# Patient Record
Sex: MALE | Race: WHITE | NOT HISPANIC OR LATINO | Employment: FULL TIME | URBAN - METROPOLITAN AREA
[De-identification: names, ages, dates, MRNs, and addresses within clinical notes are randomized per-mention and may not be internally consistent; named-entity substitution may affect disease eponyms.]

---

## 2020-05-26 ENCOUNTER — HOSPITAL ENCOUNTER (INPATIENT)
Facility: HOSPITAL | Age: 50
LOS: 3 days | Discharge: HOME | DRG: 433 | End: 2020-05-29
Attending: EMERGENCY MEDICINE | Admitting: INTERNAL MEDICINE
Payer: COMMERCIAL

## 2020-05-26 ENCOUNTER — APPOINTMENT (EMERGENCY)
Dept: CT IMAGING | Facility: HOSPITAL | Age: 50
DRG: 433 | End: 2020-05-26
Payer: COMMERCIAL

## 2020-05-26 ENCOUNTER — APPOINTMENT (EMERGENCY)
Dept: RADIOLOGY | Facility: HOSPITAL | Age: 50
DRG: 433 | End: 2020-05-26
Payer: COMMERCIAL

## 2020-05-26 ENCOUNTER — APPOINTMENT (OUTPATIENT)
Dept: INTERVENTIONAL RADIOLOGY/VASCULAR | Facility: HOSPITAL | Age: 50
DRG: 433 | End: 2020-05-26
Payer: COMMERCIAL

## 2020-05-26 DIAGNOSIS — R18.8 CIRRHOSIS OF LIVER WITH ASCITES, UNSPECIFIED HEPATIC CIRRHOSIS TYPE (HCC): ICD-10-CM

## 2020-05-26 DIAGNOSIS — U07.1 COVID-19: ICD-10-CM

## 2020-05-26 DIAGNOSIS — K74.60 CIRRHOSIS (HCC): ICD-10-CM

## 2020-05-26 DIAGNOSIS — R16.1 SPLENOMEGALY: ICD-10-CM

## 2020-05-26 DIAGNOSIS — R18.8 ASCITES: Primary | ICD-10-CM

## 2020-05-26 DIAGNOSIS — K74.60 CIRRHOSIS OF LIVER WITH ASCITES, UNSPECIFIED HEPATIC CIRRHOSIS TYPE (HCC): ICD-10-CM

## 2020-05-26 DIAGNOSIS — R60.0 LOCALIZED EDEMA: ICD-10-CM

## 2020-05-26 DIAGNOSIS — I49.8 BIGEMINY: ICD-10-CM

## 2020-05-26 PROBLEM — F11.20 METHADONE MAINTENANCE THERAPY PATIENT (HCC): Chronic | Status: ACTIVE | Noted: 2020-05-26

## 2020-05-26 PROBLEM — D61.818 PANCYTOPENIA (HCC): Status: ACTIVE | Noted: 2020-05-26

## 2020-05-26 LAB
ALBUMIN FLD-MCNC: <0.6 G/DL
ALBUMIN SERPL BCP-MCNC: 2.6 G/DL (ref 3.5–5)
ALP SERPL-CCNC: 64 U/L (ref 46–116)
ALT SERPL W P-5'-P-CCNC: 30 U/L (ref 12–78)
ANION GAP SERPL CALCULATED.3IONS-SCNC: 4 MMOL/L (ref 4–13)
APPEARANCE FLD: CLEAR
APTT PPP: 38 SECONDS (ref 23–37)
AST SERPL W P-5'-P-CCNC: 46 U/L (ref 5–45)
ATRIAL RATE: 95 BPM
BACTERIA UR QL AUTO: ABNORMAL /HPF
BASOPHILS # BLD AUTO: 0.02 THOUSANDS/ΜL (ref 0–0.1)
BASOPHILS NFR BLD AUTO: 1 % (ref 0–1)
BILIRUB SERPL-MCNC: 2 MG/DL (ref 0.2–1)
BILIRUB UR QL STRIP: NEGATIVE
BUN SERPL-MCNC: 16 MG/DL (ref 5–25)
CALCIUM SERPL-MCNC: 8 MG/DL (ref 8.3–10.1)
CHLORIDE SERPL-SCNC: 103 MMOL/L (ref 100–108)
CLARITY UR: CLEAR
CO2 SERPL-SCNC: 31 MMOL/L (ref 21–32)
COLOR FLD: NORMAL
COLOR UR: ABNORMAL
CREAT SERPL-MCNC: 0.86 MG/DL (ref 0.6–1.3)
EOSINOPHIL # BLD AUTO: 0.12 THOUSAND/ΜL (ref 0–0.61)
EOSINOPHIL NFR BLD AUTO: 3 % (ref 0–6)
ERYTHROCYTE [DISTWIDTH] IN BLOOD BY AUTOMATED COUNT: 16.2 % (ref 11.6–15.1)
GFR SERPL CREATININE-BSD FRML MDRD: 103 ML/MIN/1.73SQ M
GLUCOSE SERPL-MCNC: 141 MG/DL (ref 65–140)
GLUCOSE UR STRIP-MCNC: NEGATIVE MG/DL
HCT VFR BLD AUTO: 36.8 % (ref 36.5–49.3)
HGB BLD-MCNC: 11.8 G/DL (ref 12–17)
HGB UR QL STRIP.AUTO: NEGATIVE
HISTIOCYTES NFR FLD: 12 %
IMM GRANULOCYTES # BLD AUTO: 0.01 THOUSAND/UL (ref 0–0.2)
IMM GRANULOCYTES NFR BLD AUTO: 0 % (ref 0–2)
INR PPP: 1.53 (ref 0.84–1.19)
KETONES UR STRIP-MCNC: NEGATIVE MG/DL
LDH FLD L TO P-CCNC: 52 U/L
LEUKOCYTE ESTERASE UR QL STRIP: NEGATIVE
LIPASE SERPL-CCNC: 188 U/L (ref 73–393)
LYMPHOCYTES # BLD AUTO: 0.87 THOUSANDS/ΜL (ref 0.6–4.47)
LYMPHOCYTES NFR BLD AUTO: 24 % (ref 14–44)
LYMPHOCYTES NFR BLD AUTO: 57 %
MCH RBC QN AUTO: 29.8 PG (ref 26.8–34.3)
MCHC RBC AUTO-ENTMCNC: 32.1 G/DL (ref 31.4–37.4)
MCV RBC AUTO: 93 FL (ref 82–98)
MONO+MESO NFR FLD MANUAL: 3 %
MONOCYTES # BLD AUTO: 0.43 THOUSAND/ΜL (ref 0.17–1.22)
MONOCYTES NFR BLD AUTO: 12 % (ref 4–12)
MONOCYTES NFR BLD AUTO: 22 %
NEUTROPHILS # BLD AUTO: 2.17 THOUSANDS/ΜL (ref 1.85–7.62)
NEUTS SEG NFR BLD AUTO: 6 %
NEUTS SEG NFR BLD AUTO: 60 % (ref 43–75)
NITRITE UR QL STRIP: NEGATIVE
NON-SQ EPI CELLS URNS QL MICRO: ABNORMAL /HPF
NRBC BLD AUTO-RTO: 0 /100 WBCS
PH UR STRIP.AUTO: 7.5 [PH]
PLATELET # BLD AUTO: 56 THOUSANDS/UL (ref 149–390)
PMV BLD AUTO: 10.7 FL (ref 8.9–12.7)
POTASSIUM SERPL-SCNC: 3.6 MMOL/L (ref 3.5–5.3)
PR INTERVAL: 96 MS
PROT FLD-MCNC: <2 G/DL
PROT SERPL-MCNC: 7.1 G/DL (ref 6.4–8.2)
PROT UR STRIP-MCNC: NEGATIVE MG/DL
PROTHROMBIN TIME: 18.1 SECONDS (ref 11.6–14.5)
QRS AXIS: -18 DEGREES
QRSD INTERVAL: 92 MS
QT INTERVAL: 354 MS
QTC INTERVAL: 444 MS
RBC # BLD AUTO: 3.96 MILLION/UL (ref 3.88–5.62)
RBC #/AREA URNS AUTO: ABNORMAL /HPF
SARS-COV-2 RNA RESP QL NAA+PROBE: POSITIVE
SITE: NORMAL
SODIUM SERPL-SCNC: 138 MMOL/L (ref 136–145)
SP GR UR STRIP.AUTO: 1.01 (ref 1–1.03)
T WAVE AXIS: -9 DEGREES
TOTAL CELLS COUNTED SPEC: 100
TROPONIN I SERPL-MCNC: <0.02 NG/ML
TSH SERPL DL<=0.05 MIU/L-ACNC: 4.81 UIU/ML (ref 0.36–3.74)
UROBILINOGEN UR QL STRIP.AUTO: 2 E.U./DL
VENTRICULAR RATE: 95 BPM
WBC # BLD AUTO: 3.62 THOUSAND/UL (ref 4.31–10.16)
WBC # FLD MANUAL: 113 /UL
WBC #/AREA URNS AUTO: ABNORMAL /HPF

## 2020-05-26 PROCEDURE — 80053 COMPREHEN METABOLIC PANEL: CPT | Performed by: PHYSICIAN ASSISTANT

## 2020-05-26 PROCEDURE — 99285 EMERGENCY DEPT VISIT HI MDM: CPT | Performed by: PHYSICIAN ASSISTANT

## 2020-05-26 PROCEDURE — 87070 CULTURE OTHR SPECIMN AEROBIC: CPT | Performed by: EMERGENCY MEDICINE

## 2020-05-26 PROCEDURE — 99223 1ST HOSP IP/OBS HIGH 75: CPT | Performed by: INTERNAL MEDICINE

## 2020-05-26 PROCEDURE — 74177 CT ABD & PELVIS W/CONTRAST: CPT

## 2020-05-26 PROCEDURE — 82042 OTHER SOURCE ALBUMIN QUAN EA: CPT | Performed by: EMERGENCY MEDICINE

## 2020-05-26 PROCEDURE — 83690 ASSAY OF LIPASE: CPT | Performed by: PHYSICIAN ASSISTANT

## 2020-05-26 PROCEDURE — 89051 BODY FLUID CELL COUNT: CPT | Performed by: EMERGENCY MEDICINE

## 2020-05-26 PROCEDURE — 83615 LACTATE (LD) (LDH) ENZYME: CPT | Performed by: EMERGENCY MEDICINE

## 2020-05-26 PROCEDURE — 85730 THROMBOPLASTIN TIME PARTIAL: CPT | Performed by: PHYSICIAN ASSISTANT

## 2020-05-26 PROCEDURE — 84443 ASSAY THYROID STIM HORMONE: CPT | Performed by: PHYSICIAN ASSISTANT

## 2020-05-26 PROCEDURE — 87205 SMEAR GRAM STAIN: CPT | Performed by: EMERGENCY MEDICINE

## 2020-05-26 PROCEDURE — 80074 ACUTE HEPATITIS PANEL: CPT | Performed by: PHYSICIAN ASSISTANT

## 2020-05-26 PROCEDURE — 99285 EMERGENCY DEPT VISIT HI MDM: CPT

## 2020-05-26 PROCEDURE — 93005 ELECTROCARDIOGRAM TRACING: CPT

## 2020-05-26 PROCEDURE — 87635 SARS-COV-2 COVID-19 AMP PRB: CPT | Performed by: PHYSICIAN ASSISTANT

## 2020-05-26 PROCEDURE — 93010 ELECTROCARDIOGRAM REPORT: CPT | Performed by: INTERNAL MEDICINE

## 2020-05-26 PROCEDURE — 71045 X-RAY EXAM CHEST 1 VIEW: CPT

## 2020-05-26 PROCEDURE — 84484 ASSAY OF TROPONIN QUANT: CPT | Performed by: PHYSICIAN ASSISTANT

## 2020-05-26 PROCEDURE — 85025 COMPLETE CBC W/AUTO DIFF WBC: CPT | Performed by: PHYSICIAN ASSISTANT

## 2020-05-26 PROCEDURE — 87040 BLOOD CULTURE FOR BACTERIA: CPT | Performed by: PHYSICIAN ASSISTANT

## 2020-05-26 PROCEDURE — 82570 ASSAY OF URINE CREATININE: CPT | Performed by: INTERNAL MEDICINE

## 2020-05-26 PROCEDURE — 85610 PROTHROMBIN TIME: CPT | Performed by: PHYSICIAN ASSISTANT

## 2020-05-26 PROCEDURE — 36415 COLL VENOUS BLD VENIPUNCTURE: CPT | Performed by: PHYSICIAN ASSISTANT

## 2020-05-26 PROCEDURE — 49083 ABD PARACENTESIS W/IMAGING: CPT

## 2020-05-26 PROCEDURE — 84156 ASSAY OF PROTEIN URINE: CPT | Performed by: INTERNAL MEDICINE

## 2020-05-26 PROCEDURE — 81001 URINALYSIS AUTO W/SCOPE: CPT | Performed by: INTERNAL MEDICINE

## 2020-05-26 PROCEDURE — 84157 ASSAY OF PROTEIN OTHER: CPT | Performed by: EMERGENCY MEDICINE

## 2020-05-26 RX ORDER — FUROSEMIDE 40 MG/1
40 TABLET ORAL DAILY
Status: DISCONTINUED | OUTPATIENT
Start: 2020-05-27 | End: 2020-05-28

## 2020-05-26 RX ORDER — SPIRONOLACTONE 25 MG/1
100 TABLET ORAL DAILY
Status: DISCONTINUED | OUTPATIENT
Start: 2020-05-27 | End: 2020-05-29 | Stop reason: HOSPADM

## 2020-05-26 RX ORDER — METHADONE HYDROCHLORIDE 10 MG/1
100 TABLET ORAL DAILY
Status: DISCONTINUED | OUTPATIENT
Start: 2020-05-27 | End: 2020-05-29 | Stop reason: HOSPADM

## 2020-05-26 RX ORDER — ACETAMINOPHEN 325 MG/1
650 TABLET ORAL EVERY 8 HOURS PRN
Status: DISCONTINUED | OUTPATIENT
Start: 2020-05-26 | End: 2020-05-29 | Stop reason: HOSPADM

## 2020-05-26 RX ADMIN — IOHEXOL 100 ML: 350 INJECTION, SOLUTION INTRAVENOUS at 14:37

## 2020-05-26 RX ADMIN — ACETAMINOPHEN 650 MG: 325 TABLET ORAL at 20:20

## 2020-05-26 NOTE — ED PROVIDER NOTES
History  Chief Complaint   Patient presents with   [place]     pt reports bloating that he noticed about a month ago  states that it is now in his legs and going down into his scrotum  states he "never goes to the doctor"      Patient is a 53 y/o M with h/o IV drug abuse, currently on methadone presents to the ED with abdominal bloating, swelling to groin and legs for 2 months that has been worsening  Patient states he never goes to a doctor  He denies history of hepatitis  He denies abdominal pain  He states his father was diagnosed with covid about 1 month ago and he had similar symptoms so he was told he most likely has it  He has SOB, no cough  No chest pain  History provided by:  Patient  Medical Problem   Location:  Abdominal bloating, scrotal and leg edema  Severity:  Moderate  Onset quality:  Gradual  Duration:  2 months  Timing:  Constant  Progression:  Worsening  Chronicity:  New  Context:  Patient presents to the ED with abdominal bloating and swelling of scrotum and legs for 2 months  PMH unremarkable  Relieved by:  Nothing  Worsened by:  Nothing  Ineffective treatments:  None tried  Associated symptoms: shortness of breath    Associated symptoms: no abdominal pain, no chest pain, no congestion, no cough, no diarrhea, no fatigue, no fever, no headaches, no loss of consciousness, no nausea, no rash, no rhinorrhea and no vomiting        None       History reviewed  No pertinent past medical history  Past Surgical History:   Procedure Laterality Date    IR PARACENTESIS  5/26/2020       History reviewed  No pertinent family history  I have reviewed and agree with the history as documented      E-Cigarette/Vaping     E-Cigarette/Vaping Substances     Social History     Tobacco Use    Smoking status: Never Smoker    Smokeless tobacco: Never Used   Substance Use Topics    Alcohol use: Not Currently    Drug use: Not Currently     Comment: methadone for 20 years        Review of Systems Constitutional: Negative for activity change, appetite change, chills, fatigue and fever  HENT: Negative for congestion and rhinorrhea  Respiratory: Positive for shortness of breath  Negative for cough  Cardiovascular: Positive for leg swelling  Negative for chest pain  Gastrointestinal: Negative for abdominal pain, diarrhea, nausea and vomiting  Musculoskeletal: Negative for back pain  Skin: Negative for color change and rash  Neurological: Negative for dizziness, loss of consciousness, weakness and headaches  Psychiatric/Behavioral: Negative for confusion  All other systems reviewed and are negative  Physical Exam  Physical Exam   Constitutional: He is oriented to person, place, and time  He appears well-developed and well-nourished  He is cooperative  He does not appear ill  No distress  HENT:   Head: Normocephalic and atraumatic  Eyes: Conjunctivae are normal    Neck: Normal range of motion  Cardiovascular: An irregular rhythm present  Bradycardia present  3+ pitting edema b/l LE  Pulmonary/Chest: Effort normal and breath sounds normal    Abdominal: Bowel sounds are normal  He exhibits ascites  There is no tenderness  Obese abdomen   Musculoskeletal: He exhibits edema  Neurological: He is alert and oriented to person, place, and time  He has normal strength  Skin: Skin is warm and dry  No rash noted  He is not diaphoretic  No pallor  Nursing note and vitals reviewed        Vital Signs  ED Triage Vitals   Temperature Pulse Respirations Blood Pressure SpO2   05/26/20 1239 05/26/20 1209 05/26/20 1209 05/26/20 1209 05/26/20 1209   98 8 °F (37 1 °C) (!) 53 18 109/81 99 %      Temp Source Heart Rate Source Patient Position - Orthostatic VS BP Location FiO2 (%)   05/26/20 1239 05/26/20 1209 05/26/20 1209 05/26/20 1209 --   Oral Monitor Lying Right arm       Pain Score       --                  Vitals:    05/26/20 1209 05/26/20 1400 05/26/20 1645 05/26/20 1700   BP: 109/81 137/90 154/70 151/71   Pulse: (!) 53 80 84 99   Patient Position - Orthostatic VS: Lying Lying           Visual Acuity      ED Medications  Medications   iohexol (OMNIPAQUE) 350 MG/ML injection (MULTI-DOSE) 100 mL (100 mL Intravenous Given 5/26/20 1437)       Diagnostic Studies  Results Reviewed     Procedure Component Value Units Date/Time    Albumin, fluid [[id number]] Collected:  05/26/20 1711    Lab Status: In process Specimen: Body Fluid from Paracentesis Updated:  05/26/20 1729    Total Protein, Fluid [[id number]] Collected:  05/26/20 1711    Lab Status: In process Specimen: Body Fluid from Paracentesis Updated:  05/26/20 1729    LD (LDH), Body Fluid [[id number]] Collected:  05/26/20 1711    Lab Status: In process Specimen: Body Fluid from Paracentesis Updated:  05/26/20 1729    Body fluid culture and Gram stain [[id number]] Collected:  05/26/20 1711    Lab Status: In process Specimen: Body Fluid from Paracentesis Updated:  05/26/20 1729    Body fluid white cell count with differential [[id number]] Collected:  05/26/20 1711    Lab Status: In process Specimen: Body Fluid from Paracentesis Updated:  05/26/20 1728    Hepatitis panel, acute [[id number]] Collected:  05/26/20 1539    Lab Status: In process Specimen:  Blood from Arm, Left Updated:  05/26/20 1542    Novel Coronavirus (Covid-19),PCR [facility] [[id number]]  (Abnormal) Collected:  05/26/20 1248    Lab Status:  Final result Specimen:  Nares from Nose Updated:  05/26/20 1350     SARS-CoV-2 Positive    Narrative: The specimen collection materials, transport medium, and/or testing methodology utilized in the production of these test results have been proven to be reliable in a limited validation with an abbreviated program under the Emergency Utilization Authorization provided by the FDA  Testing reported as "Presumptive positive" will be confirmed with secondary testing with a reference laboratory to ensure result accuracy    Clinical caution and judgement should be used with the interpretation of these results with consideration of the clinical impression and other laboratory testing  Testing reported as "Positive" or "Negative" has been proven to be accurate according to standard laboratory validation requirements  All testing is performed with control materials showing appropriate reactivity at standard intervals  Protime-INR [[id number]]  (Abnormal) Collected:  05/26/20 1222    Lab Status:  Final result Specimen:  Blood from Arm, Left Updated:  05/26/20 1309     Protime 18 1 seconds      INR 1 53    APTT [[id number]]  (Abnormal) Collected:  05/26/20 1222    Lab Status:  Final result Specimen:  Blood from Arm, Left Updated:  05/26/20 1309     PTT 38 seconds     CBC and differential [[id number]]  (Abnormal) Collected:  05/26/20 1222    Lab Status:  Final result Specimen:  Blood from Arm, Left Updated:  05/26/20 1305     WBC 3 62 Thousand/uL      RBC 3 96 Million/uL      Hemoglobin 11 8 g/dL      Hematocrit 36 8 %      MCV 93 fL      MCH 29 8 pg      MCHC 32 1 g/dL      RDW 16 2 %      MPV 10 7 fL      Platelets 56 Thousands/uL      nRBC 0 /100 WBCs      Neutrophils Relative 60 %      Immat GRANS % 0 %      Lymphocytes Relative 24 %      Monocytes Relative 12 %      Eosinophils Relative 3 %      Basophils Relative 1 %      Neutrophils Absolute 2 17 Thousands/µL      Immature Grans Absolute 0 01 Thousand/uL      Lymphocytes Absolute 0 87 Thousands/µL      Monocytes Absolute 0 43 Thousand/µL      Eosinophils Absolute 0 12 Thousand/µL      Basophils Absolute 0 02 Thousands/µL     TSH [[id number]]  (Abnormal) Collected:  05/26/20 1222    Lab Status:  Final result Specimen:  Blood from Arm, Left Updated:  05/26/20 1301     TSH 3RD GENERATON 4 812 uIU/mL     Narrative:       Patients undergoing fluorescein dye angiography may retain small amounts of fluorescein in the body for 48-72 hours post procedure   Samples containing fluorescein can produce falsely depressed TSH values  If the patient had this procedure,a specimen should be resubmitted post fluorescein clearance  Comprehensive metabolic panel [[id number]]  (Abnormal) Collected:  05/26/20 1222    Lab Status:  Final result Specimen:  Blood from Arm, Left Updated:  05/26/20 1257     Sodium 138 mmol/L      Potassium 3 6 mmol/L      Chloride 103 mmol/L      CO2 31 mmol/L      ANION GAP 4 mmol/L      BUN 16 mg/dL      Creatinine 0 86 mg/dL      Glucose 141 mg/dL      Calcium 8 0 mg/dL      AST 46 U/L      ALT 30 U/L      Alkaline Phosphatase 64 U/L      Total Protein 7 1 g/dL      Albumin 2 6 g/dL      Total Bilirubin 2 00 mg/dL      eGFR 103 ml/min/1 73sq m     Narrative:       [place] guidelines for Chronic Kidney Disease (CKD):     Stage 1 with normal or high GFR (GFR > 90 mL/min/1 73 square meters)    Stage 2 Mild CKD (GFR = 60-89 mL/min/1 73 square meters)    Stage 3A Moderate CKD (GFR = 45-59 mL/min/1 73 square meters)    Stage 3B Moderate CKD (GFR = 30-44 mL/min/1 73 square meters)    Stage 4 Severe CKD (GFR = 15-29 mL/min/1 73 square meters)    Stage 5 End Stage CKD (GFR <15 mL/min/1 73 square meters)  Note: GFR calculation is accurate only with a steady state creatinine    Lipase [[id number]]  (Normal) Collected:  05/26/20 1222    Lab Status:  Final result Specimen:  Blood from Arm, Left Updated:  05/26/20 1257     Lipase 188 u/L     Troponin I [[id number]]  (Normal) Collected:  05/26/20 1222    Lab Status:  Final result Specimen:  Blood from Arm, Left Updated:  05/26/20 1256     Troponin I <0 02 ng/mL     Blood culture #1 [[id number]] Collected:  05/26/20 1222    Lab Status: In process Specimen:  Blood from Arm, Left Updated:  05/26/20 1235    Blood culture #2 [[id number]] Collected:  05/26/20 1223    Lab Status:   In process Specimen:  Blood from Arm, Right Updated:  05/26/20 1233    POCT urinalysis dipstick [[id number]]     Lab Status:  No result Specimen:  Urine IR paracentesis   Final Result by Cathy Tripathi MD (05/26 4990)   Technically successful image-guided paracentesis as described  Workstation performed: [id number]         CT abdomen pelvis with contrast   Final Result by Franki Swan MD (05/26 6347)      Cirrhosis with evidence of portal hypertension and massive splenomegaly  Large volume of abdominopelvic ascites  Fluid extends into both inguinal canals and into the scrotum  Diffuse body wall anasarca  Trace left pleural effusion  Workstation performed: [id number]         XR chest 1 view portable   Final Result by Arielle Dimas MD (05/26 5623)      No acute cardiopulmonary disease  Workstation performed: [id number]                    Procedures  ECG 12 Lead Documentation Only  Date/Time: 5/26/2020 12:42 PM  Performed by: Maxime Bertrand PA-C  Authorized by: Maxime Bertrand PA-C     Indications / Diagnosis:  SOB  ECG reviewed by me, the ED Provider: yes    Patient location:  ED  Previous ECG:     Previous ECG:  Unavailable  Rate:     ECG rate:  95  Rhythm:     Rhythm: sinus rhythm    Ectopy:     Ectopy: bigeminy    ST segments:     ST segments:  Normal  Comments:      Low voltage  QTc 444             ED Course  ED Course as of May 26 1749   Tue May 26, 2020   1515 SLIM paged for admission to hospital        1529 Dr Lucina Mike would like IR contacted  Paged IR                                            MDM  Number of Diagnoses or Management Options  Ascites: new and requires workup  Bigeminy: new and requires workup  Cirrhosis ([facility] [address] ): new and requires workup  COVID-19: new and requires workup  Splenomegaly: new and requires workup  Diagnosis management comments: Patient with new onset ascites, will order labs, CT scan to r/o cirrhosis, hypothyroid  Patient with large amount of ascites, IR in the ER to perform paracentesis  Patient covid positive, on isolation precautions    WIll admit for observation after paracentesis  Amount and/or Complexity of Data Reviewed  Clinical lab tests: ordered and reviewed  Tests in the radiology section of CPT®: ordered and reviewed  Discuss the patient with other providers: yes    Patient Progress  Patient progress: stable        Disposition  Final diagnoses:   Ascites   COVID-19   Splenomegaly   Bigeminy   Cirrhosis ([facility] [place] [address] )     Time reflects when diagnosis was documented in both MDM as applicable and the Disposition within this note     Time User Action Codes Description Comment    5/26/2020  3:15 PM Tamra Grate [R18 8] Ascites     5/26/2020  3:15 PM Lance Chance Add [U07 1] COVID-19     5/26/2020  3:16 PM Lance Chance Add [R16 1] Splenomegaly     5/26/2020  3:16 PM Lance [place] Add [I49 9] Bigeminy     5/26/2020  3:16 PM Lance Chance Add [K74 60] Cirrhosis [facility])       ED Disposition     ED Disposition Condition Date/Time Comment    Admit Stable Tue May 26, 2020  4:01 PM Case was discussed with Dr Barbara Rosen and the patient's admission status was agreed to be Admission Status: observation status to the service of Dr Barbara Rosen   Follow-up Information    None         There are no discharge medications for this patient  No discharge procedures on file      PDMP Review     None          ED Provider  Electronically Signed by           Kwadwo Carpenter PA-C  05/26/20 6180

## 2020-05-26 NOTE — ASSESSMENT & PLAN NOTE
Patient denies previous history disease reports markedly worsening edema the last 1 to 2 weeks  Patient thinks he had COVID about a month ago but his COVID-19 infection had resolved  Patient denies any active alcohol use, has not had any significant alcohol intake in more than 20 years  Does have history of opiate abuse has been on methadone for many years    Patient has no chronic pain, denies any acute pain  Check hepatitis panel, GI consult requested, hemochromatosis testing ordered  Patient may need liver biopsy, follow-up ascitic fluid testing    Check echocardiogram    Check lower extremity Dopplers, liver ultrasound with Dopplers ordered

## 2020-05-26 NOTE — H&P
H&P- Juanito Banda 1970, 54 y o  male MRN: [id number]    Unit/Bed#: -01 Encounter: [id number]    Primary Care Provider: No primary care provider on file  Date and time admitted to hospital: 5/26/2020 12:08 PM        * Cirrhosis of liver with ascites [facility])  Assessment & Plan  Patient denies previous history disease reports markedly worsening edema the last 1 to 2 weeks  Patient thinks he had COVID about a month ago but his COVID-19 infection had resolved  Patient denies any active alcohol use, has not had any significant alcohol intake in more than 20 years  Start diuretics Lasix and Aldactone, fluid restricted diet and low-sodium diet    Patient has no chronic pain, denies any acute pain  Check hepatitis panel, GI consult requested, hemochromatosis testing ordered  Patient may need liver biopsy, follow-up ascitic fluid testing    Check echocardiogram    Check lower extremity Dopplers, liver ultrasound with Dopplers ordered  COVID-19  Assessment & Plan  I do not think that the patient has an active COVID-19 infection, likely has residual positive testing from recent infection  Maintain isolation, will review with Infectious Disease  Methadone maintenance therapy patient [facility])  Assessment & Plan  Continue methadone        VTE Prophylaxis: Enoxaparin (Lovenox)  / reason for no mechanical VTE prophylaxis Not indicated   Code Status:  Full code  POLST: There is no POLST form on file for this patient (pre-hospital)      Anticipated Length of Stay:  Patient will be admitted on an Inpatient basis with an anticipated length of stay of  greater than 2 midnights  Justification for Hospital Stay:  Patient with new onset of severe anasarca, ascites, suspected cirrhosis  Total Time for Visit, including Counseling / Coordination of Care: 90 minutes  Greater than 50% of this total time spent on direct patient counseling and coordination of care      Chief Complaint:   Abdominal bloating    History of Present Illness:    Elijah Mondragon is a 54 y o  male who presents with increasing abdominal bloating, patient reports increased swelling, of his lower extremities, scrotum and abdomen for about 2 weeks  Patient denies any history of liver disease, he does not drink alcohol, he has multiple tattoos, has a history of IV narcotic use, he is on chronic methadone therapy due to his history of narcotic abuse, he has not used any illegal drugs in many years  Last reported alcohol intake was over 20 years ago  No fever no chills, no acute pain, specifically no abdominal pain, he just had significant bloating to the point where it was making it hard to breathe  Patient had paracentesis performed in the emergency room  Review of Systems:    Review of Systems   All other systems reviewed and are negative  Past Medical and Surgical History:     History reviewed  No pertinent past medical history  Past Surgical History:   Procedure Laterality Date    IR PARACENTESIS  5/26/2020       Meds/Allergies:    Prior to Admission medications    Not on File     I have reviewed home medications with patient personally  Allergies: No Known Allergies    Social History:     Marital Status: Single   Substance Use History:   Social History     Substance and Sexual Activity   Alcohol Use Not Currently     Social History     Tobacco Use   Smoking Status Never Smoker   Smokeless Tobacco Never Used     Social History     Substance and Sexual Activity   Drug Use Not Currently    Comment: methadone for 20 years        Family History:    non-contributory    Physical Exam:     Vitals:   Blood Pressure: 117/72 (05/26/20 1749)  Pulse: 81 (05/26/20 1749)  Temperature: 98 4 °F (36 9 °C) (05/26/20 1749)  Temp Source: Oral (05/26/20 1239)  Respirations: 16 (05/26/20 1749)  SpO2: 99 % (05/26/20 1749)    Physical Exam   Constitutional: He is oriented to person, place, and time     Patient is a cachectic male, he appears chronically ill for stated age of 54years old   HENT:   Head: Normocephalic and atraumatic  Right Ear: External ear normal    Left Ear: External ear normal    Eyes: Right eye exhibits no discharge  Left eye exhibits no discharge  Cardiovascular: Normal rate, regular rhythm, normal heart sounds and intact distal pulses  Pulmonary/Chest: Effort normal    Abdominal: Soft  Bowel sounds are normal  He exhibits no distension  There is no tenderness  Musculoskeletal: He exhibits edema (Patient with severe pitting edema to the hips bilaterally, edema also involves his flanks bilaterally)  Patient with significant muscle wasting diffusely, appears chronically ill   Neurological: He is alert and oriented to person, place, and time  He displays normal reflexes  No cranial nerve deficit or sensory deficit  He exhibits normal muscle tone  Coordination normal    Skin: Skin is warm and dry  No rash noted  No erythema  No pallor  Nursing note and vitals reviewed  Additional Data:     Lab Results: I have personally reviewed pertinent reports  Results from last 7 days   Lab Units 05/26/20  1222   WBC Thousand/uL 3 62*   HEMOGLOBIN g/dL 11 8*   HEMATOCRIT % 36 8   PLATELETS Thousands/uL 56*   NEUTROS PCT % 60   LYMPHS PCT % 24   MONOS PCT % 12   EOS PCT % 3     Results from last 7 days   Lab Units 05/26/20  1222   SODIUM mmol/L 138   POTASSIUM mmol/L 3 6   CHLORIDE mmol/L 103   CO2 mmol/L 31   BUN mg/dL 16   CREATININE mg/dL 0 86   ANION GAP mmol/L 4   CALCIUM mg/dL 8 0*   ALBUMIN g/dL 2 6*   TOTAL BILIRUBIN mg/dL 2 00*   ALK PHOS U/L 64   ALT U/L 30   AST U/L 46*   GLUCOSE RANDOM mg/dL 141*     Results from last 7 days   Lab Units 05/26/20  1222   INR  1 53*                   Imaging: I have personally reviewed pertinent reports  IR paracentesis   Final Result by Wilder Shine MD (05/26 1730)   Technically successful image-guided paracentesis as described              Workstation performed: [id number]         CT abdomen pelvis with contrast   Final Result by Carol Orr MD (05/26 7)      Cirrhosis with evidence of portal hypertension and massive splenomegaly  Large volume of abdominopelvic ascites  Fluid extends into both inguinal canals and into the scrotum  Diffuse body wall anasarca  Trace left pleural effusion  Workstation performed: [id number]         XR chest 1 view portable   Final Result by Carine Andino MD (05/26 5077)      No acute cardiopulmonary disease  Workstation performed: [id number]         VAS lower limb venous duplex study, complete bilateral    (Results Pending)   US right upper quadrant with liver dopplers    (Results Pending)       Allscripts / Epic Records Reviewed: No there are no available records in the system  There are no available records in Care everywhere    ** Please Note: This note has been constructed using a voice recognition system   **

## 2020-05-26 NOTE — ASSESSMENT & PLAN NOTE
I do not think that the patient has an active COVID-19 infection, likely has residual positive testing from recent infection  Maintain isolation, will review with Infectious Disease

## 2020-05-27 ENCOUNTER — APPOINTMENT (INPATIENT)
Dept: NON INVASIVE DIAGNOSTICS | Facility: HOSPITAL | Age: 50
DRG: 433 | End: 2020-05-27
Payer: COMMERCIAL

## 2020-05-27 LAB
AFP-TM SERPL-MCNC: 1.9 NG/ML (ref 0.5–8)
ALBUMIN SERPL BCP-MCNC: 2 G/DL (ref 3.5–5)
ALP SERPL-CCNC: 48 U/L (ref 46–116)
ALT SERPL W P-5'-P-CCNC: 23 U/L (ref 12–78)
ANION GAP SERPL CALCULATED.3IONS-SCNC: 2 MMOL/L (ref 4–13)
AST SERPL W P-5'-P-CCNC: 43 U/L (ref 5–45)
BASOPHILS # BLD AUTO: 0.01 THOUSANDS/ΜL (ref 0–0.1)
BASOPHILS NFR BLD AUTO: 1 % (ref 0–1)
BILIRUB SERPL-MCNC: 1.6 MG/DL (ref 0.2–1)
BUN SERPL-MCNC: 13 MG/DL (ref 5–25)
CALCIUM SERPL-MCNC: 7.4 MG/DL (ref 8.3–10.1)
CHLORIDE SERPL-SCNC: 104 MMOL/L (ref 100–108)
CO2 SERPL-SCNC: 32 MMOL/L (ref 21–32)
CREAT SERPL-MCNC: 0.66 MG/DL (ref 0.6–1.3)
CREAT UR-MCNC: 23.6 MG/DL
EOSINOPHIL # BLD AUTO: 0.06 THOUSAND/ΜL (ref 0–0.61)
EOSINOPHIL NFR BLD AUTO: 4 % (ref 0–6)
ERYTHROCYTE [DISTWIDTH] IN BLOOD BY AUTOMATED COUNT: 16 % (ref 11.6–15.1)
FERRITIN SERPL-MCNC: 161 NG/ML (ref 8–388)
GFR SERPL CREATININE-BSD FRML MDRD: 115 ML/MIN/1.73SQ M
GLUCOSE SERPL-MCNC: 74 MG/DL (ref 65–140)
HAV IGM SER QL: ABNORMAL
HBV CORE IGM SER QL: ABNORMAL
HBV SURFACE AG SER QL: ABNORMAL
HCT VFR BLD AUTO: 34 % (ref 36.5–49.3)
HCV AB SER QL: ABNORMAL
HGB BLD-MCNC: 11.2 G/DL (ref 12–17)
IMM GRANULOCYTES # BLD AUTO: 0 THOUSAND/UL (ref 0–0.2)
IMM GRANULOCYTES NFR BLD AUTO: 0 % (ref 0–2)
INR PPP: 1.66 (ref 0.84–1.19)
IRON SATN MFR SERPL: 26 %
IRON SERPL-MCNC: 48 UG/DL (ref 65–175)
LYMPHOCYTES # BLD AUTO: 0.38 THOUSANDS/ΜL (ref 0.6–4.47)
LYMPHOCYTES NFR BLD AUTO: 24 % (ref 14–44)
MAGNESIUM SERPL-MCNC: 1.8 MG/DL (ref 1.6–2.6)
MCH RBC QN AUTO: 30.2 PG (ref 26.8–34.3)
MCHC RBC AUTO-ENTMCNC: 32.9 G/DL (ref 31.4–37.4)
MCV RBC AUTO: 92 FL (ref 82–98)
MONOCYTES # BLD AUTO: 0.19 THOUSAND/ΜL (ref 0.17–1.22)
MONOCYTES NFR BLD AUTO: 12 % (ref 4–12)
NEUTROPHILS # BLD AUTO: 0.93 THOUSANDS/ΜL (ref 1.85–7.62)
NEUTS SEG NFR BLD AUTO: 59 % (ref 43–75)
NRBC BLD AUTO-RTO: 0 /100 WBCS
NT-PROBNP SERPL-MCNC: 866 PG/ML
PHOSPHATE SERPL-MCNC: 3 MG/DL (ref 2.7–4.5)
PLATELET # BLD AUTO: 30 THOUSANDS/UL (ref 149–390)
PMV BLD AUTO: 10.8 FL (ref 8.9–12.7)
POTASSIUM SERPL-SCNC: 3.9 MMOL/L (ref 3.5–5.3)
PROT SERPL-MCNC: 5.7 G/DL (ref 6.4–8.2)
PROT UR-MCNC: 8 MG/DL
PROT/CREAT UR: 0.34 MG/G{CREAT} (ref 0–0.1)
PROTHROMBIN TIME: 19.3 SECONDS (ref 11.6–14.5)
RBC # BLD AUTO: 3.71 MILLION/UL (ref 3.88–5.62)
SODIUM SERPL-SCNC: 138 MMOL/L (ref 136–145)
TIBC SERPL-MCNC: 187 UG/DL (ref 250–450)
WBC # BLD AUTO: 1.57 THOUSAND/UL (ref 4.31–10.16)

## 2020-05-27 PROCEDURE — 82784 ASSAY IGA/IGD/IGG/IGM EACH: CPT | Performed by: INTERNAL MEDICINE

## 2020-05-27 PROCEDURE — 99233 SBSQ HOSP IP/OBS HIGH 50: CPT | Performed by: INTERNAL MEDICINE

## 2020-05-27 PROCEDURE — 81256 HFE GENE: CPT | Performed by: INTERNAL MEDICINE

## 2020-05-27 PROCEDURE — 83516 IMMUNOASSAY NONANTIBODY: CPT | Performed by: INTERNAL MEDICINE

## 2020-05-27 PROCEDURE — 93970 EXTREMITY STUDY: CPT

## 2020-05-27 PROCEDURE — 84100 ASSAY OF PHOSPHORUS: CPT | Performed by: INTERNAL MEDICINE

## 2020-05-27 PROCEDURE — 99254 IP/OBS CNSLTJ NEW/EST MOD 60: CPT | Performed by: INTERNAL MEDICINE

## 2020-05-27 PROCEDURE — 82728 ASSAY OF FERRITIN: CPT | Performed by: INTERNAL MEDICINE

## 2020-05-27 PROCEDURE — 82390 ASSAY OF CERULOPLASMIN: CPT | Performed by: INTERNAL MEDICINE

## 2020-05-27 PROCEDURE — 82105 ALPHA-FETOPROTEIN SERUM: CPT | Performed by: INTERNAL MEDICINE

## 2020-05-27 PROCEDURE — 83540 ASSAY OF IRON: CPT | Performed by: INTERNAL MEDICINE

## 2020-05-27 PROCEDURE — 85610 PROTHROMBIN TIME: CPT | Performed by: INTERNAL MEDICINE

## 2020-05-27 PROCEDURE — 85025 COMPLETE CBC W/AUTO DIFF WBC: CPT | Performed by: INTERNAL MEDICINE

## 2020-05-27 PROCEDURE — 86255 FLUORESCENT ANTIBODY SCREEN: CPT | Performed by: INTERNAL MEDICINE

## 2020-05-27 PROCEDURE — 83880 ASSAY OF NATRIURETIC PEPTIDE: CPT | Performed by: INTERNAL MEDICINE

## 2020-05-27 PROCEDURE — 93970 EXTREMITY STUDY: CPT | Performed by: SURGERY

## 2020-05-27 PROCEDURE — 83550 IRON BINDING TEST: CPT | Performed by: INTERNAL MEDICINE

## 2020-05-27 PROCEDURE — 83735 ASSAY OF MAGNESIUM: CPT | Performed by: INTERNAL MEDICINE

## 2020-05-27 PROCEDURE — 80053 COMPREHEN METABOLIC PANEL: CPT | Performed by: INTERNAL MEDICINE

## 2020-05-27 RX ADMIN — METHADONE HYDROCHLORIDE 100 MG: 10 TABLET ORAL at 09:04

## 2020-05-27 RX ADMIN — SPIRONOLACTONE 100 MG: 25 TABLET ORAL at 09:02

## 2020-05-27 RX ADMIN — FUROSEMIDE 40 MG: 40 TABLET ORAL at 09:03

## 2020-05-27 NOTE — PROGRESS NOTES
Progress Note - Sana Hairston 1970, 54 y o  male MRN: [id number]    Unit/Bed#: -01 Encounter: [id number]    Primary Care Provider: No primary care provider on file  Date and time admitted to hospital: 5/26/2020 12:08 PM        * Cirrhosis of liver with ascites [facility])  Assessment & Plan  Patient denies previous history of liver disease reports markedly worsening edema the last 1 to 2 weeks  Patient thinks he had COVID about a month ago but his COVID-19 infection had resolved  Patient denies any active alcohol use, has not had any significant alcohol intake in more than 20 years  Does have history of opiate abuse has been on methadone for many years    Patient has no chronic pain, denies any acute pain  Workup for etiology of cirrhosis pending, patient started on Lasix and Aldactone, continue the same, monitor urine output and ascites  Case discussed with Gastroenterology    COVID-19  Assessment & Plan  I do not think that the patient has an active COVID-19 infection, likely has residual positive testing from recent infection  Maintain isolation  Pancytopenia ([facility] [address] )  Assessment & Plan  Pancytopenia, present on admission, likely secondary to underlying cirrhosis  Methadone maintenance therapy patient [facility])  Assessment & Plan  Continue methadone        VTE Pharmacologic Prophylaxis:   Pharmacologic: Pharmacologic VTE Prophylaxis contraindicated due to Thrombocytopenia  Mechanical VTE Prophylaxis in Place: No severe edema, encourage ambulation  Patient Centered Rounds: I have performed bedside rounds with nursing staff today  Discussions with Specialists or Other Care Team Provider:  Case discussed with GI    Education and Discussions with Family / Patient:  Patient updated with plan of care    Time Spent for Care: 45 minutes  More than 50% of total time spent on counseling and coordination of care as described above      Current Length of Stay: 1 day(s)    Current Patient Status: Inpatient   Certification Statement: The patient will continue to require additional inpatient hospital stay due to Will monitor anasarca/edema/ascites with oral diuretics  Discharge Plan / Estimated Discharge Date:  Tentative discharge plan for 2020      Code Status: Level 1 - Full Code      Subjective:   No pain, patient does have some increased abdominal fullness overnight  Objective:     Vitals:   Temp (24hrs), Av 1 °F (36 7 °C), Min:97 9 °F (36 6 °C), Max:98 4 °F (36 9 °C)    Temp:  [97 9 °F (36 6 °C)-98 4 °F (36 9 °C)] 97 9 °F (36 6 °C)  HR:  [59-99] 59  Resp:  [16-20] 18  BP: (111-154)/(68-72) 113/68  SpO2:  [95 %-100 %] 97 %  There is no height or weight on file to calculate BMI  Input and Output Summary (last 24 hours): Intake/Output Summary (Last 24 hours) at 2020 1456  Last data filed at 2020 1713  Gross per 24 hour   Intake    Output 7900 ml   Net -7900 ml       Physical Exam:     Physical Exam   Constitutional: He is oriented to person, place, and time  He appears well-developed and well-nourished  No distress  HENT:   Head: Normocephalic and atraumatic  Right Ear: External ear normal    Left Ear: External ear normal    Pulmonary/Chest: Effort normal and breath sounds normal  No stridor  No respiratory distress  Abdominal: Soft  Bowel sounds are normal  He exhibits distension  He exhibits no mass  There is no tenderness  There is no guarding  Neurological: He is alert and oriented to person, place, and time  No cranial nerve deficit  Coordination normal    Skin: He is not diaphoretic  Nursing note and vitals reviewed        Additional Data:     Labs:    Results from last 7 days   Lab Units 20  0637   WBC Thousand/uL 1 57*   HEMOGLOBIN g/dL 11 2*   HEMATOCRIT % 34 0*   PLATELETS Thousands/uL 30*   NEUTROS PCT % 59   LYMPHS PCT % 24   MONOS PCT % 12   EOS PCT % 4     Results from last 7 days   Lab Units 20  0637   POTASSIUM mmol/L 3 9 CHLORIDE mmol/L 104   CO2 mmol/L 32   BUN mg/dL 13   CREATININE mg/dL 0 66   CALCIUM mg/dL 7 4*   ALK PHOS U/L 48   ALT U/L 23   AST U/L 43     Results from last 7 days   Lab Units 05/27/20  0637   INR  1 66*       * I Have Reviewed All Lab Data Listed Above  * Additional Pertinent Lab Tests Reviewed: [facility]        Recent Cultures (last 7 days):     Results from last 7 days   Lab Units 05/26/20  1711 05/26/20  1223 05/26/20  1222   BLOOD CULTURE   --  Received in Microbiology Lab  Culture in Progress  Received in Microbiology Lab  Culture in Progress     GRAM STAIN RESULT  1+ Polys  No organisms seen  --   --    BODY FLUID CULTURE, STERILE  No growth  --   --        Last 24 Hours Medication List:     Current Facility-Administered Medications:  acetaminophen 650 mg Oral Q8H PRN Mihir Faulkner DO   furosemide 40 mg Oral Daily Mihir Faulkner DO   methadone 100 mg Oral Daily Mihir Faulkner DO   spironolactone 100 mg Oral Daily Mihir Faulkner DO        Today, Patient Was Seen By: Mihir Faulkner DO

## 2020-05-27 NOTE — ASSESSMENT & PLAN NOTE
I do not think that the patient has an active COVID-19 infection, likely has residual positive testing from recent infection  Maintain isolation

## 2020-05-27 NOTE — ASSESSMENT & PLAN NOTE
Patient denies previous history of liver disease reports markedly worsening edema the last 1 to 2 weeks  Patient thinks he had COVID about a month ago but his COVID-19 infection had resolved  Patient denies any active alcohol use, has not had any significant alcohol intake in more than 20 years  Does have history of opiate abuse has been on methadone for many years    Patient has no chronic pain, denies any acute pain  Workup for etiology of cirrhosis pending, patient started on Lasix and Aldactone, continue the same, monitor urine output and ascites      Case discussed with Gastroenterology

## 2020-05-27 NOTE — UTILIZATION REVIEW
Initial Clinical Review    Admission: Date/Time/Statement: Admission Orders (From admission, onward)     Ordered        05/26/20 1819  Inpatient Admission  Once                   Orders Placed This Encounter   Procedures    Inpatient Admission     Standing Status:   Standing     Number of Occurrences:   1     Order Specific Question:   Admitting Physician     Answer:   Asaf Florian     Order Specific Question:   Level of Care     Answer:   Med Surg [16]     Order Specific Question:   Estimated length of stay     Answer:   More than 2 Midnights     Order Specific Question:   Certification     Answer:   I certify that inpatient services are medically necessary for this patient for a duration of greater than two midnights  See H&P and MD Progress Notes for additional information about the patient's course of treatment  ED Arrival Information     Expected Arrival Acuity Means of Arrival Escorted By Service Admission Type    - 5/26/2020 12:01 Urgent Walk-In Self General Medicine Urgent    Arrival Complaint    SOB, cough, abd bloating        Chief Complaint   Patient presents with   Cathern Hearing     pt reports bloating that he noticed about a month ago  states that it is now in his legs and going down into his scrotum  states he "never goes to the doctor"      Assessment/Plan:  54 y o  male to ED from home  no significant pmh , presents with increasing abdominal bloating, patient reports increased swelling, of his lower extremities, scrotum and abdomen for about 2 weeks  Patient denies any history of liver disease, he does not drink alcohol, he has multiple tattoos, has a history of IV narcotic use, he is on chronic methadone therapy due to his history of narcotic abuse, he has not used any illegal drugs in many years  Last reported alcohol intake was over 20 years ago    No fever no chills, no acute pain, specifically no abdominal pain, he just had significant bloating to the point where it was making it hard to breathe  Cardiovascular: An irregular rhythm present  Bradycardia present  3+ pitting edema b/l LE  He exhibits ascites  Obese abdomen edema  Patient had paracentesis performed in the emergency room  7900ml clear yellow fluid removed via paracentesis  Labs Sent  Patient admitted INPATIENT Cirrhosis of liver with ascites  Patient thinks he had COVID 19 a month ago but infection had resolved  Tested positive ED will isolate and d/w ID  Started diuretics lasix aldactone, fluid restriction diet low na   Checks hepatitis panel and consult GI hemochromatosis testing ordered  May need liver bx f/u ascitic fluid testing  Check echo ,lower ext doppler ,liver us with doppler  GI CONSULT  Cirrhosis of liver with ascites ([facility] [address] ) - this is a new diagnosis and new decompensation with ascites  MELD 14  H/o IVDA and ETOH (over 10 yrs ago) so unclear etiology of cirrhosis  Paracentesis 5/26 removed 7 8L and negative for SBP    Agree with diuresis strick I&o,daily weight low na diet , fluid restriction  Awaiting acute viral hep panel, continue remainder of cirrhosis workup including AFP  COVID+ - he states he was sick w [employer] a month ago, was cleared to go back to work 2 weeks ago, but testing at adm still positive and pt in isolation currently  No F/C/CP/SOB  Leukopenia - WBC 1 57 today  May be 2/2 recent COVID infection, will need to be monitored carefully    ED Triage Vitals   Temperature Pulse Respirations Blood Pressure SpO2   05/26/20 1239 05/26/20 1209 05/26/20 1209 05/26/20 1209 05/26/20 1209   98 8 °F (37 1 °C) (!) 53 18 109/81 99 %      Temp Source Heart Rate Source Patient Position - Orthostatic VS BP Location FiO2 (%)   05/26/20 1239 05/26/20 1209 05/26/20 1209 05/26/20 1209 --   Oral Monitor Lying Right arm       Pain Score       05/26/20 2020       4        Wt Readings from Last 1 Encounters:   05/27/20 90 9 kg (200 lb 6 4 oz)     Additional Vital Signs:   05/27/20 08:33:41  97 9 °F (36 6 °C)  59  18  113/68  83 100 %       05/27/20 01:34:53  98 °F (36 7 °C)  61    111/68  82  97 %       05/26/20 17:49:47  98 4 °F (36 9 °C)  81  16  117/72  87  99 %       05/26/20 1700    99  20  151/71  102  98 %  None (Room air)     05/26/20 1645    84  20  154/70  101  95 %  None (Room air)       Pertinent Labs/Diagnostic Test Results:   5/26 CT abdomen pelvis Cirrhosis with evidence of portal hypertension and massive splenomegaly  Large volume of abdominopelvic ascites   Fluid extends into both inguinal canals and into the scrotum  Diffuse body wall anasarca  Trace left pleural effusion  cxr No acute cardiopulmonary disease    Results from last 7 days   Lab Units 05/26/20  1248   SARS-COV-2  Positive*     Results from last 7 days   Lab Units 05/27/20  0637 05/26/20  1222   WBC Thousand/uL 1 57* 3 62*   HEMOGLOBIN g/dL 11 2* 11 8*   HEMATOCRIT % 34 0* 36 8   PLATELETS Thousands/uL 30* 56*   NEUTROS ABS Thousands/µL 0 93* 2 17     Results from last 7 days   Lab Units 05/27/20  0637 05/26/20  1222   SODIUM mmol/L 138 138   POTASSIUM mmol/L 3 9 3 6   CHLORIDE mmol/L 104 103   CO2 mmol/L 32 31   ANION GAP mmol/L 2* 4   BUN mg/dL 13 16   CREATININE mg/dL 0 66 0 86   EGFR ml/min/1 73sq m 115 103   CALCIUM mg/dL 7 4* 8 0*   MAGNESIUM mg/dL 1 8  --    PHOSPHORUS mg/dL 3 0  --      Results from last 7 days   Lab Units 05/27/20  0637 05/26/20  1222   AST U/L 43 46*   ALT U/L 23 30   ALK PHOS U/L 48 64   TOTAL PROTEIN g/dL 5 7* 7 1   ALBUMIN g/dL 2 0* 2 6*   TOTAL BILIRUBIN mg/dL 1 60* 2 00*     Results from last 7 days   Lab Units 05/27/20  0637 05/26/20  1222   GLUCOSE RANDOM mg/dL 74 141*     Results from last 7 days   Lab Units 05/26/20  1222   TROPONIN I ng/mL <0 02     Results from last 7 days   Lab Units 05/27/20  0637 05/26/20  1222   PROTIME seconds 19 3* 18 1*   INR  1 66* 1 53*   PTT seconds  --  38*     Results from last 7 days   Lab Units 05/26/20  1222   TSH 3RD GENERATON uIU/mL 4 812*     Results from last 7 days   Lab Units 05/27/20  0637   NT-PRO BNP pg/mL 866*     Results from last 7 days   Lab Units 05/26/20  1222   LIPASE u/L 188     Results from last 7 days   Lab Units 05/26/20  1841   CLARITY UA  Clear   COLOR UA  Straw   SPEC GRAV UA  1 015   PH UA  7 5   GLUCOSE UA mg/dl Negative   KETONES UA mg/dl Negative   BLOOD UA  Negative   PROTEIN UA mg/dl Negative   NITRITE UA  Negative   BILIRUBIN UA  Negative   UROBILINOGEN UA E U /dl 2 0*   LEUKOCYTES UA  Negative   WBC UA /hpf None Seen   RBC UA /hpf None Seen   BACTERIA UA /hpf Occasional   EPITHELIAL CELLS WET PREP /hpf None Seen   CREATININE UR mg/dL 23 6   PROTEIN UR mg/dL 8   PROT/CREAT RATIO UR  0 34*     Results from last 7 days   Lab Units 05/26/20  1711 05/26/20  1223 05/26/20  1222   BLOOD CULTURE   --  Received in Microbiology Lab  Culture in Progress  Received in Microbiology Lab  Culture in Progress     GRAM STAIN RESULT  1+ Polys  No organisms seen  --   --      Results from last 7 days   Lab Units 05/26/20  1711   TOTAL COUNTED  100   WBC FLUID /ul 113       ED Treatment:   Medication Administration from 05/26/2020 1200 to 05/26/2020 1742       Date/Time Order Dose Route Action Action by Comments     05/26/2020 1437 iohexol (OMNIPAQUE) 350 MG/ML injection (MULTI-DOSE) 100 mL 100 mL Intravenous Given Shivani Guzman         Past Medical History:   Diagnosis Date    Ascites     Cirrhosis ([facility] )      Present on Admission:   Cirrhosis of liver with ascites ([facility] )   COVID-19   Methadone maintenance therapy patient ([facility] )   Pancytopenia ([facility] )   Cirrhosis ([facility] )   Ascites      Admitting Diagnosis: Splenomegaly [R16 1]  Cirrhosis ([facility] ) [K74 60]  Bloating [R14 0]  Bigeminy [I49 9]  Ascites [R18 8]  COVID-19 [U07 1]  Age/Sex: 54 y o  male  Admission Orders:  Contact airborne isolation  Tele mon  Daily weight I/o  Echo  Hemochromatosis mutation  Hepatitis panel acute  afp tumor   Iron panel  Ceruloplasmin  Lucila screen   Anti smooth muscle antibody igg   antimitochondrial antibody    Scheduled Medications:    Medications:  furosemide 40 mg Oral Daily   methadone 100 mg Oral Daily   spironolactone 100 mg Oral Daily     Continuous IV Infusions:     PRN Meds:    acetaminophen 650 mg Oral Q8H PRN       IP CONSULT TO GASTROENTEROLOGY    Network Utilization Review Department  [email or web address]  ATTENTION: Please call with any questions or concerns to [phone] and carefully listen to the prompts so that you are directed to the right person  All voicemails are confidential   Zena Tamayo all requests for admission clinical reviews, approved or denied determinations and any other requests to dedicated fax number below belonging to the campus where the patient is receiving treatment   List of dedicated fax numbers for the Facilities:  [address] DENIALS (Administrative/Medical Necessity) [phone]   [address] (Maternity/NICU/Pediatrics) [phone]   Conda Sas [phone]   Jamas Piggs [phone]   Kristi Dress [phone]   Levell Pleasure [phone]   [address] [phone]   [facility]  [phone]   [address] [facility]  [address] [facility] [address] [facility] [phone]

## 2020-05-27 NOTE — SOCIAL WORK
LOS: 1 day  Pt is not a documented bundle  Pt is not a 30 day readmission  Call placed to Pt in hospital room due to COVID+ precautions  Discussed role of , discharge planning, identifying help at home and discharge preference  Pt reports he lives with his dad and son in [facility], steps to enter  Pt reports he is independent with adls and ambulation  Pt reports he uses [facility] on [address]	[place]  Pt reports he does not have POA or living will  Pt denies hx of VNA  Pt reports he was in treatment years ago for drug and alcohol  Pt reports he is currently on methadone  Pt reports his father or his son will help him at home if needed  Pt reports his preference is to return home and does not anticipate discharge needs  Pt reports his father will transport him home upon discharge  CM to follow

## 2020-05-27 NOTE — PLAN OF CARE
Problem: PAIN - ADULT  Goal: Verbalizes/displays adequate comfort level or baseline comfort level  Description  Interventions:  - Encourage patient to monitor pain and request assistance  - Assess pain using appropriate pain scale  - Administer analgesics based on type and severity of pain and evaluate response  - Implement non-pharmacological measures as appropriate and evaluate response  - Consider cultural and social influences on pain and pain management  - Notify physician/advanced practitioner if interventions unsuccessful or patient reports new pain  Outcome: Progressing     Problem: INFECTION - ADULT  Goal: Absence or prevention of progression during hospitalization  Description  INTERVENTIONS:  - Assess and monitor for signs and symptoms of infection  - Monitor lab/diagnostic results  - Monitor all insertion sites, i e  indwelling lines, tubes, and drains  - Monitor endotracheal if appropriate and nasal secretions for changes in amount and color  - Wellman appropriate cooling/warming therapies per order  - Administer medications as ordered  - Instruct and encourage patient and family to use good hand hygiene technique  - Identify and instruct in appropriate isolation precautions for identified infection/condition  Outcome: Progressing  Goal: Absence of fever/infection during neutropenic period  Description  INTERVENTIONS:  - Monitor WBC    Outcome: Progressing     Problem: SAFETY ADULT  Goal: Patient will remain free of falls  Description  INTERVENTIONS:  - Assess patient frequently for physical needs  -  Identify cognitive and physical deficits and behaviors that affect risk of falls    -  Wellman fall precautions as indicated by assessment   - Educate patient/family on patient safety including physical limitations  - Instruct patient to call for assistance with activity based on assessment  - Modify environment to reduce risk of injury  - Consider OT/PT consult to assist with strengthening/mobility  Outcome: Progressing  Goal: Maintain or return to baseline ADL function  Description  INTERVENTIONS:  -  Assess patient's ability to carry out ADLs; assess patient's baseline for ADL function and identify physical deficits which impact ability to perform ADLs (bathing, care of mouth/teeth, toileting, grooming, dressing, etc )  - Assess/evaluate cause of self-care deficits   - Assess range of motion  - Assess patient's mobility; develop plan if impaired  - Assess patient's need for assistive devices and provide as appropriate  - Encourage maximum independence but intervene and supervise when necessary  - Involve family in performance of ADLs  - Assess for home care needs following discharge   - Consider OT consult to assist with ADL evaluation and planning for discharge  - Provide patient education as appropriate  Outcome: Progressing  Goal: Maintain or return mobility status to optimal level  Description  INTERVENTIONS:  - Assess patient's baseline mobility status (ambulation, transfers, stairs, etc )    - Identify cognitive and physical deficits and behaviors that affect mobility  - Identify mobility aids required to assist with transfers and/or ambulation (gait belt, sit-to-stand, lift, walker, cane, etc )  - Morgan fall precautions as indicated by assessment  - Record patient progress and toleration of activity level on Mobility SBAR; progress patient to next Phase/Stage  - Instruct patient to call for assistance with activity based on assessment  - Consider rehabilitation consult to assist with strengthening/weightbearing, etc   Outcome: Progressing     Problem: DISCHARGE PLANNING  Goal: Discharge to home or other facility with appropriate resources  Description  INTERVENTIONS:  - Identify barriers to discharge w/patient and caregiver  - Arrange for needed discharge resources and transportation as appropriate  - Identify discharge learning needs (meds, wound care, etc )  - Arrange for interpretive services to assist at discharge as needed  - Refer to Case Management Department for coordinating discharge planning if the patient needs post-hospital services based on physician/advanced practitioner order or complex needs related to functional status, cognitive ability, or social support system  Outcome: Progressing     Problem: Knowledge Deficit  Goal: Patient/family/caregiver demonstrates understanding of disease process, treatment plan, medications, and discharge instructions  Description  Complete learning assessment and assess knowledge base    Interventions:  - Provide teaching at level of understanding  - Provide teaching via preferred learning methods  Outcome: Progressing     Problem: RESPIRATORY - ADULT  Goal: Achieves optimal ventilation and oxygenation  Description  INTERVENTIONS:  - Assess for changes in respiratory status  - Assess for changes in mentation and behavior  - Position to facilitate oxygenation and minimize respiratory effort  - Oxygen administered by appropriate delivery if ordered  - Initiate smoking cessation education as indicated  - Encourage broncho-pulmonary hygiene including cough, deep breathe, Incentive Spirometry  - Assess the need for suctioning and aspirate as needed  - Assess and instruct to report SOB or any respiratory difficulty  - Respiratory Therapy support as indicated  Outcome: Progressing     Problem: METABOLIC, FLUID AND ELECTROLYTES - ADULT  Goal: Electrolytes maintained within normal limits  Description  INTERVENTIONS:  - Monitor labs and assess patient for signs and symptoms of electrolyte imbalances  - Administer electrolyte replacement as ordered  - Monitor response to electrolyte replacements, including repeat lab results as appropriate  - Instruct patient on fluid and nutrition as appropriate  Outcome: Progressing  Goal: Fluid balance maintained  Description  INTERVENTIONS:  - Monitor labs   - Monitor I/O and WT  - Instruct patient on fluid and nutrition as appropriate  - Assess for signs & symptoms of volume excess or deficit  Outcome: Progressing

## 2020-05-27 NOTE — CONSULTS
Consultation - PATRICK   Jony Mix 54 y o  male MRN: [id number]  Unit/Bed#: -01 Encounter: [id number]    Consults  ASSESSMENT and PLAN    1  Cirrhosis of liver with ascites ([facility] [address] ) - this is a new diagnosis and new decompensation with ascites  MELD 14  H/o IVDA and ETOH (over 10 yrs ago) so unclear etiology of cirrhosis  Paracentesis 5/26 removed 7 8L and negative for SBP     - Agree with diuresis: Lasix 40mg daily, Aldactone 100mg daily  - Strict I&O's  - Daily weights while inpatient  - Low sodium (less than 2 g daily) diet, rec nutrition consult  - Fluid restriction  - Awaiting acute viral hep panel, but will also send the remainder of cirrhosis work up while inpatient including AFP  - CT negative for [facility] [address]  5/26/2020, no evidence of PVT  - Eventually needs EGD for varices screening  - Emphasized the importance of outpatient follow up for possible liver transplant evaluation  2  H/o IVDA - now on methadone    3  H/o ETOH abuse - no use for over 20 years    4  COVID+ - he states he was sick w [facility] a month ago, was cleared to go back to work 2 weeks ago, but testing at adm still positive and pt in isolation currently  No F/C/CP/SOB  5  Leukopenia - WBC 1 57 today  May be 2/2 recent COVID infection, will need to be monitored carefully  Chief Complaint   Patient presents with   [facility]     pt reports bloating that he noticed about a month ago  states that it is now in his legs and going down into his scrotum  states he "never goes to the doctor"      Physician Requesting Consult: Yeny Goode,     HPI Jony Mix is a 54y o  year old male who presents with new onset abd distension and anasarca  Pt states that he really has not had too much medical follow up until recently when he was diagnosed with COVID a month ago  At that time, he was cleared after being sick for 2 weeks to return to work at [employer]  However, in the past 2 months, he's noticed increasing abd girth   He thought he was just bloated and gassy  However, the distension continued to the point of discomfort  He was experiencing some SOB but then attributed this to the [place]  He has been noticing some weight loss in overall with muscle wasting the past year, but in the past few weeks, he's noticed some weight gain  Week prior, he started to notice sig leg swelling bilaterally and came to the ER  Pt denies any further SOB/ALCALA  Sig improved after the paracentesis yesterday  No abd pain/n/v  No F/C  No GIB  Bowels are normal  Denies being confused  Denies jaundice  Historical Information   Past Medical History:   Diagnosis Date    Ascites     Cirrhosis ([facility] [address] )      Past Surgical History:   Procedure Laterality Date    IR PARACENTESIS  5/26/2020     Social History   Social History     Substance and Sexual Activity   Alcohol Use Not Currently     Social History     Substance and Sexual Activity   Drug Use Not Currently    Comment: methadone for 20 years      Social History     Tobacco Use   Smoking Status Never Smoker   Smokeless Tobacco Never Used     Family History   Family history unknown: Yes       Meds/Allergies   Current Facility-Administered Medications   Medication Dose Route Frequency    acetaminophen (TYLENOL) tablet 650 mg  650 mg Oral Q8H PRN    furosemide (LASIX) tablet 40 mg  40 mg Oral Daily    methadone (DOLOPHINE) tablet 100 mg  100 mg Oral Daily    spironolactone (ALDACTONE) tablet 100 mg  100 mg Oral Daily     No medications prior to admission  No Known Allergies    PHYSICALEXAM  Blood pressure 113/68, pulse 59, temperature 97 9 °F (36 6 °C), resp  rate 18, weight 90 9 kg (200 lb 6 4 oz), SpO2 100 %  There is no height or weight on file to calculate BMI  General Appearance: NAD, cooperative, alert  Eyes: Anicteric, PERRLA, EOMI  Temporal wasting  ENT:  Normocephalic, atraumatic, normal mucosa      Neck:  Supple, symmetrical, trachea midline  Resp:  Clear to auscultation bilaterally; no rales, rhonchi or wheezing; respirations unlabored   CV:  S1 S2, Regular rate and rhythm; no murmur, rub, or gallop  GI:  Soft, non-tender, sig distended; normal bowel sounds; no masses, no organomegaly  + ascites  Rectal: Deferred  Musculoskeletal: No cyanosis, clubbing  B/L LE edema and ANASARCA  Normal ROM  Skin:  No jaundice, rashes, or lesions   Heme/Lymph: No palpable cervical lymphadenopathy  Psych: Normal affect, good eye contact  Neuro: No gross deficits, AAOx3  No asterixis  Lab Results   Component Value Date    CALCIUM 7 4 (L) 05/27/2020    K 3 9 05/27/2020    CO2 32 05/27/2020     05/27/2020    BUN 13 05/27/2020    CREATININE 0 66 05/27/2020     Lab Results   Component Value Date    WBC 1 57 (LL) 05/27/2020    HGB 11 2 (L) 05/27/2020    HCT 34 0 (L) 05/27/2020    MCV 92 05/27/2020    PLT 30 (LL) 05/27/2020     Lab Results   Component Value Date    ALT 23 05/27/2020    AST 43 05/27/2020    ALKPHOS 48 05/27/2020     No results found for: AMYLASE  Lab Results   Component Value Date    LIPASE 188 05/26/2020     No results found for: IRON, TIBC, FERRITIN  Lab Results   Component Value Date    INR 1 66 (H) 05/27/2020       Imaging Studies: I have personally reviewed pertinent films in PACS    EKG, Pathology, and Other Studies: I have personally reviewed pertinent reports  REVIEW OF SYSTEMS:    CONSTITUTIONAL: + fever, chills, rigors, and weight loss  HEENT: No earache or tinnitus  Denies hearing loss or visual disturbances  CARDIOVASCULAR: No chest pain or palpitations  RESPIRATORY: + cough, shortness of breath and dyspnea on exertion prior to admission  GASTROINTESTINAL: As noted in the History of Present Illness  GENITOURINARY: No problems with urination  Denies any hematuria or dysuria  NEUROLOGIC: No dizziness or vertigo, denies headaches  MUSCULOSKELETAL: Denies any muscle or joint pain  + temporal wasting  SKIN: Denies skin rashes or itching     ENDOCRINE: Denies excessive thirst  Denies intolerance to heat or cold  PSYCHOSOCIAL: Denies depression or anxiety  Denies any recent memory loss

## 2020-05-27 NOTE — PLAN OF CARE
Problem: PAIN - ADULT  Goal: Verbalizes/displays adequate comfort level or baseline comfort level  Description  Interventions:  - Encourage patient to monitor pain and request assistance  - Assess pain using appropriate pain scale  - Administer analgesics based on type and severity of pain and evaluate response  - Implement non-pharmacological measures as appropriate and evaluate response  - Consider cultural and social influences on pain and pain management  - Notify physician/advanced practitioner if interventions unsuccessful or patient reports new pain  Outcome: Progressing     Problem: INFECTION - ADULT  Goal: Absence or prevention of progression during hospitalization  Description  INTERVENTIONS:  - Assess and monitor for signs and symptoms of infection  - Monitor lab/diagnostic results  - Monitor all insertion sites, i e  indwelling lines, tubes, and drains  - Monitor endotracheal if appropriate and nasal secretions for changes in amount and color  - [place] appropriate cooling/warming therapies per order  - Administer medications as ordered  - Instruct and encourage patient and family to use good hand hygiene technique  - Identify and instruct in appropriate isolation precautions for identified infection/condition  Outcome: Progressing  Goal: Absence of fever/infection during neutropenic period  Description  INTERVENTIONS:  - Monitor WBC    Outcome: Progressing     Problem: SAFETY ADULT  Goal: Patient will remain free of falls  Description  INTERVENTIONS:  - Assess patient frequently for physical needs  -  Identify cognitive and physical deficits and behaviors that affect risk of falls    -  [place] fall precautions as indicated by assessment   - Educate patient/family on patient safety including physical limitations  - Instruct patient to call for assistance with activity based on assessment  - Modify environment to reduce risk of injury  - Consider OT/PT consult to assist with strengthening/mobility  Outcome: Progressing  Goal: Maintain or return to baseline ADL function  Description  INTERVENTIONS:  -  Assess patient's ability to carry out ADLs; assess patient's baseline for ADL function and identify physical deficits which impact ability to perform ADLs (bathing, care of mouth/teeth, toileting, grooming, dressing, etc )  - Assess/evaluate cause of self-care deficits   - Assess range of motion  - Assess patient's mobility; develop plan if impaired  - Assess patient's need for assistive devices and provide as appropriate  - Encourage maximum independence but intervene and supervise when necessary  - Involve family in performance of ADLs  - Assess for home care needs following discharge   - Consider OT consult to assist with ADL evaluation and planning for discharge  - Provide patient education as appropriate  Outcome: Progressing  Goal: Maintain or return mobility status to optimal level  Description  INTERVENTIONS:  - Assess patient's baseline mobility status (ambulation, transfers, stairs, etc )    - Identify cognitive and physical deficits and behaviors that affect mobility  - Identify mobility aids required to assist with transfers and/or ambulation (gait belt, sit-to-stand, lift, walker, cane, etc )  - [place] fall precautions as indicated by assessment  - Record patient progress and toleration of activity level on Mobility SBAR; progress patient to next Phase/Stage  - Instruct patient to call for assistance with activity based on assessment  - Consider rehabilitation consult to assist with strengthening/weightbearing, etc   Outcome: Progressing     Problem: DISCHARGE PLANNING  Goal: Discharge to home or other facility with appropriate resources  Description  INTERVENTIONS:  - Identify barriers to discharge w/patient and caregiver  - Arrange for needed discharge resources and transportation as appropriate  - Identify discharge learning needs (meds, wound care, etc )  - Arrange for interpretive services to assist at discharge as needed  - Refer to Case Management Department for coordinating discharge planning if the patient needs post-hospital services based on physician/advanced practitioner order or complex needs related to functional status, cognitive ability, or social support system  Outcome: Progressing     Problem: Knowledge Deficit  Goal: Patient/family/caregiver demonstrates understanding of disease process, treatment plan, medications, and discharge instructions  Description  Complete learning assessment and assess knowledge base    Interventions:  - Provide teaching at level of understanding  - Provide teaching via preferred learning methods  Outcome: Progressing     Problem: RESPIRATORY - ADULT  Goal: Achieves optimal ventilation and oxygenation  Description  INTERVENTIONS:  - Assess for changes in respiratory status  - Assess for changes in mentation and behavior  - Position to facilitate oxygenation and minimize respiratory effort  - Oxygen administered by appropriate delivery if ordered  - Initiate smoking cessation education as indicated  - Encourage broncho-pulmonary hygiene including cough, deep breathe, Incentive Spirometry  - Assess the need for suctioning and aspirate as needed  - Assess and instruct to report SOB or any respiratory difficulty  - Respiratory Therapy support as indicated  Outcome: Progressing     Problem: METABOLIC, FLUID AND ELECTROLYTES - ADULT  Goal: Electrolytes maintained within normal limits  Description  INTERVENTIONS:  - Monitor labs and assess patient for signs and symptoms of electrolyte imbalances  - Administer electrolyte replacement as ordered  - Monitor response to electrolyte replacements, including repeat lab results as appropriate  - Instruct patient on fluid and nutrition as appropriate  Outcome: Progressing  Goal: Fluid balance maintained  Description  INTERVENTIONS:  - Monitor labs   - Monitor I/O and WT  - Instruct patient on fluid and nutrition as appropriate  - Assess for signs & symptoms of volume excess or deficit  Outcome: Progressing     Problem: Prexisting or High Potential for Compromised Skin Integrity  Goal: Skin integrity is maintained or improved  Description  INTERVENTIONS:  - Identify patients at risk for skin breakdown  - Assess and monitor skin integrity  - Assess and monitor nutrition and hydration status  - Monitor labs   - Assess for incontinence   - Turn and reposition patient  - Assist with mobility/ambulation  - Relieve pressure over bony prominences  - Avoid friction and shearing  - Provide appropriate hygiene as needed including keeping skin clean and dry  - Evaluate need for skin moisturizer/barrier cream  - Collaborate with interdisciplinary team   - Patient/family teaching  - Consider wound care consult   Outcome: Progressing

## 2020-05-28 LAB
ALBUMIN SERPL BCP-MCNC: 2.1 G/DL (ref 3.5–5)
ALP SERPL-CCNC: 55 U/L (ref 46–116)
ALT SERPL W P-5'-P-CCNC: 25 U/L (ref 12–78)
ANION GAP SERPL CALCULATED.3IONS-SCNC: 1 MMOL/L (ref 4–13)
AST SERPL W P-5'-P-CCNC: 42 U/L (ref 5–45)
BASOPHILS # BLD AUTO: 0.01 THOUSANDS/ΜL (ref 0–0.1)
BASOPHILS NFR BLD AUTO: 1 % (ref 0–1)
BILIRUB SERPL-MCNC: 1.4 MG/DL (ref 0.2–1)
BUN SERPL-MCNC: 14 MG/DL (ref 5–25)
CALCIUM SERPL-MCNC: 7.6 MG/DL (ref 8.3–10.1)
CERULOPLASMIN SERPL-MCNC: 24.1 MG/DL (ref 16–31)
CHLORIDE SERPL-SCNC: 103 MMOL/L (ref 100–108)
CO2 SERPL-SCNC: 32 MMOL/L (ref 21–32)
CREAT SERPL-MCNC: 0.69 MG/DL (ref 0.6–1.3)
ENDOMYSIUM IGA SER QL: NEGATIVE
EOSINOPHIL # BLD AUTO: 0.1 THOUSAND/ΜL (ref 0–0.61)
EOSINOPHIL NFR BLD AUTO: 5 % (ref 0–6)
ERYTHROCYTE [DISTWIDTH] IN BLOOD BY AUTOMATED COUNT: 16.3 % (ref 11.6–15.1)
GFR SERPL CREATININE-BSD FRML MDRD: 113 ML/MIN/1.73SQ M
GLIADIN PEPTIDE IGA SER-ACNC: 15 UNITS (ref 0–19)
GLIADIN PEPTIDE IGG SER-ACNC: 4 UNITS (ref 0–19)
GLUCOSE SERPL-MCNC: 85 MG/DL (ref 65–140)
HBV SURFACE AB SER-ACNC: <3.1 MIU/ML
HCT VFR BLD AUTO: 34.3 % (ref 36.5–49.3)
HGB BLD-MCNC: 11.2 G/DL (ref 12–17)
IGA SERPL-MCNC: 417 MG/DL (ref 90–386)
IMM GRANULOCYTES # BLD AUTO: 0 THOUSAND/UL (ref 0–0.2)
IMM GRANULOCYTES NFR BLD AUTO: 0 % (ref 0–2)
INR PPP: 1.58 (ref 0.84–1.19)
LYMPHOCYTES # BLD AUTO: 0.56 THOUSANDS/ΜL (ref 0.6–4.47)
LYMPHOCYTES NFR BLD AUTO: 26 % (ref 14–44)
MAGNESIUM SERPL-MCNC: 1.9 MG/DL (ref 1.6–2.6)
MCH RBC QN AUTO: 29.9 PG (ref 26.8–34.3)
MCHC RBC AUTO-ENTMCNC: 32.7 G/DL (ref 31.4–37.4)
MCV RBC AUTO: 92 FL (ref 82–98)
MONOCYTES # BLD AUTO: 0.2 THOUSAND/ΜL (ref 0.17–1.22)
MONOCYTES NFR BLD AUTO: 9 % (ref 4–12)
NEUTROPHILS # BLD AUTO: 1.29 THOUSANDS/ΜL (ref 1.85–7.62)
NEUTS SEG NFR BLD AUTO: 59 % (ref 43–75)
NRBC BLD AUTO-RTO: 0 /100 WBCS
PHOSPHATE SERPL-MCNC: 3.3 MG/DL (ref 2.7–4.5)
PLATELET # BLD AUTO: 42 THOUSANDS/UL (ref 149–390)
PMV BLD AUTO: 10.6 FL (ref 8.9–12.7)
POTASSIUM SERPL-SCNC: 4.5 MMOL/L (ref 3.5–5.3)
PROT SERPL-MCNC: 6.1 G/DL (ref 6.4–8.2)
PROTHROMBIN TIME: 18.5 SECONDS (ref 11.6–14.5)
RBC # BLD AUTO: 3.75 MILLION/UL (ref 3.88–5.62)
SODIUM SERPL-SCNC: 136 MMOL/L (ref 136–145)
TTG IGA SER-ACNC: <2 U/ML (ref 0–3)
TTG IGG SER-ACNC: 3 U/ML (ref 0–5)
WBC # BLD AUTO: 2.16 THOUSAND/UL (ref 4.31–10.16)

## 2020-05-28 PROCEDURE — 85610 PROTHROMBIN TIME: CPT | Performed by: INTERNAL MEDICINE

## 2020-05-28 PROCEDURE — 87522 HEPATITIS C REVRS TRNSCRPJ: CPT | Performed by: INTERNAL MEDICINE

## 2020-05-28 PROCEDURE — 85025 COMPLETE CBC W/AUTO DIFF WBC: CPT | Performed by: INTERNAL MEDICINE

## 2020-05-28 PROCEDURE — 82977 ASSAY OF GGT: CPT | Performed by: INTERNAL MEDICINE

## 2020-05-28 PROCEDURE — 99232 SBSQ HOSP IP/OBS MODERATE 35: CPT | Performed by: INTERNAL MEDICINE

## 2020-05-28 PROCEDURE — 80053 COMPREHEN METABOLIC PANEL: CPT | Performed by: INTERNAL MEDICINE

## 2020-05-28 PROCEDURE — 82172 ASSAY OF APOLIPOPROTEIN: CPT | Performed by: INTERNAL MEDICINE

## 2020-05-28 PROCEDURE — 83735 ASSAY OF MAGNESIUM: CPT | Performed by: INTERNAL MEDICINE

## 2020-05-28 PROCEDURE — 87902 NFCT AGT GNTYP ALYS HEP C: CPT | Performed by: INTERNAL MEDICINE

## 2020-05-28 PROCEDURE — 84100 ASSAY OF PHOSPHORUS: CPT | Performed by: INTERNAL MEDICINE

## 2020-05-28 PROCEDURE — 83010 ASSAY OF HAPTOGLOBIN QUANT: CPT | Performed by: INTERNAL MEDICINE

## 2020-05-28 PROCEDURE — 84460 ALANINE AMINO (ALT) (SGPT): CPT | Performed by: INTERNAL MEDICINE

## 2020-05-28 PROCEDURE — 86706 HEP B SURFACE ANTIBODY: CPT | Performed by: INTERNAL MEDICINE

## 2020-05-28 PROCEDURE — 83883 ASSAY NEPHELOMETRY NOT SPEC: CPT | Performed by: INTERNAL MEDICINE

## 2020-05-28 PROCEDURE — 82247 BILIRUBIN TOTAL: CPT | Performed by: INTERNAL MEDICINE

## 2020-05-28 RX ORDER — METHADONE HYDROCHLORIDE 10 MG/1
100 TABLET ORAL DAILY
COMMUNITY
End: 2020-07-27 | Stop reason: DRUGHIGH

## 2020-05-28 RX ORDER — FUROSEMIDE 40 MG/1
40 TABLET ORAL
Status: DISCONTINUED | OUTPATIENT
Start: 2020-05-28 | End: 2020-05-29 | Stop reason: HOSPADM

## 2020-05-28 RX ADMIN — FUROSEMIDE 40 MG: 40 TABLET ORAL at 16:17

## 2020-05-28 RX ADMIN — FUROSEMIDE 40 MG: 40 TABLET ORAL at 08:13

## 2020-05-28 RX ADMIN — METHADONE HYDROCHLORIDE 100 MG: 10 TABLET ORAL at 08:13

## 2020-05-28 RX ADMIN — SPIRONOLACTONE 100 MG: 25 TABLET ORAL at 08:13

## 2020-05-28 NOTE — PROGRESS NOTES
Progress note - Gastroenterology   Ambrose Gabriel 54 y o  male MRN: [id number]  Unit/Bed#: -01 Encounter: [id number]    ASSESSMENT and PLAN    1  Cirrhosis of liver with ascites ([address] ) - this is a new diagnosis and new decompensation with ascites  MELD 14  H/o IVDA and ETOH (over 10 yrs ago)  +HCV ANTIBODY  Paracentesis 5/26 removed 7 8L and negative for SBP     - Weight has not been reliable as some are using bed scale and some using standing scale  RN rechecked standing scale today from admission weight - doesn't look like weight has gone down much   - Repeat paracentesis today - therapeutic, pt feeling uncomfortable from the ascites  - Agree with diuresis: Increase Lasix 40mg BID, continue Aldactone 100mg daily  - Strict I&O's  - Daily weights while inpatient - recommend using one TYPE of scale (ideally standing scale)  - Low sodium (less than 2 g daily) diet, rec nutrition consult  - Fluid restriction  - Positive for Hep C Ab, awaiting GENOTYPE and VIRAL LOAD  Treatment will be outpatient  Also sent the remainder of cirrhosis work up while inpatient including AFP to avoid lab exposure to [place] + patient   - [facility]  5/26/2020, no evidence of PVT  - Eventually needs EGD for varices screening  - Emphasized the importance of outpatient follow up for possible liver transplant evaluation       2  H/o IVDA - now on methadone     3  H/o ETOH abuse - no use for over 20 years     4  COVID+ - he states he was sick w [employer] a month ago, was cleared to go back to work 2 weeks ago, but testing at adm still positive and pt in isolation currently  No F/C/CP/SOB       5  Pancytopenia- WBC slowly improving  May be 2/2 recent COVID infection, will need to be monitored carefully  Also likely due to cirrhosis given the thrombocytopenia        Chief Complaint   Patient presents with   [facility]     pt reports bloating that he noticed about a month ago   states that it is now in his legs and going down into his scrotum  states he "never goes to the doctor"        SUBJECTIVE/HPI   Some SOB due to increasing abdominal girth    /83 (BP Location: Right arm)   Pulse 67   Temp 98 °F (36 7 °C) (Oral)   Resp 16   Ht 5' 4" (1 626 m)   Wt 91 4 kg (201 lb 6 4 oz)   SpO2 97%   BMI 34 57 kg/m²     PHYSICALEXAM  General appearance: alert, appears stated age and cooperative  Eyes: PERLLA, EOMI, no icterus   Head: Normocephalic, without obvious abnormality, atraumatic  Lungs: clear to auscultation bilaterally  Heart: regular rate and rhythm, S1, S2 normal, no murmur, click, rub or gallop  Abdomen: soft,distended, non-tender; bowel sounds normal; no masses,  no organomegaly  + ASCITES  Extremities: extremities normal, atraumatic, no cyanosis or edema  Neurologic: Grossly normal  No asterixis      Lab Results   Component Value Date    CALCIUM 7 6 (L) 05/28/2020    K 4 5 05/28/2020    CO2 32 05/28/2020     05/28/2020    BUN 14 05/28/2020    CREATININE 0 69 05/28/2020     Lab Results   Component Value Date    WBC 2 16 (L) 05/28/2020    HGB 11 2 (L) 05/28/2020    HCT 34 3 (L) 05/28/2020    MCV 92 05/28/2020    PLT 42 (LL) 05/28/2020     Lab Results   Component Value Date    ALT 25 05/28/2020    AST 42 05/28/2020    ALKPHOS 55 05/28/2020     No results found for: AMYLASE  Lab Results   Component Value Date    LIPASE 188 05/26/2020     Lab Results   Component Value Date    IRON 48 (L) 05/27/2020    TIBC 187 (L) 05/27/2020    FERRITIN 161 05/27/2020     Lab Results   Component Value Date    INR 1 58 (H) 05/28/2020

## 2020-05-28 NOTE — PROGRESS NOTES
Introduced myself to pt over the phone as per Covid 19 directive  Pt permitted me to orient him to pastoral care  Reported no needs for support or experience of distress when asked  Said he had outside support but was rather vague        05/28/20 1200   Stress Factors   Patient Stress Factors None identified   Coping Responses   Patient Coping Accepting;Open/discussion   Plan of Care   Assessment Completed by: Phone

## 2020-05-28 NOTE — PROGRESS NOTES
Progress Note - Pramod Howell 1970, 54 y o  male MRN: [id number]    Unit/Bed#: -01 Encounter: [id number]    Primary Care Provider: No primary care provider on file  Date and time admitted to hospital: 5/26/2020 12:08 PM        * Cirrhosis of liver with ascites [facility])  Assessment & Plan  New diagnosis of liver disease reports markedly worsening edema the last 1 to 2 weeks  Patient denies any active alcohol use, has not had any significant alcohol intake in more than 20 years  Suspected hep C cirrhosis GI appreciated some serologies are still pending  Case discussed with Gastroenterology    Increased diuretics and monitor ascites, patient is becoming symptomatic from the ascites and may need paracentesis prior to discharge    COVID-19  Assessment & Plan  I do not think that the patient has an active COVID-19 infection, likely has residual positive testing from recent infection  Maintain isolation  Pancytopenia ([facility] [address] )  Assessment & Plan  Pancytopenia, present on admission, likely secondary to underlying cirrhosis  Methadone maintenance therapy patient [facility])  Assessment & Plan  Continue methadone        VTE Pharmacologic Prophylaxis:   Pharmacologic: Pharmacologic VTE Prophylaxis contraindicated due to Thrombocytopenia  Mechanical VTE Prophylaxis in Place: No    Patient Centered Rounds: I have performed bedside rounds with nursing staff today  Discussions with Specialists or Other Care Team Provider:  GI    Education and Discussions with Family / Patient:  Patient updated with plan of care    Time Spent for Care: 30 minutes  More than 50% of total time spent on counseling and coordination of care as described above      Current Length of Stay: 2 day(s)    Current Patient Status: Inpatient   Certification Statement: The patient will continue to require additional inpatient hospital stay due to Symptomatic ascites requiring increased diuretics    Discharge Plan / Estimated Discharge Date:  2020    Code Status: Level 1 - Full Code      Subjective:   Patient complaining of increased abdominal fullness, patient reports difficulty with urination due to increasing abdominal ascites  Objective:     Vitals:   Temp (24hrs), Av 2 °F (36 8 °C), Min:98 °F (36 7 °C), Max:98 3 °F (36 8 °C)    Temp:  [98 °F (36 7 °C)-98 3 °F (36 8 °C)] 98 °F (36 7 °C)  HR:  [64-67] 64  Resp:  [16] 16  BP: (118-121)/(77-83) 120/83  SpO2:  [96 %-98 %] 97 %  Body mass index is 34 57 kg/m²  Input and Output Summary (last 24 hours): Intake/Output Summary (Last 24 hours) at 2020 1537  Last data filed at 2020 1424  Gross per 24 hour   Intake 880 ml   Output 300 ml   Net 580 ml       Physical Exam:     Physical Exam   Constitutional: He appears well-developed and well-nourished  No distress  Cardiovascular: Normal rate, regular rhythm, normal heart sounds and intact distal pulses  Pulmonary/Chest: Effort normal and breath sounds normal  No stridor  No respiratory distress  Musculoskeletal: Normal range of motion  He exhibits no edema, tenderness or deformity  Skin: He is not diaphoretic  Nursing note and vitals reviewed  Additional Data:     Labs:    Results from last 7 days   Lab Units 20  0613   WBC Thousand/uL 2 16*   HEMOGLOBIN g/dL 11 2*   HEMATOCRIT % 34 3*   PLATELETS Thousands/uL 42*   NEUTROS PCT % 59   LYMPHS PCT % 26   MONOS PCT % 9   EOS PCT % 5     Results from last 7 days   Lab Units 20  0613   POTASSIUM mmol/L 4 5   CHLORIDE mmol/L 103   CO2 mmol/L 32   BUN mg/dL 14   CREATININE mg/dL 0 69   CALCIUM mg/dL 7 6*   ALK PHOS U/L 55   ALT U/L 25   AST U/L 42     Results from last 7 days   Lab Units 20  0613   INR  1 58*       * I Have Reviewed All Lab Data Listed Above  * Additional Pertinent Lab Tests Reviewed:  Violet 66 Admission Reviewed      Recent Cultures (last 7 days):     Results from last 7 days   Lab Units 20  7131 05/26/20  1223 05/26/20  1222   BLOOD CULTURE   --  No Growth at 24 hrs  No Growth at 24 hrs     GRAM STAIN RESULT  1+ Polys  No organisms seen  --   --    BODY FLUID CULTURE, STERILE  No growth  --   --        Last 24 Hours Medication List:     Current Facility-Administered Medications:  acetaminophen 650 mg Oral Q8H PRN Franki Swan DO   furosemide 40 mg Oral BID (diuretic) Sarah Stewart MD   methadone 100 mg Oral Daily Franki Swan DO   spironolactone 100 mg Oral Daily Franki Swan DO        Today, Patient Was Seen By: Franki Swan DO

## 2020-05-28 NOTE — ASSESSMENT & PLAN NOTE
New diagnosis of liver disease reports markedly worsening edema the last 1 to 2 weeks  Patient denies any active alcohol use, has not had any significant alcohol intake in more than 20 years      Suspected hep C cirrhosis GI appreciated some serologies are still pending  Case discussed with Gastroenterology    Increased diuretics and monitor ascites, patient is becoming symptomatic from the ascites and may need paracentesis prior to discharge

## 2020-05-28 NOTE — PLAN OF CARE
Problem: PAIN - ADULT  Goal: Verbalizes/displays adequate comfort level or baseline comfort level  Description  Interventions:  - Encourage patient to monitor pain and request assistance  - Assess pain using appropriate pain scale  - Administer analgesics based on type and severity of pain and evaluate response  - Implement non-pharmacological measures as appropriate and evaluate response  - Consider cultural and social influences on pain and pain management  - Notify physician/advanced practitioner if interventions unsuccessful or patient reports new pain  Outcome: Progressing     Problem: INFECTION - ADULT  Goal: Absence or prevention of progression during hospitalization  Description  INTERVENTIONS:  - Assess and monitor for signs and symptoms of infection  - Monitor lab/diagnostic results  - Monitor all insertion sites, i e  indwelling lines, tubes, and drains  - Monitor endotracheal if appropriate and nasal secretions for changes in amount and color  - [place] appropriate cooling/warming therapies per order  - Administer medications as ordered  - Instruct and encourage patient and family to use good hand hygiene technique  - Identify and instruct in appropriate isolation precautions for identified infection/condition  Outcome: Progressing  Goal: Absence of fever/infection during neutropenic period  Description  INTERVENTIONS:  - Monitor WBC    Outcome: Progressing     Problem: SAFETY ADULT  Goal: Patient will remain free of falls  Description  INTERVENTIONS:  - Assess patient frequently for physical needs  -  Identify cognitive and physical deficits and behaviors that affect risk of falls    -  [place] fall precautions as indicated by assessment   - Educate patient/family on patient safety including physical limitations  - Instruct patient to call for assistance with activity based on assessment  - Modify environment to reduce risk of injury  - Consider OT/PT consult to assist with strengthening/mobility  Outcome: Progressing  Goal: Maintain or return to baseline ADL function  Description  INTERVENTIONS:  -  Assess patient's ability to carry out ADLs; assess patient's baseline for ADL function and identify physical deficits which impact ability to perform ADLs (bathing, care of mouth/teeth, toileting, grooming, dressing, etc )  - Assess/evaluate cause of self-care deficits   - Assess range of motion  - Assess patient's mobility; develop plan if impaired  - Assess patient's need for assistive devices and provide as appropriate  - Encourage maximum independence but intervene and supervise when necessary  - Involve family in performance of ADLs  - Assess for home care needs following discharge   - Consider OT consult to assist with ADL evaluation and planning for discharge  - Provide patient education as appropriate  Outcome: Progressing  Goal: Maintain or return mobility status to optimal level  Description  INTERVENTIONS:  - Assess patient's baseline mobility status (ambulation, transfers, stairs, etc )    - Identify cognitive and physical deficits and behaviors that affect mobility  - Identify mobility aids required to assist with transfers and/or ambulation (gait belt, sit-to-stand, lift, walker, cane, etc )  - [place] fall precautions as indicated by assessment  - Record patient progress and toleration of activity level on Mobility SBAR; progress patient to next Phase/Stage  - Instruct patient to call for assistance with activity based on assessment  - Consider rehabilitation consult to assist with strengthening/weightbearing, etc   Outcome: Progressing     Problem: DISCHARGE PLANNING  Goal: Discharge to home or other facility with appropriate resources  Description  INTERVENTIONS:  - Identify barriers to discharge w/patient and caregiver  - Arrange for needed discharge resources and transportation as appropriate  - Identify discharge learning needs (meds, wound care, etc )  - Arrange for interpretive services to assist at discharge as needed  - Refer to Case Management Department for coordinating discharge planning if the patient needs post-hospital services based on physician/advanced practitioner order or complex needs related to functional status, cognitive ability, or social support system  Outcome: Progressing     Problem: Knowledge Deficit  Goal: Patient/family/caregiver demonstrates understanding of disease process, treatment plan, medications, and discharge instructions  Description  Complete learning assessment and assess knowledge base    Interventions:  - Provide teaching at level of understanding  - Provide teaching via preferred learning methods  Outcome: Progressing     Problem: RESPIRATORY - ADULT  Goal: Achieves optimal ventilation and oxygenation  Description  INTERVENTIONS:  - Assess for changes in respiratory status  - Assess for changes in mentation and behavior  - Position to facilitate oxygenation and minimize respiratory effort  - Oxygen administered by appropriate delivery if ordered  - Initiate smoking cessation education as indicated  - Encourage broncho-pulmonary hygiene including cough, deep breathe, Incentive Spirometry  - Assess the need for suctioning and aspirate as needed  - Assess and instruct to report SOB or any respiratory difficulty  - Respiratory Therapy support as indicated  Outcome: Progressing     Problem: METABOLIC, FLUID AND ELECTROLYTES - ADULT  Goal: Electrolytes maintained within normal limits  Description  INTERVENTIONS:  - Monitor labs and assess patient for signs and symptoms of electrolyte imbalances  - Administer electrolyte replacement as ordered  - Monitor response to electrolyte replacements, including repeat lab results as appropriate  - Instruct patient on fluid and nutrition as appropriate  Outcome: Progressing  Goal: Fluid balance maintained  Description  INTERVENTIONS:  - Monitor labs   - Monitor I/O and WT  - Instruct patient on fluid and nutrition as appropriate  - Assess for signs & symptoms of volume excess or deficit  Outcome: Progressing     Problem: Prexisting or High Potential for Compromised Skin Integrity  Goal: Skin integrity is maintained or improved  Description  INTERVENTIONS:  - Identify patients at risk for skin breakdown  - Assess and monitor skin integrity  - Assess and monitor nutrition and hydration status  - Monitor labs   - Assess for incontinence   - Turn and reposition patient  - Assist with mobility/ambulation  - Relieve pressure over bony prominences  - Avoid friction and shearing  - Provide appropriate hygiene as needed including keeping skin clean and dry  - Evaluate need for skin moisturizer/barrier cream  - Collaborate with interdisciplinary team   - Patient/family teaching  - Consider wound care consult   Outcome: Progressing

## 2020-05-28 NOTE — PROGRESS NOTES
Pastoral Care Progress Note    2020  Patient: Fan Brown : 1970  Admission Date & Time: 2020 1208  MRN: [id number] [facility]: [id number]                     Chaplaincy Interventions Utilized:   Empowerment: Provided chaplaincy education    Exploration: Explored emotional needs & resources, Explored relational needs & resources and Explored spiritual needs & resources        Relationship Building: Cultivated a relationship of care and support    Chaplaincy Outcomes Achieved:  Unknown outcome          Spiritual Coping Strategies Utilized:   No spiritual coping       20 1200   Stress Factors   Patient Stress Factors None identified   Coping Responses   Patient Coping Accepting;Open/discussion   Plan of Care   Assessment Completed by: Phone

## 2020-05-29 ENCOUNTER — TELEPHONE (OUTPATIENT)
Dept: GASTROENTEROLOGY | Facility: CLINIC | Age: 50
End: 2020-05-29

## 2020-05-29 VITALS
RESPIRATION RATE: 19 BRPM | HEIGHT: 64 IN | SYSTOLIC BLOOD PRESSURE: 122 MMHG | WEIGHT: 199.2 LBS | TEMPERATURE: 97.8 F | DIASTOLIC BLOOD PRESSURE: 66 MMHG | HEART RATE: 85 BPM | BODY MASS INDEX: 34.01 KG/M2 | OXYGEN SATURATION: 96 %

## 2020-05-29 DIAGNOSIS — R18.8 CIRRHOSIS OF LIVER WITH ASCITES, UNSPECIFIED HEPATIC CIRRHOSIS TYPE (HCC): Primary | ICD-10-CM

## 2020-05-29 DIAGNOSIS — K74.60 CIRRHOSIS OF LIVER WITH ASCITES, UNSPECIFIED HEPATIC CIRRHOSIS TYPE (HCC): Primary | ICD-10-CM

## 2020-05-29 LAB
BACTERIA SPEC BFLD CULT: NO GROWTH
GRAM STN SPEC: NORMAL
GRAM STN SPEC: NORMAL

## 2020-05-29 PROCEDURE — 99239 HOSP IP/OBS DSCHRG MGMT >30: CPT | Performed by: INTERNAL MEDICINE

## 2020-05-29 PROCEDURE — 99232 SBSQ HOSP IP/OBS MODERATE 35: CPT | Performed by: INTERNAL MEDICINE

## 2020-05-29 PROCEDURE — 0W9G3ZZ DRAINAGE OF PERITONEAL CAVITY, PERCUTANEOUS APPROACH: ICD-10-PCS | Performed by: INTERNAL MEDICINE

## 2020-05-29 RX ORDER — FUROSEMIDE 40 MG/1
40 TABLET ORAL 2 TIMES DAILY
Qty: 60 TABLET | Refills: 0 | Status: SHIPPED | OUTPATIENT
Start: 2020-05-29 | End: 2020-06-29 | Stop reason: SDUPTHER

## 2020-05-29 RX ORDER — ALBUMIN (HUMAN) 12.5 G/50ML
12.5 SOLUTION INTRAVENOUS ONCE
Status: COMPLETED | OUTPATIENT
Start: 2020-05-29 | End: 2020-05-29

## 2020-05-29 RX ORDER — SPIRONOLACTONE 100 MG/1
100 TABLET, FILM COATED ORAL DAILY
Qty: 30 TABLET | Refills: 0 | Status: SHIPPED | OUTPATIENT
Start: 2020-05-30 | End: 2020-06-29 | Stop reason: SDUPTHER

## 2020-05-29 RX ORDER — ACETAMINOPHEN 325 MG/1
650 TABLET ORAL EVERY 8 HOURS PRN
Qty: 30 TABLET | Refills: 0 | Status: SHIPPED | OUTPATIENT
Start: 2020-05-29

## 2020-05-29 RX ADMIN — SPIRONOLACTONE 100 MG: 25 TABLET ORAL at 08:26

## 2020-05-29 RX ADMIN — FUROSEMIDE 40 MG: 40 TABLET ORAL at 08:26

## 2020-05-29 RX ADMIN — FUROSEMIDE 40 MG: 40 TABLET ORAL at 18:18

## 2020-05-29 RX ADMIN — METHADONE HYDROCHLORIDE 100 MG: 10 TABLET ORAL at 08:26

## 2020-05-29 RX ADMIN — ALBUMIN (HUMAN) 12.5 G: 0.25 INJECTION, SOLUTION INTRAVENOUS at 18:18

## 2020-05-29 NOTE — ASSESSMENT & PLAN NOTE
I do not think that the patient has an active COVID-19 infection, likely has residual positive testing from recent infection      Maintain isolation inpatient

## 2020-05-29 NOTE — PLAN OF CARE
Problem: PAIN - ADULT  Goal: Verbalizes/displays adequate comfort level or baseline comfort level  Description  Interventions:  - Encourage patient to monitor pain and request assistance  - Assess pain using appropriate pain scale  - Administer analgesics based on type and severity of pain and evaluate response  - Implement non-pharmacological measures as appropriate and evaluate response  - Consider cultural and social influences on pain and pain management  - Notify physician/advanced practitioner if interventions unsuccessful or patient reports new pain  Outcome: Progressing     Problem: INFECTION - ADULT  Goal: Absence or prevention of progression during hospitalization  Description  INTERVENTIONS:  - Assess and monitor for signs and symptoms of infection  - Monitor lab/diagnostic results  - Monitor all insertion sites, i e  indwelling lines, tubes, and drains  - Monitor endotracheal if appropriate and nasal secretions for changes in amount and color  - [place] appropriate cooling/warming therapies per order  - Administer medications as ordered  - Instruct and encourage patient and family to use good hand hygiene technique  - Identify and instruct in appropriate isolation precautions for identified infection/condition  Outcome: Progressing  Goal: Absence of fever/infection during neutropenic period  Description  INTERVENTIONS:  - Monitor WBC    Outcome: Progressing     Problem: SAFETY ADULT  Goal: Patient will remain free of falls  Description  INTERVENTIONS:  - Assess patient frequently for physical needs  -  Identify cognitive and physical deficits and behaviors that affect risk of falls    -  [place] fall precautions as indicated by assessment   - Educate patient/family on patient safety including physical limitations  - Instruct patient to call for assistance with activity based on assessment  - Modify environment to reduce risk of injury  - Consider OT/PT consult to assist with strengthening/mobility  Outcome: Progressing  Goal: Maintain or return to baseline ADL function  Description  INTERVENTIONS:  -  Assess patient's ability to carry out ADLs; assess patient's baseline for ADL function and identify physical deficits which impact ability to perform ADLs (bathing, care of mouth/teeth, toileting, grooming, dressing, etc )  - Assess/evaluate cause of self-care deficits   - Assess range of motion  - Assess patient's mobility; develop plan if impaired  - Assess patient's need for assistive devices and provide as appropriate  - Encourage maximum independence but intervene and supervise when necessary  - Involve family in performance of ADLs  - Assess for home care needs following discharge   - Consider OT consult to assist with ADL evaluation and planning for discharge  - Provide patient education as appropriate  Outcome: Progressing  Goal: Maintain or return mobility status to optimal level  Description  INTERVENTIONS:  - Assess patient's baseline mobility status (ambulation, transfers, stairs, etc )    - Identify cognitive and physical deficits and behaviors that affect mobility  - Identify mobility aids required to assist with transfers and/or ambulation (gait belt, sit-to-stand, lift, walker, cane, etc )  - [place] fall precautions as indicated by assessment  - Record patient progress and toleration of activity level on Mobility SBAR; progress patient to next Phase/Stage  - Instruct patient to call for assistance with activity based on assessment  - Consider rehabilitation consult to assist with strengthening/weightbearing, etc   Outcome: Progressing     Problem: DISCHARGE PLANNING  Goal: Discharge to home or other facility with appropriate resources  Description  INTERVENTIONS:  - Identify barriers to discharge w/patient and caregiver  - Arrange for needed discharge resources and transportation as appropriate  - Identify discharge learning needs (meds, wound care, etc )  - Arrange for interpretive services to assist at discharge as needed  - Refer to Case Management Department for coordinating discharge planning if the patient needs post-hospital services based on physician/advanced practitioner order or complex needs related to functional status, cognitive ability, or social support system  Outcome: Progressing     Problem: Knowledge Deficit  Goal: Patient/family/caregiver demonstrates understanding of disease process, treatment plan, medications, and discharge instructions  Description  Complete learning assessment and assess knowledge base    Interventions:  - Provide teaching at level of understanding  - Provide teaching via preferred learning methods  Outcome: Progressing     Problem: RESPIRATORY - ADULT  Goal: Achieves optimal ventilation and oxygenation  Description  INTERVENTIONS:  - Assess for changes in respiratory status  - Assess for changes in mentation and behavior  - Position to facilitate oxygenation and minimize respiratory effort  - Oxygen administered by appropriate delivery if ordered  - Initiate smoking cessation education as indicated  - Encourage broncho-pulmonary hygiene including cough, deep breathe, Incentive Spirometry  - Assess the need for suctioning and aspirate as needed  - Assess and instruct to report SOB or any respiratory difficulty  - Respiratory Therapy support as indicated  Outcome: Progressing     Problem: METABOLIC, FLUID AND ELECTROLYTES - ADULT  Goal: Electrolytes maintained within normal limits  Description  INTERVENTIONS:  - Monitor labs and assess patient for signs and symptoms of electrolyte imbalances  - Administer electrolyte replacement as ordered  - Monitor response to electrolyte replacements, including repeat lab results as appropriate  - Instruct patient on fluid and nutrition as appropriate  Outcome: Progressing  Goal: Fluid balance maintained  Description  INTERVENTIONS:  - Monitor labs   - Monitor I/O and WT  - Instruct patient on fluid and nutrition as appropriate  - Assess for signs & symptoms of volume excess or deficit  Outcome: Progressing     Problem: Prexisting or High Potential for Compromised Skin Integrity  Goal: Skin integrity is maintained or improved  Description  INTERVENTIONS:  - Identify patients at risk for skin breakdown  - Assess and monitor skin integrity  - Assess and monitor nutrition and hydration status  - Monitor labs   - Assess for incontinence   - Turn and reposition patient  - Assist with mobility/ambulation  - Relieve pressure over bony prominences  - Avoid friction and shearing  - Provide appropriate hygiene as needed including keeping skin clean and dry  - Evaluate need for skin moisturizer/barrier cream  - Collaborate with interdisciplinary team   - Patient/family teaching  - Consider wound care consult   Outcome: Progressing     Problem: Potential for Falls  Goal: Patient will remain free of falls  Description  INTERVENTIONS:  - Assess patient frequently for physical needs  -  Identify cognitive and physical deficits and behaviors that affect risk of falls    -  [place] fall precautions as indicated by assessment   - Educate patient/family on patient safety including physical limitations  - Instruct patient to call for assistance with activity based on assessment  - Modify environment to reduce risk of injury  - Consider OT/PT consult to assist with strengthening/mobility  Outcome: Progressing

## 2020-05-29 NOTE — DISCHARGE SUMMARY
Discharge- Amanda Dent 1970, 54 y o  male MRN: [id number]    Unit/Bed#: -01 Encounter: [id number]    Primary Care Provider: No primary care provider on file  Date and time admitted to hospital: 5/26/2020 12:08 PM        * Cirrhosis of liver with ascites [facility])  Assessment & Plan  New diagnosis of liver disease  Patient denies any active alcohol use, has not had any significant alcohol intake in more than 20 years  hep C cirrhosis, GI appreciated some serologies are still pending  Case discussed with Gastroenterology    Dc on lasix 40mg BID, aldactone 100mg daily    COVID-19  Assessment & Plan  I do not think that the patient has an active COVID-19 infection, likely has residual positive testing from recent infection  Maintain isolation inpatient      Pancytopenia [facility])  Assessment & Plan  Pancytopenia, present on admission, likely secondary to underlying cirrhosis  Methadone maintenance therapy patient [facility])  Assessment & Plan  Continue methadone          Discharging Physician / Practitioner: Mikal Juarez DO  PCP: No primary care provider on file  Admission Date:   Admission Orders (From admission, onward)     Ordered        05/26/20 1819  Inpatient Admission  Once         05/26/20 1601  Place in Observation (expected length of stay for this patient is less than two midnights)  Once                   Discharge Date: 05/29/20    Resolved Problems  Date Reviewed: 5/29/2020    None          Consultations During Hospital Stay:  · GI    Procedures Performed:   · Paracentesis x2    Significant Findings / Test Results:   · Hep C cirrhosis      Reason for Admission:  Abdominal swelling    Hospital Course:     Amanda Dent is a 54 y o  male patient who originally presented to the hospital on 5/26/2020 due to HPI per admitting history and physical     Please see daily progress notes for detailed plan of care  Per GI  "   ASSESSMENT and PLAN     1   Cirrhosis of liver with ascites [facility]) - this is a new diagnosis and new decompensation with ascites  MELD 14  H/o IVDA and ETOH (over 10 yrs ago)  +HCV ANTIBODY  Paracentesis 5/26 removed 7 8L and negative for SBP  - continue diuresis with Lasix 40mg BID, continue Aldactone 100mg daily  - Strict I&O's  - Daily weights while inpatient - recommend using one TYPE of scale (ideally standing scale)  - Low sodium (less than 2 g daily) diet, rec nutrition consult  - Fluid restriction  - Positive for Hep C Ab, awaiting GENOTYPE and VIRAL LOAD  Treatment will be outpatient  Also sent the remainder of cirrhosis work up while inpatient including AFP to avoid lab exposure to [place] + patient   - CT negative for [address]  5/26/2020, no evidence of PVT  - Eventually needs EGD for varices screening  - Emphasized the importance of outpatient follow up for possible liver transplant evaluation  "      Please see above list of diagnoses and related plan for additional information  Condition at Discharge: stable     Discharge Day Visit / Exam:     Subjective:  No pain  Vitals: Blood Pressure: 122/80 (05/29/20 0824)  Pulse: 80 (05/29/20 1300)  Temperature: 97 8 °F (36 6 °C) (05/29/20 0824)  Temp Source: Oral (05/28/20 0900)  Respirations: 19 (05/29/20 0824)  Height: 5' 4" (162 6 cm) (05/28/20 1000)  Weight - Scale: 90 4 kg (199 lb 3 2 oz) (05/29/20 0543)  SpO2: 96 % (05/29/20 1300)  Exam:   Physical Exam   Constitutional: No distress  Cardiovascular: Normal rate  Pulmonary/Chest: Effort normal    Abdominal: Soft  He exhibits distension  There is no tenderness  There is no guarding  Skin: He is not diaphoretic  Nursing note and vitals reviewed  Discharge instructions/Information to patient and family:   See after visit summary for information provided to patient and family  Provisions for Follow-Up Care:  See after visit summary for information related to follow-up care and any pertinent home health orders        Disposition:     Home    For Discharges to [facility] SNF:   · Not Applicable to this Patient - Not Applicable to this Patient    Planned Readmission: no     Discharge Statement:  I spent 35 minutes discharging the patient  This time was spent on the day of discharge  I had direct contact with the patient on the day of discharge  Greater than 50% of the total time was spent examining patient, answering all patient questions, arranging and discussing plan of care with patient as well as directly providing post-discharge instructions  Additional time then spent on discharge activities  Discharge Medications:  See after visit summary for reconciled discharge medications provided to patient and family        ** Please Note: This note has been constructed using a voice recognition system **

## 2020-05-29 NOTE — NURSING NOTE
Patient's IV was removed  Reviewed discharge instructions with patient  Patient instructed to wear mask out of facility, compliant  Father providing transport home  Left ambulatory, escorted by staff

## 2020-05-29 NOTE — PROGRESS NOTES
[facility]  [id number]    54 y o   male      ASSESSMENT and PLAN    1  Cirrhosis of liver with ascites ([facility] [address] ) - this is a new diagnosis and new decompensation with ascites  MELD 14  H/o IVDA and ETOH (over 10 yrs ago)  +HCV ANTIBODY  Paracentesis 5/26 removed 7 8L and negative for SBP  - continue diuresis with Lasix 40mg BID, continue Aldactone 100mg daily  - Strict I&O's  - Daily weights while inpatient - recommend using one TYPE of scale (ideally standing scale)  - Low sodium (less than 2 g daily) diet, rec nutrition consult  - Fluid restriction  - Positive for Hep C Ab, awaiting GENOTYPE and VIRAL LOAD  Treatment will be outpatient  Also sent the remainder of cirrhosis work up while inpatient including AFP to avoid lab exposure to [place] + patient   - CT negative for [address]  5/26/2020, no evidence of PVT  - Eventually needs EGD for varices screening  - Emphasized the importance of outpatient follow up for possible liver transplant evaluation       2  H/o IVDA - now on methadone     3  H/o ETOH abuse - no use for over 20 years     4  COVID+ - he states he was sick w [place] a month ago, was cleared to go back to work 2 weeks ago, but testing at adm still positive and pt in isolation currently  No F/C/CP/SOB       5  Pancytopenia- WBC slowly improving  May be 2/2 recent COVID infection, will need to be monitored carefully  Also likely due to cirrhosis given the thrombocytopenia  Chief Complaint   Patient presents with   [facility]     pt reports bloating that he noticed about a month ago  states that it is now in his legs and going down into his scrotum  states he "never goes to the doctor"        SUBJECTIVE/HPI   discussed with nurse  Still distended and uncomfortable  Tolerating p o  No dyspnea  Diuresing      /80   Pulse 66   Temp 97 8 °F (36 6 °C)   Resp 19   Ht 5' 4" (1 626 m)   Wt 90 4 kg (199 lb 3 2 oz)   SpO2 98%   BMI 34 19 kg/m²     PHYSICALEXAM  General appearance: alert, appears comfortable  Head: Normocephalic, without obvious abnormality, atraumatic  Abdomen:  Mildly distended ascites  Extremities: extremities normal, atraumatic, no cyanosis or edema  Neurologic: Grossly normal    Lab Results   Component Value Date    CALCIUM 7 6 (L) 05/28/2020    K 4 5 05/28/2020    CO2 32 05/28/2020     05/28/2020    BUN 14 05/28/2020    CREATININE 0 69 05/28/2020     Lab Results   Component Value Date    WBC 2 16 (L) 05/28/2020    HGB 11 2 (L) 05/28/2020    HCT 34 3 (L) 05/28/2020    MCV 92 05/28/2020    PLT 42 (LL) 05/28/2020     Lab Results   Component Value Date    ALT 25 05/28/2020    AST 42 05/28/2020    ALKPHOS 55 05/28/2020     No results found for: AMYLASE  Lab Results   Component Value Date    LIPASE 188 05/26/2020     Lab Results   Component Value Date    IRON 48 (L) 05/27/2020    TIBC 187 (L) 05/27/2020    FERRITIN 161 05/27/2020     Lab Results   Component Value Date    INR 1 58 (H) 05/28/2020       Counseling / Coordination of Care  Total floor / unit time spent today 15 minutes

## 2020-05-29 NOTE — PROCEDURES
Bedside Paracentesis performed using US guidance, about 7 liters Emily fluid removed without complication      Dr Ana Cristina Britton present for procedure

## 2020-05-29 NOTE — PLAN OF CARE
Problem: PAIN - ADULT  Goal: Verbalizes/displays adequate comfort level or baseline comfort level  Description  Interventions:  - Encourage patient to monitor pain and request assistance  - Assess pain using appropriate pain scale  - Administer analgesics based on type and severity of pain and evaluate response  - Implement non-pharmacological measures as appropriate and evaluate response  - Consider cultural and social influences on pain and pain management  - Notify physician/advanced practitioner if interventions unsuccessful or patient reports new pain  Outcome: Progressing     Problem: INFECTION - ADULT  Goal: Absence or prevention of progression during hospitalization  Description  INTERVENTIONS:  - Assess and monitor for signs and symptoms of infection  - Monitor lab/diagnostic results  - Monitor all insertion sites, i e  indwelling lines, tubes, and drains  - Monitor endotracheal if appropriate and nasal secretions for changes in amount and color  - [place] appropriate cooling/warming therapies per order  - Administer medications as ordered  - Instruct and encourage patient and family to use good hand hygiene technique  - Identify and instruct in appropriate isolation precautions for identified infection/condition  Outcome: Progressing     Problem: INFECTION - ADULT  Goal: Absence of fever/infection during neutropenic period  Description  INTERVENTIONS:  - Monitor WBC    Outcome: Progressing     Problem: SAFETY ADULT  Goal: Patient will remain free of falls  Description  INTERVENTIONS:  - Assess patient frequently for physical needs  -  Identify cognitive and physical deficits and behaviors that affect risk of falls    -  [place] fall precautions as indicated by assessment   - Educate patient/family on patient safety including physical limitations  - Instruct patient to call for assistance with activity based on assessment  - Modify environment to reduce risk of injury  - Consider OT/PT consult to assist with strengthening/mobility  Outcome: Progressing     Problem: SAFETY ADULT  Goal: Maintain or return to baseline ADL function  Description  INTERVENTIONS:  -  Assess patient's ability to carry out ADLs; assess patient's baseline for ADL function and identify physical deficits which impact ability to perform ADLs (bathing, care of mouth/teeth, toileting, grooming, dressing, etc )  - Assess/evaluate cause of self-care deficits   - Assess range of motion  - Assess patient's mobility; develop plan if impaired  - Assess patient's need for assistive devices and provide as appropriate  - Encourage maximum independence but intervene and supervise when necessary  - Involve family in performance of ADLs  - Assess for home care needs following discharge   - Consider OT consult to assist with ADL evaluation and planning for discharge  - Provide patient education as appropriate  Outcome: Progressing     Problem: SAFETY ADULT  Goal: Maintain or return mobility status to optimal level  Description  INTERVENTIONS:  - Assess patient's baseline mobility status (ambulation, transfers, stairs, etc )    - Identify cognitive and physical deficits and behaviors that affect mobility  - Identify mobility aids required to assist with transfers and/or ambulation (gait belt, sit-to-stand, lift, walker, cane, etc )  - [place] fall precautions as indicated by assessment  - Record patient progress and toleration of activity level on Mobility SBAR; progress patient to next Phase/Stage  - Instruct patient to call for assistance with activity based on assessment  - Consider rehabilitation consult to assist with strengthening/weightbearing, etc   Outcome: Progressing     Problem: DISCHARGE PLANNING  Goal: Discharge to home or other facility with appropriate resources  Description  INTERVENTIONS:  - Identify barriers to discharge w/patient and caregiver  - Arrange for needed discharge resources and transportation as appropriate  - Identify discharge learning needs (meds, wound care, etc )  - Arrange for interpretive services to assist at discharge as needed  - Refer to Case Management Department for coordinating discharge planning if the patient needs post-hospital services based on physician/advanced practitioner order or complex needs related to functional status, cognitive ability, or social support system  Outcome: Progressing     Problem: Knowledge Deficit  Goal: Patient/family/caregiver demonstrates understanding of disease process, treatment plan, medications, and discharge instructions  Description  Complete learning assessment and assess knowledge base    Interventions:  - Provide teaching at level of understanding  - Provide teaching via preferred learning methods  Outcome: Progressing     Problem: RESPIRATORY - ADULT  Goal: Achieves optimal ventilation and oxygenation  Description  INTERVENTIONS:  - Assess for changes in respiratory status  - Assess for changes in mentation and behavior  - Position to facilitate oxygenation and minimize respiratory effort  - Oxygen administered by appropriate delivery if ordered  - Initiate smoking cessation education as indicated  - Encourage broncho-pulmonary hygiene including cough, deep breathe, Incentive Spirometry  - Assess the need for suctioning and aspirate as needed  - Assess and instruct to report SOB or any respiratory difficulty  - Respiratory Therapy support as indicated  Outcome: Progressing

## 2020-05-29 NOTE — ASSESSMENT & PLAN NOTE
New diagnosis of liver disease  Patient denies any active alcohol use, has not had any significant alcohol intake in more than 20 years       hep C cirrhosis, GI appreciated some serologies are still pending  Case discussed with Gastroenterology    Dc on lasix 40mg BID, aldactone 100mg daily

## 2020-05-31 LAB
BACTERIA BLD CULT: NORMAL
BACTERIA BLD CULT: NORMAL

## 2020-06-02 LAB
A2 MACROGLOB SERPL-MCNC: 194 MG/DL (ref 110–276)
ALT SERPL W P-5'-P-CCNC: 27 IU/L (ref 0–55)
APO A-I SERPL-MCNC: 81 MG/DL (ref 101–178)
BILIRUB SERPL-MCNC: 1.2 MG/DL (ref 0–1.2)
COMMENT: ABNORMAL
FIBROSIS SCORING:: ABNORMAL
FIBROSIS STAGE SERPL QL: ABNORMAL
GGT SERPL-CCNC: 16 IU/L (ref 0–65)
HAPTOGLOB SERPL-MCNC: <10 MG/DL (ref 23–355)
HCV RNA SERPL NAA+PROBE-ACNC: NORMAL IU/ML
HCV RNA SERPL NAA+PROBE-LOG IU: 5.53 LOG10 IU/ML
INTERPRETATIONS: ABNORMAL
LIVER FIBR SCORE SERPL CALC.FIBROSURE: 0.78 (ref 0–0.21)
NECROINFLAMM ACTIVITY SCORING:: ABNORMAL
NECROINFLAMMATORY ACT GRADE SERPL QL: ABNORMAL
NECROINFLAMMATORY ACT SCORE SERPL: 0.23 (ref 0–0.17)
SERVICE CMNT-IMP: ABNORMAL
TEST INFORMATION: NORMAL

## 2020-06-02 NOTE — RESULT ENCOUNTER NOTE
Hey, saw this patient is on [facility]'s outpatient follow up schedule  Known cirrhotic  However, patient was just admitted for [facility]  Although we did think he was recovering from the infection, he should be RETESTED PRIOR TO coming in for an office visit, or we can change the visit to a virtual visit

## 2020-06-03 ENCOUNTER — HOSPITAL ENCOUNTER (OUTPATIENT)
Dept: INTERVENTIONAL RADIOLOGY/VASCULAR | Facility: HOSPITAL | Age: 50
Discharge: HOME | End: 2020-06-03
Attending: INTERNAL MEDICINE | Admitting: RADIOLOGY
Payer: COMMERCIAL

## 2020-06-03 VITALS
RESPIRATION RATE: 18 BRPM | OXYGEN SATURATION: 98 % | HEART RATE: 76 BPM | DIASTOLIC BLOOD PRESSURE: 73 MMHG | SYSTOLIC BLOOD PRESSURE: 120 MMHG

## 2020-06-03 DIAGNOSIS — K74.60 CIRRHOSIS OF LIVER WITH ASCITES, UNSPECIFIED HEPATIC CIRRHOSIS TYPE (HCC): ICD-10-CM

## 2020-06-03 DIAGNOSIS — R18.8 CIRRHOSIS OF LIVER WITH ASCITES, UNSPECIFIED HEPATIC CIRRHOSIS TYPE (HCC): ICD-10-CM

## 2020-06-03 LAB — HFE GENE MUT ANL BLD/T: NORMAL

## 2020-06-03 PROCEDURE — 49083 ABD PARACENTESIS W/IMAGING: CPT | Performed by: RADIOLOGY

## 2020-06-03 PROCEDURE — 49083 ABD PARACENTESIS W/IMAGING: CPT

## 2020-06-03 RX ORDER — ALBUMIN (HUMAN) 12.5 G/50ML
SOLUTION INTRAVENOUS
Status: COMPLETED | OUTPATIENT
Start: 2020-06-03 | End: 2020-06-03

## 2020-06-03 RX ADMIN — ALBUMIN (HUMAN) 25 G: 0.25 INJECTION, SOLUTION INTRAVENOUS at 11:38

## 2020-06-04 ENCOUNTER — TELEMEDICINE (OUTPATIENT)
Dept: GASTROENTEROLOGY | Facility: CLINIC | Age: 50
End: 2020-06-04
Payer: COMMERCIAL

## 2020-06-04 VITALS — HEIGHT: 64 IN | WEIGHT: 190 LBS | BODY MASS INDEX: 32.44 KG/M2

## 2020-06-04 DIAGNOSIS — K59.03 DRUG-INDUCED CONSTIPATION: ICD-10-CM

## 2020-06-04 DIAGNOSIS — F11.20 METHADONE MAINTENANCE THERAPY PATIENT (HCC): Chronic | ICD-10-CM

## 2020-06-04 DIAGNOSIS — R18.8 CIRRHOSIS OF LIVER WITH ASCITES, UNSPECIFIED HEPATIC CIRRHOSIS TYPE (HCC): Primary | ICD-10-CM

## 2020-06-04 DIAGNOSIS — B18.2 CHRONIC HEPATITIS C WITHOUT HEPATIC COMA (HCC): ICD-10-CM

## 2020-06-04 DIAGNOSIS — U07.1 COVID-19: ICD-10-CM

## 2020-06-04 DIAGNOSIS — K74.60 CIRRHOSIS OF LIVER WITH ASCITES, UNSPECIFIED HEPATIC CIRRHOSIS TYPE (HCC): Primary | ICD-10-CM

## 2020-06-04 DIAGNOSIS — R89.4 ABNORMAL CELIAC ANTIBODY PANEL: ICD-10-CM

## 2020-06-04 LAB
HCV GENTYP SERPL NAA+PROBE: NORMAL
HCV PLEASE NOTE: NORMAL

## 2020-06-04 PROCEDURE — 99214 OFFICE O/P EST MOD 30 MIN: CPT | Performed by: NURSE PRACTITIONER

## 2020-06-04 RX ORDER — DOCUSATE SODIUM 100 MG/1
100 CAPSULE, LIQUID FILLED ORAL DAILY
COMMUNITY
End: 2020-07-16 | Stop reason: ALTCHOICE

## 2020-06-09 ENCOUNTER — HOSPITAL ENCOUNTER (OUTPATIENT)
Dept: INTERVENTIONAL RADIOLOGY/VASCULAR | Facility: HOSPITAL | Age: 50
Discharge: HOME | End: 2020-06-09
Attending: INTERNAL MEDICINE | Admitting: RADIOLOGY
Payer: COMMERCIAL

## 2020-06-09 VITALS — SYSTOLIC BLOOD PRESSURE: 115 MMHG | OXYGEN SATURATION: 98 % | DIASTOLIC BLOOD PRESSURE: 63 MMHG | HEART RATE: 69 BPM

## 2020-06-09 DIAGNOSIS — R18.8 ASCITES: ICD-10-CM

## 2020-06-09 PROCEDURE — 49083 ABD PARACENTESIS W/IMAGING: CPT

## 2020-06-09 PROCEDURE — 49083 ABD PARACENTESIS W/IMAGING: CPT | Performed by: RADIOLOGY

## 2020-06-10 ENCOUNTER — OFFICE VISIT (OUTPATIENT)
Dept: URGENT CARE | Facility: CLINIC | Age: 50
End: 2020-06-10
Payer: COMMERCIAL

## 2020-06-10 VITALS
DIASTOLIC BLOOD PRESSURE: 64 MMHG | HEIGHT: 64 IN | TEMPERATURE: 98.6 F | SYSTOLIC BLOOD PRESSURE: 120 MMHG | BODY MASS INDEX: 25.44 KG/M2 | OXYGEN SATURATION: 97 % | HEART RATE: 78 BPM | RESPIRATION RATE: 18 BRPM | WEIGHT: 149 LBS

## 2020-06-10 DIAGNOSIS — Z20.822 COVID-19 RULED OUT: Primary | ICD-10-CM

## 2020-06-10 PROCEDURE — 99213 OFFICE O/P EST LOW 20 MIN: CPT | Performed by: PHYSICIAN ASSISTANT

## 2020-06-10 PROCEDURE — U0003 INFECTIOUS AGENT DETECTION BY NUCLEIC ACID (DNA OR RNA); SEVERE ACUTE RESPIRATORY SYNDROME CORONAVIRUS 2 (SARS-COV-2) (CORONAVIRUS DISEASE [COVID-19]), AMPLIFIED PROBE TECHNIQUE, MAKING USE OF HIGH THROUGHPUT TECHNOLOGIES AS DESCRIBED BY CMS-2020-01-R: HCPCS | Performed by: PHYSICIAN ASSISTANT

## 2020-06-13 ENCOUNTER — TELEPHONE (OUTPATIENT)
Dept: URGENT CARE | Facility: CLINIC | Age: 50
End: 2020-06-13

## 2020-06-13 LAB — SARS-COV-2 RNA SPEC QL NAA+PROBE: NOT DETECTED

## 2020-06-29 DIAGNOSIS — K74.60 CIRRHOSIS OF LIVER WITH ASCITES, UNSPECIFIED HEPATIC CIRRHOSIS TYPE (HCC): ICD-10-CM

## 2020-06-29 DIAGNOSIS — R18.8 CIRRHOSIS OF LIVER WITH ASCITES, UNSPECIFIED HEPATIC CIRRHOSIS TYPE (HCC): ICD-10-CM

## 2020-06-29 RX ORDER — SPIRONOLACTONE 100 MG/1
100 TABLET, FILM COATED ORAL DAILY
Qty: 30 TABLET | Refills: 1 | Status: SHIPPED | OUTPATIENT
Start: 2020-06-29 | End: 2020-08-17 | Stop reason: SDUPTHER

## 2020-06-29 RX ORDER — FUROSEMIDE 40 MG/1
40 TABLET ORAL 2 TIMES DAILY
Qty: 60 TABLET | Refills: 1 | Status: SHIPPED | OUTPATIENT
Start: 2020-06-29 | End: 2020-08-17 | Stop reason: SDUPTHER

## 2020-06-30 ENCOUNTER — TRANSCRIBE ORDERS (OUTPATIENT)
Dept: ADMINISTRATIVE | Facility: HOSPITAL | Age: 50
End: 2020-06-30

## 2020-06-30 ENCOUNTER — HOSPITAL ENCOUNTER (OUTPATIENT)
Dept: ULTRASOUND IMAGING | Facility: HOSPITAL | Age: 50
Discharge: HOME | End: 2020-06-30
Payer: COMMERCIAL

## 2020-06-30 ENCOUNTER — TELEPHONE (OUTPATIENT)
Dept: GASTROENTEROLOGY | Facility: CLINIC | Age: 50
End: 2020-06-30

## 2020-06-30 ENCOUNTER — APPOINTMENT (OUTPATIENT)
Dept: LAB | Facility: HOSPITAL | Age: 50
End: 2020-06-30
Payer: COMMERCIAL

## 2020-06-30 DIAGNOSIS — R18.8 CIRRHOSIS OF LIVER WITH ASCITES, UNSPECIFIED HEPATIC CIRRHOSIS TYPE (HCC): ICD-10-CM

## 2020-06-30 DIAGNOSIS — B18.2 CHRONIC HEPATITIS C WITHOUT HEPATIC COMA (HCC): ICD-10-CM

## 2020-06-30 DIAGNOSIS — K74.60 CIRRHOSIS OF LIVER WITH ASCITES, UNSPECIFIED HEPATIC CIRRHOSIS TYPE (HCC): ICD-10-CM

## 2020-06-30 LAB
ALBUMIN SERPL BCP-MCNC: 2.5 G/DL (ref 3.5–5)
ALP SERPL-CCNC: 65 U/L (ref 46–116)
ALT SERPL W P-5'-P-CCNC: 45 U/L (ref 12–78)
ANION GAP SERPL CALCULATED.3IONS-SCNC: 2 MMOL/L (ref 4–13)
AST SERPL W P-5'-P-CCNC: 59 U/L (ref 5–45)
BILIRUB SERPL-MCNC: 1.4 MG/DL (ref 0.2–1)
BUN SERPL-MCNC: 12 MG/DL (ref 5–25)
CALCIUM SERPL-MCNC: 8.1 MG/DL (ref 8.3–10.1)
CHLORIDE SERPL-SCNC: 104 MMOL/L (ref 100–108)
CO2 SERPL-SCNC: 33 MMOL/L (ref 21–32)
CREAT SERPL-MCNC: 0.76 MG/DL (ref 0.6–1.3)
ERYTHROCYTE [DISTWIDTH] IN BLOOD BY AUTOMATED COUNT: 15.9 % (ref 11.6–15.1)
GFR SERPL CREATININE-BSD FRML MDRD: 109 ML/MIN/1.73SQ M
GLUCOSE P FAST SERPL-MCNC: 87 MG/DL (ref 65–99)
HCT VFR BLD AUTO: 34.2 % (ref 36.5–49.3)
HGB BLD-MCNC: 11.4 G/DL (ref 12–17)
MCH RBC QN AUTO: 30.9 PG (ref 26.8–34.3)
MCHC RBC AUTO-ENTMCNC: 33.3 G/DL (ref 31.4–37.4)
MCV RBC AUTO: 93 FL (ref 82–98)
PLATELET # BLD AUTO: 44 THOUSANDS/UL (ref 149–390)
PMV BLD AUTO: 10.3 FL (ref 8.9–12.7)
POTASSIUM SERPL-SCNC: 4.4 MMOL/L (ref 3.5–5.3)
PROT SERPL-MCNC: 6.9 G/DL (ref 6.4–8.2)
RBC # BLD AUTO: 3.69 MILLION/UL (ref 3.88–5.62)
SODIUM SERPL-SCNC: 139 MMOL/L (ref 136–145)
WBC # BLD AUTO: 1.67 THOUSAND/UL (ref 4.31–10.16)

## 2020-06-30 PROCEDURE — 80053 COMPREHEN METABOLIC PANEL: CPT

## 2020-06-30 PROCEDURE — 36415 COLL VENOUS BLD VENIPUNCTURE: CPT

## 2020-06-30 PROCEDURE — 76705 ECHO EXAM OF ABDOMEN: CPT

## 2020-06-30 PROCEDURE — 85027 COMPLETE CBC AUTOMATED: CPT

## 2020-07-16 ENCOUNTER — TELEPHONE (OUTPATIENT)
Dept: GASTROENTEROLOGY | Facility: CLINIC | Age: 50
End: 2020-07-16

## 2020-07-16 ENCOUNTER — TELEMEDICINE (OUTPATIENT)
Dept: GASTROENTEROLOGY | Facility: CLINIC | Age: 50
End: 2020-07-16
Payer: COMMERCIAL

## 2020-07-16 VITALS — HEIGHT: 65 IN | WEIGHT: 151 LBS | BODY MASS INDEX: 25.16 KG/M2

## 2020-07-16 DIAGNOSIS — K74.60 CIRRHOSIS OF LIVER WITH ASCITES, UNSPECIFIED HEPATIC CIRRHOSIS TYPE (HCC): Primary | ICD-10-CM

## 2020-07-16 DIAGNOSIS — U07.1 COVID-19: ICD-10-CM

## 2020-07-16 DIAGNOSIS — R18.8 CIRRHOSIS OF LIVER WITH ASCITES, UNSPECIFIED HEPATIC CIRRHOSIS TYPE (HCC): Primary | ICD-10-CM

## 2020-07-16 DIAGNOSIS — R89.4 ABNORMAL CELIAC ANTIBODY PANEL: ICD-10-CM

## 2020-07-16 DIAGNOSIS — K59.03 DRUG-INDUCED CONSTIPATION: ICD-10-CM

## 2020-07-16 DIAGNOSIS — B18.2 CHRONIC HEPATITIS C WITHOUT HEPATIC COMA (HCC): ICD-10-CM

## 2020-07-16 DIAGNOSIS — F11.20 METHADONE MAINTENANCE THERAPY PATIENT (HCC): Chronic | ICD-10-CM

## 2020-07-16 PROCEDURE — 99214 OFFICE O/P EST MOD 30 MIN: CPT | Performed by: NURSE PRACTITIONER

## 2020-07-16 NOTE — H&P (VIEW-ONLY)
Virtual Regular Visit      Assessment/Plan:    1  Cirrhosis of liver with ascites, unspecified hepatic cirrhosis type ([facility] [address] )  Newly diagnosed in May, decompensated with ascites, thrombocytopenia and mild coagulopathy  Has some lower extremity edema which is improved with diuretics  History of IVDA and EtOH  Clean and sober since 2010  +HCV antibody  Was getting weekly paracentesis for large volume  Most recent 6/9 x 3 6 L  Ultrasound 6/30 showed splenomegaly, cirrhosis and portal hypertension with prominent caliber portal vein, small volume RUQ ascites  Most recent LFTs essentially normal, slight AST elevation at 59  T bili remains 1 4  Renal function remains normal   INR 1 58  Platelet count 44  AFP was normal at 1 9 in May  Following low-sodium diet and fluid restriction  Taking furosemide and spironolactone without missing doses  MELD=13      -advised to continue low-sodium diet (2 g)  -advised to continue 2 L daily fluid restriction  -continue furosemide 40 mg b i d   -continue spironolactone 100 mg daily with dinner  -will schedule EGD-[facility] to evaluate for varices  -continue abstinence from all alcohol and drugs  -will refer to hepatology, transplant program for evaluation    2  Chronic hepatitis C without hepatic coma (HCC)  Likely acquired through previous IVDA, possibly from tattoos  Genotype 1A  Viral load 337,370  Fibrosis score F4/A0 C/W cirrhosis  Treatment naive      -will start treatment for hepatitis C (likely [address])  -will consult with hepatology regarding appropriateness of treatment verses transplant  -follow-up per hepatitis C protocol  -discussed with Sylwia Shore, RN    3  Methadone maintenance therapy patient [facility])  Maintains sobriety on methadone maintenance program  4   Abnormal celiac antibody panel  Elevated IgA on celiac panel in May    Will need confirmatory biopsy     -schedule for EGD-[facility] with duodenal biopsy  -if biopsy +, will need to follow a gluten free diet    5  COVID-19   Had active COVID-19 infection in June  Most recent test 6/11 was negative  6  Drug-induced constipation  Resolved  Having 1-2 soft formed stools daily without stool softener which he discontinued recently  Problem List Items Addressed This Visit        Digestive    Cirrhosis of liver with ascites ([facility] [address] ) - Primary    Chronic hepatitis C without hepatic coma ([facility] [address] )    Drug-induced constipation       Other    [facility] (Chronic)           Reason for visit is to follow up his cirrhosis, hepatitis-C, constipation, abnormal celiac panel and COVID-19  Chief Complaint   Patient presents with    Follow-up     Cirrhosis    Virtual Regular Visit        Encounter provider MARIN Hernandez    Provider located at [address] [id number] [phone]      Recent Visits  No visits were found meeting these conditions  Showing recent visits within past 7 days and meeting all other requirements     Today's Visits  Date Type Provider Dept   07/16/20 Telemedicine MARIN Escobar [facility]   Showing today's visits and meeting all other requirements     Future Appointments  No visits were found meeting these conditions  Showing future appointments within next 150 days and meeting all other requirements        The patient was identified by name and date of birth  Debra Orona was informed that this is a telemedicine visit and that the visit is being conducted through [employer] and patient was informed that this is not a secure, HIPAA-complaint platform  He agrees to proceed     My office door was closed  No one else was in the room  He acknowledged consent and understanding of privacy and security of the video platform   The patient has agreed to participate and understands they can discontinue the visit at any time     Patient is aware this is a billable service  Subjective  Li Hays is a 54 y o  male who was evaluated today in follow-up for his cirrhosis, hepatitis-C, abnormal celiac panel, constipation and recent COVID-19 infection  Recently tested negative for COVID-19 and is feeling better  Denies increased fatigue, fevers or chills, nausea or vomiting, jaundice, pruritus, confusion  No longer constipated  He is having 1-2 soft formed stools daily without straining  He discontinued his daily stool softener couple weeks ago  Denies melena, hematochezia  He further denies any UGI or alarm symptoms  He does note some lower extremity swelling but self reports marked improvement with diuretics  His abdomen feels soft and denies fullness, bloating  Appetite is normal   Weight is stable  He is following sodium and fluid restrictions,  taking furosemide and spironolactone daily without missing doses      HPI     Past Medical History:   Diagnosis Date    Ascites     Cirrhosis [facility])        Past Surgical History:   Procedure Laterality Date    IR PARACENTESIS  5/26/2020    IR PARACENTESIS  6/3/2020    IR PARACENTESIS  6/9/2020    PARACENTESIS  5/29/2020          Social History     Socioeconomic History    Marital status: Single     Spouse name: Not on file    Number of children: Not on file    Years of education: Not on file    Highest education level: Not on file   Occupational History    Not on file   Social Needs    Financial resource strain: Not on file    Food insecurity:     Worry: Not on file     Inability: Not on file    Transportation needs:     Medical: Not on file     Non-medical: Not on file   Tobacco Use    Smoking status: Never Smoker    Smokeless tobacco: Never Used   Substance and Sexual Activity    Alcohol use: Not Currently     Frequency: Never     Binge frequency: Never    Drug use: Not Currently     Comment: methadone for 20 years     Sexual activity: Not on file Lifestyle    Physical activity:     Days per week: Not on file     Minutes per session: Not on file    Stress: Not on file   Relationships    Social connections:     Talks on phone: Not on file     Gets together: Not on file     Attends Rastafari service: Not on file     Active member of club or organization: Not on file     Attends meetings of clubs or organizations: Not on file     Relationship status: Not on file    Intimate partner violence:     Fear of current or ex partner: Not on file     Emotionally abused: Not on file     Physically abused: Not on file     Forced sexual activity: Not on file   Other Topics Concern    Not on file   Social History Narrative    Not on file     Family History   Problem Relation Age of Onset    Cirrhosis Maternal Grandmother         alcoholic cirrhosis    Colon polyps Neg Hx     Colon cancer Neg Hx        Current Outpatient Medications   Medication Sig Dispense Refill    acetaminophen (TYLENOL) 325 mg tablet Take 2 tablets (650 mg total) by mouth every 8 (eight) hours as needed for mild pain 30 tablet 0    furosemide (LASIX) 40 mg tablet Take 1 tablet (40 mg total) by mouth 2 (two) times a day 60 tablet 1    methadone (DOLOPHINE) 10 mg tablet Take 100 mg by mouth daily Indications: Opioid Dependence,      spironolactone (ALDACTONE) 100 mg tablet Take 1 tablet (100 mg total) by mouth daily 30 tablet 1     No current facility-administered medications for this visit  No Known Allergies    Review of Systems     ROS:  All systems were reviewed and positives noted as per HPI  All other systems were reported as negative  Video Exam    Vitals:    07/16/20 1321   Weight: 68 5 kg (151 lb)   Height: 5' 4 5" (1 638 m)       Physical Exam     General Appearance: NAD, cooperative, alert  Head:  Normocephalic, atraumatic  Eyes: Anicteric, PERRLA, EOMI  ENT:  Normal external appearance, normal mucosa      Neck:  symmetrical, trachea midline  Resp:  respirations unlabored   GI: the patient appreciated no abdominal tenderness with self palpation within my view and the abdomen appeared nondistended,, soft  Rectal: Deferred  Extremities:  Trace-1+ bilateral pretibial edema seen from ankles to knees, no other edema  Musculoskeletal:  Normal ROM  Skin:     No jaundice, rashes, or lesions noted to visible areas, multiple tattoos    Psych: Normal affect, good eye contact  Neuro: No gross deficits, AAOx3, no asterixis    I spent 15 minutes directly with the patient during this visit      Candi Granger acknowledges that he has consented to an online visit or consultation  He understands that the online visit is based solely on information provided by him, and that, in the absence of a face-to-face physical evaluation by the physician, the diagnosis he receives is both limited and provisional in terms of accuracy and completeness  This is not intended to replace a full medical face-to-face evaluation by the physician  Ambrose Gabriel understands and accepts these terms

## 2020-07-16 NOTE — PROGRESS NOTES
Virtual Regular Visit      Assessment/Plan:    1  Cirrhosis of liver with ascites, unspecified hepatic cirrhosis type ([facility] [address] )  Newly diagnosed in May, decompensated with ascites, thrombocytopenia and mild coagulopathy  Has some lower extremity edema which is improved with diuretics  History of IVDA and EtOH  Clean and sober since 2010  +HCV antibody  Was getting weekly paracentesis for large volume  Most recent 6/9 x 3 6 L  Ultrasound 6/30 showed splenomegaly, cirrhosis and portal hypertension with prominent caliber portal vein, small volume RUQ ascites  Most recent LFTs essentially normal, slight AST elevation at 59  T bili remains 1 4  Renal function remains normal   INR 1 58  Platelet count 44  AFP was normal at 1 9 in May  Following low-sodium diet and fluid restriction  Taking furosemide and spironolactone without missing doses  MELD=13      -advised to continue low-sodium diet (2 g)  -advised to continue 2 L daily fluid restriction  -continue furosemide 40 mg b i d   -continue spironolactone 100 mg daily with dinner  -will schedule EGD-[facility] to evaluate for varices  -continue abstinence from all alcohol and drugs  -will refer to hepatology, transplant program for evaluation    2  Chronic hepatitis C without hepatic coma (HCC)  Likely acquired through previous IVDA, possibly from tattoos  Genotype 1A  Viral load 337,370  Fibrosis score F4/A0 C/W cirrhosis  Treatment naive      -will start treatment for hepatitis C (likely [address])  -will consult with hepatology regarding appropriateness of treatment verses transplant  -follow-up per hepatitis C protocol  -discussed with Lona Pena, RN    3  Methadone maintenance therapy patient [facility])  Maintains sobriety on methadone maintenance program  4   Abnormal celiac antibody panel  Elevated IgA on celiac panel in May    Will need confirmatory biopsy     -schedule for EGD-[facility] with duodenal biopsy  -if biopsy +, will need to follow a gluten free diet    5  COVID-19   Had active COVID-19 infection in June  Most recent test 6/11 was negative  6  Drug-induced constipation  Resolved  Having 1-2 soft formed stools daily without stool softener which he discontinued recently  Problem List Items Addressed This Visit        Digestive    Cirrhosis of liver with ascites ([facility] [address] ) - Primary    Chronic hepatitis C without hepatic coma ([facility] [address] )    Drug-induced constipation       Other    M[facility]ad[facility] (Chronic)           Reason for visit is to follow up his cirrhosis, hepatitis-C, constipation, abnormal celiac panel and COVID-19  Chief Complaint   Patient presents with    Follow-up     Cirrhosis    Virtual Regular Visit        Encounter provider MARIN Manley    Provider located at [address] [address] [phone]      Recent Visits  No visits were found meeting these conditions  Showing recent visits within past 7 days and meeting all other requirements     Today's Visits  Date Type Provider Dept   07/16/20 Telemedicine MARIN Escobar [facility]   Showing today's visits and meeting all other requirements     Future Appointments  No visits were found meeting these conditions  Showing future appointments within next 150 days and meeting all other requirements        The patient was identified by name and date of birth  Dustin Tohrpe was informed that this is a telemedicine visit and that the visit is being conducted through [employer] and patient was informed that this is not a secure, HIPAA-complaint platform  He agrees to proceed     My office door was closed  No one else was in the room  He acknowledged consent and understanding of privacy and security of the video platform   The patient has agreed to participate and understands they can discontinue the visit at any time     Patient is aware this is a billable service  Subjective  Fan Brown is a 54 y o  male who was evaluated today in follow-up for his cirrhosis, hepatitis-C, abnormal celiac panel, constipation and recent COVID-19 infection  Recently tested negative for COVID-19 and is feeling better  Denies increased fatigue, fevers or chills, nausea or vomiting, jaundice, pruritus, confusion  No longer constipated  He is having 1-2 soft formed stools daily without straining  He discontinued his daily stool softener couple weeks ago  Denies melena, hematochezia  He further denies any UGI or alarm symptoms  He does note some lower extremity swelling but self reports marked improvement with diuretics  His abdomen feels soft and denies fullness, bloating  Appetite is normal   Weight is stable  He is following sodium and fluid restrictions,  taking furosemide and spironolactone daily without missing doses      HPI     Past Medical History:   Diagnosis Date    Ascites     Cirrhosis [facility])        Past Surgical History:   Procedure Laterality Date    IR PARACENTESIS  5/26/2020    IR PARACENTESIS  6/3/2020    IR PARACENTESIS  6/9/2020    PARACENTESIS  5/29/2020          Social History     Socioeconomic History    Marital status: Single     Spouse name: Not on file    Number of children: Not on file    Years of education: Not on file    Highest education level: Not on file   Occupational History    Not on file   Social Needs    Financial resource strain: Not on file    Food insecurity:     Worry: Not on file     Inability: Not on file    Transportation needs:     Medical: Not on file     Non-medical: Not on file   Tobacco Use    Smoking status: Never Smoker    Smokeless tobacco: Never Used   Substance and Sexual Activity    Alcohol use: Not Currently     Frequency: Never     Binge frequency: Never    Drug use: Not Currently     Comment: methadone for 20 years     Sexual activity: Not on file Lifestyle    Physical activity:     Days per week: Not on file     Minutes per session: Not on file    Stress: Not on file   Relationships    Social connections:     Talks on phone: Not on file     Gets together: Not on file     Attends Orthodoxy service: Not on file     Active member of club or organization: Not on file     Attends meetings of clubs or organizations: Not on file     Relationship status: Not on file    Intimate partner violence:     Fear of current or ex partner: Not on file     Emotionally abused: Not on file     Physically abused: Not on file     Forced sexual activity: Not on file   Other Topics Concern    Not on file   Social History Narrative    Not on file     Family History   Problem Relation Age of Onset    Cirrhosis Maternal Grandmother         alcoholic cirrhosis    Colon polyps Neg Hx     Colon cancer Neg Hx        Current Outpatient Medications   Medication Sig Dispense Refill    acetaminophen (TYLENOL) 325 mg tablet Take 2 tablets (650 mg total) by mouth every 8 (eight) hours as needed for mild pain 30 tablet 0    furosemide (LASIX) 40 mg tablet Take 1 tablet (40 mg total) by mouth 2 (two) times a day 60 tablet 1    methadone (DOLOPHINE) 10 mg tablet Take 100 mg by mouth daily Indications: Opioid Dependence,      spironolactone (ALDACTONE) 100 mg tablet Take 1 tablet (100 mg total) by mouth daily 30 tablet 1     No current facility-administered medications for this visit  No Known Allergies    Review of Systems     ROS:  All systems were reviewed and positives noted as per HPI  All other systems were reported as negative  Video Exam    Vitals:    07/16/20 1321   Weight: 68 5 kg (151 lb)   Height: 5' 4 5" (1 638 m)       Physical Exam     General Appearance: NAD, cooperative, alert  Head:  Normocephalic, atraumatic  Eyes: Anicteric, PERRLA, EOMI  ENT:  Normal external appearance, normal mucosa      Neck:  symmetrical, trachea midline  Resp:  respirations unlabored   GI: the patient appreciated no abdominal tenderness with self palpation within my view and the abdomen appeared nondistended,, soft  Rectal: Deferred  Extremities:  Trace-1+ bilateral pretibial edema seen from ankles to knees, no other edema  Musculoskeletal:  Normal ROM  Skin:     No jaundice, rashes, or lesions noted to visible areas, multiple tattoos    Psych: Normal affect, good eye contact  Neuro: No gross deficits, AAOx3, no asterixis    I spent 15 minutes directly with the patient during this visit      Candi Elkin acknowledges that he has consented to an online visit or consultation  He understands that the online visit is based solely on information provided by him, and that, in the absence of a face-to-face physical evaluation by the physician, the diagnosis he receives is both limited and provisional in terms of accuracy and completeness  This is not intended to replace a full medical face-to-face evaluation by the physician  Sharon Rose understands and accepts these terms

## 2020-07-16 NOTE — PATIENT INSTRUCTIONS
Continue the low-salt diet and try to stick to 2 L or less fluid per day  Continue to take your furosemide in the morning and spironolactone with dinner  Our nurse Jay Pate will get back to you regarding the hepatitis C treatment  Continue with your methadone program and no alcohol

## 2020-07-17 ENCOUNTER — TELEPHONE (OUTPATIENT)
Dept: GASTROENTEROLOGY | Facility: CLINIC | Age: 50
End: 2020-07-17

## 2020-07-17 NOTE — TELEPHONE ENCOUNTER
Referral for [facility] Liver Evaluation placed,demographics insuranceProgress Notes, Labs/Imaging to [facility] fax #[phone]

## 2020-07-17 NOTE — TELEPHONE ENCOUNTER
Can you please place a referral to [facility] hepatology, Dr Orin Goncalves and colleagues for evaluation? Has hepatitis C and cirrhosis from alcohol, clean and sober since 2010  Okay to treat hepatitis C? Set up for transplant instead? Can be please evaluate and make recommendations prior to hep C treatment      Thanks HPR

## 2020-07-20 DIAGNOSIS — Z11.59 SCREENING FOR VIRAL DISEASE: ICD-10-CM

## 2020-07-20 PROCEDURE — U0003 INFECTIOUS AGENT DETECTION BY NUCLEIC ACID (DNA OR RNA); SEVERE ACUTE RESPIRATORY SYNDROME CORONAVIRUS 2 (SARS-COV-2) (CORONAVIRUS DISEASE [COVID-19]), AMPLIFIED PROBE TECHNIQUE, MAKING USE OF HIGH THROUGHPUT TECHNOLOGIES AS DESCRIBED BY CMS-2020-01-R: HCPCS

## 2020-07-21 LAB
INPATIENT: NORMAL
SARS-COV-2 RNA SPEC QL NAA+PROBE: NOT DETECTED

## 2020-07-23 ENCOUNTER — TELEPHONE (OUTPATIENT)
Dept: GASTROENTEROLOGY | Facility: CLINIC | Age: 50
End: 2020-07-23

## 2020-07-23 ENCOUNTER — CLINICAL SUPPORT (OUTPATIENT)
Dept: GASTROENTEROLOGY | Facility: CLINIC | Age: 50
End: 2020-07-23

## 2020-07-23 VITALS — HEIGHT: 64 IN | BODY MASS INDEX: 25.78 KG/M2 | WEIGHT: 151 LBS

## 2020-07-23 DIAGNOSIS — R18.8 CIRRHOSIS OF LIVER WITH ASCITES, UNSPECIFIED HEPATIC CIRRHOSIS TYPE (HCC): Primary | ICD-10-CM

## 2020-07-23 DIAGNOSIS — K74.60 CIRRHOSIS OF LIVER WITH ASCITES, UNSPECIFIED HEPATIC CIRRHOSIS TYPE (HCC): Primary | ICD-10-CM

## 2020-07-23 NOTE — TELEPHONE ENCOUNTER
Patient requested to speak with me regarding Hepatitis C Treatment  I advised that [facility] team will be reaching out to him to schedule Liver Evaluation  We are holding off on Hep C treatment until after his [facility] evaluation pending on the recommendations  I reached out to [facility] to follow up re: scheduling

## 2020-07-24 NOTE — TELEPHONE ENCOUNTER
Update from [place]; they have financial clearance to proceed with testing for Mr Grajeda Net  They will start working on his case

## 2020-07-24 NOTE — TELEPHONE ENCOUNTER
I called and clarified everything with him  We did discuss the [facility] transplant program but after Dr Matthew Bird my note, related that this patient is a perfect candidate for transplant with his 10 year sobriety and compliance however; sometimes the transplant programs do not want to treat the hepatitis C prior to transplant  He advised that we refer to the program and let them make that determination prior to initiating his hep C treatment  The patient understands all this and appreciated the updated information  They have already been in touch with him in our ranging a virtual visit in the very near future      Thanks HPR

## 2020-07-27 NOTE — PRE-PROCEDURE INSTRUCTIONS
Pre-Surgery Instructions:   Medication Instructions    acetaminophen (TYLENOL) 325 mg tablet Patient was instructed by Physician and understands   furosemide (LASIX) 40 mg tablet Patient was instructed by Physician and understands   METHADONE HCL PO Patient was instructed by Physician and understands   spironolactone (ALDACTONE) 100 mg tablet Patient was instructed by Physician and understands  Follow prep as per physician  You will receive a call with your time to arrive at the hospital   Secure transportation, you will be having anesthesia

## 2020-07-28 ENCOUNTER — ANESTHESIA EVENT (OUTPATIENT)
Dept: GASTROENTEROLOGY | Facility: HOSPITAL | Age: 50
End: 2020-07-28

## 2020-07-29 ENCOUNTER — ANESTHESIA (OUTPATIENT)
Dept: GASTROENTEROLOGY | Facility: HOSPITAL | Age: 50
End: 2020-07-29

## 2020-07-29 ENCOUNTER — HOSPITAL ENCOUNTER (OUTPATIENT)
Dept: GASTROENTEROLOGY | Facility: HOSPITAL | Age: 50
Setting detail: OUTPATIENT SURGERY
Discharge: HOME | End: 2020-07-29
Attending: INTERNAL MEDICINE | Admitting: INTERNAL MEDICINE
Payer: COMMERCIAL

## 2020-07-29 VITALS
SYSTOLIC BLOOD PRESSURE: 114 MMHG | RESPIRATION RATE: 20 BRPM | BODY MASS INDEX: 25.61 KG/M2 | WEIGHT: 150 LBS | OXYGEN SATURATION: 100 % | TEMPERATURE: 98 F | DIASTOLIC BLOOD PRESSURE: 65 MMHG | HEART RATE: 59 BPM | HEIGHT: 64 IN

## 2020-07-29 DIAGNOSIS — K74.60 CIRRHOSIS OF LIVER WITH ASCITES, UNSPECIFIED HEPATIC CIRRHOSIS TYPE (HCC): ICD-10-CM

## 2020-07-29 DIAGNOSIS — R89.4 ABNORMAL CELIAC ANTIBODY PANEL: ICD-10-CM

## 2020-07-29 DIAGNOSIS — R18.8 CIRRHOSIS OF LIVER WITH ASCITES, UNSPECIFIED HEPATIC CIRRHOSIS TYPE (HCC): ICD-10-CM

## 2020-07-29 RX ORDER — PROPOFOL 10 MG/ML
INJECTION, EMULSION INTRAVENOUS AS NEEDED
Status: DISCONTINUED | OUTPATIENT
Start: 2020-07-29 | End: 2020-07-29 | Stop reason: SURG

## 2020-07-29 RX ORDER — SODIUM CHLORIDE 9 MG/ML
75 INJECTION, SOLUTION INTRAVENOUS CONTINUOUS
Status: DISCONTINUED | OUTPATIENT
Start: 2020-07-29 | End: 2020-08-02 | Stop reason: HOSPADM

## 2020-07-29 RX ADMIN — PROPOFOL 120 MG: 10 INJECTION, EMULSION INTRAVENOUS at 10:24

## 2020-07-29 RX ADMIN — SODIUM CHLORIDE 75 ML/HR: 0.9 INJECTION, SOLUTION INTRAVENOUS at 09:49

## 2020-07-29 NOTE — ANESTHESIA POSTPROCEDURE EVALUATION
Post-Op Assessment Note    CV Status:  Stable  Pain Score: 0    Pain management: adequate     Mental Status:  Sleepy   Hydration Status:  Stable   PONV Controlled:  None   Post Op Vitals Reviewed: Yes      Staff: Anesthesiologist           BP      Temp      Pulse     Resp      SpO2

## 2020-07-29 NOTE — ANESTHESIA PREPROCEDURE EVALUATION
Review of Systems/Medical History  Patient summary reviewed  Chart reviewed      Cardiovascular  Negative cardio ROS    Pulmonary  Negative pulmonary ROS        GI/Hepatic    Liver disease , coagulopathy from hepatic dysfunction and cirrhosis, Chronic liver disease, Hepatitis C,   Comment: + Ascites     Negative  ROS        Endo/Other  Negative endo/other ROS      GYN       Hematology  Negative hematology ROS      Musculoskeletal  Negative musculoskeletal ROS        Neurology  Negative neurology ROS      Psychology     Chronic opioid dependence Chronic pain,            Physical Exam    Airway    Mallampati score: I  TM Distance: >3 FB  Neck ROM: full     Dental   upper dentures,     Cardiovascular  Comment: Negative ROS, Cardiovascular exam normal    Pulmonary  Pulmonary exam normal     Other Findings        Anesthesia Plan  ASA Score- 3     Anesthesia Type- IV sedation with anesthesia with ASA Monitors  Additional Monitors:   Airway Plan:         Plan Factors-    Induction- intravenous  Postoperative Plan-     Informed Consent- Anesthetic plan and risks discussed with patient

## 2020-07-29 NOTE — INTERVAL H&P NOTE
H&P reviewed  After examining the patient I find no changes in the patients condition since the H&P had been written      Vitals:    07/29/20 0940   BP: 125/58   Pulse: 65   Resp: 16   Temp: 98 1 °F (36 7 °C)   SpO2: 100%

## 2020-08-17 ENCOUNTER — OFFICE VISIT (OUTPATIENT)
Dept: GASTROENTEROLOGY | Facility: CLINIC | Age: 50
End: 2020-08-17
Payer: COMMERCIAL

## 2020-08-17 VITALS
HEART RATE: 71 BPM | HEIGHT: 64 IN | WEIGHT: 147 LBS | DIASTOLIC BLOOD PRESSURE: 78 MMHG | BODY MASS INDEX: 25.1 KG/M2 | SYSTOLIC BLOOD PRESSURE: 128 MMHG | TEMPERATURE: 97.7 F

## 2020-08-17 DIAGNOSIS — R18.8 CIRRHOSIS OF LIVER WITH ASCITES, UNSPECIFIED HEPATIC CIRRHOSIS TYPE (HCC): Primary | ICD-10-CM

## 2020-08-17 DIAGNOSIS — K74.60 CIRRHOSIS OF LIVER WITH ASCITES, UNSPECIFIED HEPATIC CIRRHOSIS TYPE (HCC): Primary | ICD-10-CM

## 2020-08-17 DIAGNOSIS — B18.2 CHRONIC HEPATITIS C WITHOUT HEPATIC COMA (HCC): ICD-10-CM

## 2020-08-17 DIAGNOSIS — K59.03 DRUG-INDUCED CONSTIPATION: ICD-10-CM

## 2020-08-17 PROBLEM — R89.4 ABNORMAL CELIAC ANTIBODY PANEL: Status: RESOLVED | Noted: 2020-06-04 | Resolved: 2020-08-17

## 2020-08-17 PROCEDURE — 99214 OFFICE O/P EST MOD 30 MIN: CPT | Performed by: NURSE PRACTITIONER

## 2020-08-17 RX ORDER — FUROSEMIDE 40 MG/1
40 TABLET ORAL 2 TIMES DAILY
Qty: 60 TABLET | Refills: 6 | Status: SHIPPED | OUTPATIENT
Start: 2020-08-17 | End: 2021-03-23 | Stop reason: SDUPTHER

## 2020-08-17 RX ORDER — SPIRONOLACTONE 100 MG/1
100 TABLET, FILM COATED ORAL DAILY
Qty: 30 TABLET | Refills: 6 | Status: SHIPPED | OUTPATIENT
Start: 2020-08-17 | End: 2021-03-23 | Stop reason: SDUPTHER

## 2020-08-17 NOTE — PATIENT INSTRUCTIONS
Continue restricting your sodium to 2000 mg ( 2 g ) and fluids to 2000 mL ( 2 L) daily   continue to take your furosemide and spironolactone as ordered   someone from the [employer] transplant program will contact you for evaluation prior to treating your hepatitis C

## 2020-08-17 NOTE — PROGRESS NOTES
[address] [facility] - Outpatient Follow-up Note  Lm Durán 54 y o  male MRN: [id number]  Encounter: [id number]    ASSESSMENT AND PLAN:      1  Cirrhosis of liver with ascites, unspecified hepatic cirrhosis type [facility])  New diagnosis in May, 2020 and decompensated with ascites, thrombocytopenia, mild coagulopathy  Likely on the basis of previous IVDA and EtOH and + HCV  Clean and sober since 2010  Last large volume paracentesis in June x3 0 6 L  Ultrasound June, 2020 showed splenomegaly, cirrhosis and portal hypertension, prominent caliber portal vein, small volume RUQ ascites  Abdominal ascites and lower extremity edema significantly improved with diuretics  EGD 07/29/2020 did show erythema in the stomach C/W mild portal hypertensive gastropathy but no varices  Renal function remains normal with furosemide and spironolactone  - repeat routine labs in 3 months (October)  -advised to continue 2 g sodium and 2 L fluid restriction daily  -continue spironolactone (ALDACTONE) 100 mg tablet; Take 1 tablet (100 mg total) by mouth daily  Dispense: 30 tablet; Refill: 6  -continue furosemide (LASIX) 40 mg tablet; Take 1 tablet (40 mg total) by mouth 2 (two) times a day  Dispense: 60 tablet; Refill: 6  -advised to continue abstinence from all EtOH and drugs  -awaiting formal evaluation from [facility] hepatology program    2  Chronic hepatitis C without hepatic coma (HCC)  Likely acquired through previous IVDA, possibly from tattoos  Treatment naive  Genotype 1A  Viral load 330,000, 370  Fibrosis score F4/A0 C/W cirrhosis  -approved for [address] and will initiate if recommended by [facility] transplant program (previously discussed with Dr Sebastien Carrillo)    3  Drug-induced constipation  Remains on methadone maintenance program   Constipation resolved, having formed stools daily without straining        Followup Appointment:  3 months  ______________________________________________________________________    Chief Complaint   Patient presents with    Follow-up     post EGD     HPI:  79-year-old male patient returns to the office to follow up his cirrhosis, hepatitis C as well as review results of recent EGD  Occasional mild fatigue but otherwise feeling back to baseline  Denies fevers or chills, nausea or vomiting, jaundice, pruritus, confusion  Having daily formed stools without straining  Denies melena, hematochezia  Not having any abdominal pain  Still has some soft fullness in his abdomen but denies feeling full or bloated  No lower extremity edema  Following sodium and fluid restrictions, taking furosemide and spironolactone without missing doses  Appetite is normal   Weight is stable      Historical Information   Past Medical History:   Diagnosis Date    Ascites     Cirrhosis ([address] )     [place] teeth extracted      Past Surgical History:   Procedure Laterality Date    IR PARACENTESIS  5/26/2020    IR PARACENTESIS  6/3/2020    IR PARACENTESIS  6/9/2020    PARACENTESIS  5/29/2020          Social History     Substance and Sexual Activity   Alcohol Use Not Currently    Frequency: Never    Binge frequency: Never     Social History     Substance and Sexual Activity   Drug Use Not Currently    Comment: methadone for 20 years      Social History     Tobacco Use   Smoking Status Never Smoker   Smokeless Tobacco Never Used     Family History   Problem Relation Age of Onset    Cirrhosis Maternal Grandmother         alcoholic cirrhosis    Colon polyps Neg Hx     Colon cancer Neg Hx          Current Outpatient Medications:     acetaminophen (TYLENOL) 325 mg tablet    furosemide (LASIX) 40 mg tablet    Glecaprevir-Pibrentasvir 100-40 MG TABS    METHADONE HCL PO    spironolactone (ALDACTONE) 100 mg tablet  No Known Allergies  Reviewed medications and allergies and updated as indicated    ROS:  All systems were reviewed and positives noted as per HPI  All other systems were reported as negative  PHYSICAL EXAM:    Blood pressure 128/78, pulse 71, temperature 97 7 °F (36 5 °C), height 5' 4" (1 626 m), weight 66 7 kg (147 lb)  Body mass index is 25 23 kg/m²  General Appearance: NAD, cooperative, alert  Head:  Normocephalic, atraumatic  Eyes: Anicteric, PERRLA, EOMI  ENT:  Normal external appearance, normal mucosa  Neck:  Supple, symmetrical, trachea midline  Resp:  Clear to auscultation bilaterally; no rales, rhonchi or wheezing; respirations unlabored   CV:  S1 S2, Regular rate and rhythm; no murmur, rub, or gallop  GI:   very soft, round, non-tender, softly distended; normal bowel sounds; no masses, no organomegaly  Rectal: Deferred  Musculoskeletal: No cyanosis, clubbing or edema  Normal ROM  Skin:  No jaundice, rashes, or lesions   Heme/Lymph: No palpable cervical lymphadenopathy  Psych: Normal affect, good eye contact  Neuro: No gross deficits, AAOx3, no asterixis    Lab Results:   Lab Results   Component Value Date    WBC 1 67 (LL) 06/30/2020    HGB 11 4 (L) 06/30/2020    HCT 34 2 (L) 06/30/2020    MCV 93 06/30/2020    PLT 44 (LL) 06/30/2020     Lab Results   Component Value Date    K 4 4 06/30/2020     06/30/2020    CO2 33 (H) 06/30/2020    BUN 12 06/30/2020    CREATININE 0 76 06/30/2020    GLUF 87 06/30/2020    CALCIUM 8 1 (L) 06/30/2020    AST 59 (H) 06/30/2020    ALT 45 06/30/2020    ALKPHOS 65 06/30/2020    EGFR 109 06/30/2020     Lab Results   Component Value Date    IRON 48 (L) 05/27/2020    TIBC 187 (L) 05/27/2020    FERRITIN 161 05/27/2020     Lab Results   Component Value Date    LIPASE 188 05/26/2020       Radiology Results:   No results found

## 2020-08-17 NOTE — TELEPHONE ENCOUNTER
Pending [facility] Transplant evaluation, may or may not need treatment for Hepatitis C  He has a follow up with [facility] today

## 2020-08-18 ENCOUNTER — TRANSCRIBE ORDERS (OUTPATIENT)
Dept: ADMINISTRATIVE | Facility: HOSPITAL | Age: 50
End: 2020-08-18

## 2020-08-18 ENCOUNTER — TELEPHONE (OUTPATIENT)
Dept: GASTROENTEROLOGY | Facility: CLINIC | Age: 50
End: 2020-08-18

## 2020-08-18 DIAGNOSIS — Z94.9 ORGAN TRANSPLANT: Primary | ICD-10-CM

## 2020-08-18 NOTE — TELEPHONE ENCOUNTER
Pt states he had an appt yesterday w/ Liset Alejandra and is to have blood work before next appt  He rec'd a call from [address] questioning when orders are to be done for US and nuclear CT? Pt wasn't sure about these orders  # [phone]

## 2020-08-18 NOTE — TELEPHONE ENCOUNTER
Spoke with patient  Other testing was ordered by [facility]  Advised to proceed with testing

## 2020-08-25 ENCOUNTER — HOSPITAL ENCOUNTER (OUTPATIENT)
Dept: ULTRASOUND IMAGING | Facility: HOSPITAL | Age: 50
Discharge: HOME | End: 2020-08-25
Payer: COMMERCIAL

## 2020-08-25 ENCOUNTER — TRANSCRIBE ORDERS (OUTPATIENT)
Dept: ADMINISTRATIVE | Facility: HOSPITAL | Age: 50
End: 2020-08-25

## 2020-08-25 ENCOUNTER — HOSPITAL ENCOUNTER (OUTPATIENT)
Dept: RADIOLOGY | Facility: HOSPITAL | Age: 50
Discharge: HOME | End: 2020-08-25
Payer: COMMERCIAL

## 2020-08-25 DIAGNOSIS — Z94.9 ORGAN TRANSPLANT: Primary | ICD-10-CM

## 2020-08-25 DIAGNOSIS — Z94.9 ORGAN TRANSPLANT: ICD-10-CM

## 2020-08-25 PROCEDURE — 76700 US EXAM ABDOM COMPLETE: CPT

## 2020-08-25 PROCEDURE — 71046 X-RAY EXAM CHEST 2 VIEWS: CPT

## 2020-08-31 ENCOUNTER — APPOINTMENT (OUTPATIENT)
Dept: LAB | Facility: HOSPITAL | Age: 50
End: 2020-08-31
Payer: COMMERCIAL

## 2020-08-31 ENCOUNTER — HOSPITAL ENCOUNTER (OUTPATIENT)
Dept: CT IMAGING | Facility: HOSPITAL | Age: 50
Discharge: HOME | End: 2020-08-31
Payer: COMMERCIAL

## 2020-08-31 DIAGNOSIS — Z94.9 ORGAN TRANSPLANT: ICD-10-CM

## 2020-08-31 LAB
ABO GROUP BLD: NORMAL
AFP-TM SERPL-MCNC: 3.8 NG/ML (ref 0.5–8)
ALBUMIN SERPL BCP-MCNC: 2.4 G/DL (ref 3.5–5)
ALP SERPL-CCNC: 67 U/L (ref 46–116)
ALT SERPL W P-5'-P-CCNC: 267 U/L (ref 12–78)
AMPHETAMINES SERPL QL SCN: NEGATIVE
ANION GAP SERPL CALCULATED.3IONS-SCNC: 2 MMOL/L (ref 4–13)
APTT PPP: 38 SECONDS (ref 23–37)
AST SERPL W P-5'-P-CCNC: 268 U/L (ref 5–45)
BARBITURATES UR QL: NEGATIVE
BASOPHILS # BLD AUTO: 0.01 THOUSANDS/ΜL (ref 0–0.1)
BASOPHILS NFR BLD AUTO: 1 % (ref 0–1)
BENZODIAZ UR QL: NEGATIVE
BILIRUB SERPL-MCNC: 1.3 MG/DL (ref 0.2–1)
BILIRUB UR QL STRIP: NEGATIVE
BILIRUB UR QL STRIP: NEGATIVE
BUN SERPL-MCNC: 11 MG/DL (ref 5–25)
CALCIUM SERPL-MCNC: 7.6 MG/DL (ref 8.3–10.1)
CEA SERPL-MCNC: 1.1 NG/ML (ref 0–3)
CEA SERPL-MCNC: 1.1 NG/ML (ref 0–3)
CHLORIDE SERPL-SCNC: 101 MMOL/L (ref 100–108)
CHOLEST SERPL-MCNC: 116 MG/DL (ref 50–200)
CLARITY UR: CLEAR
CLARITY UR: CLEAR
CO2 SERPL-SCNC: 32 MMOL/L (ref 21–32)
COCAINE UR QL: NEGATIVE
COLOR UR: YELLOW
COLOR UR: YELLOW
CREAT SERPL-MCNC: 0.85 MG/DL (ref 0.6–1.3)
EOSINOPHIL # BLD AUTO: 0.03 THOUSAND/ΜL (ref 0–0.61)
EOSINOPHIL NFR BLD AUTO: 2 % (ref 0–6)
ERYTHROCYTE [DISTWIDTH] IN BLOOD BY AUTOMATED COUNT: 14.2 % (ref 11.6–15.1)
EST. AVERAGE GLUCOSE BLD GHB EST-MCNC: 82 MG/DL
ETHANOL SERPL-MCNC: <3 MG/DL (ref 0–3)
GFR SERPL CREATININE-BSD FRML MDRD: 104 ML/MIN/1.73SQ M
GLUCOSE SERPL-MCNC: 94 MG/DL (ref 65–140)
GLUCOSE UR STRIP-MCNC: NEGATIVE MG/DL
GLUCOSE UR STRIP-MCNC: NEGATIVE MG/DL
HAV AB SER QL IA: REACTIVE
HBA1C MFR BLD: 4.5 %
HBV CORE AB SER QL: NORMAL
HBV SURFACE AB SER-ACNC: <3.1 MIU/ML
HCT VFR BLD AUTO: 30.4 % (ref 36.5–49.3)
HDLC SERPL-MCNC: 72 MG/DL
HGB BLD-MCNC: 10.6 G/DL (ref 12–17)
HGB UR QL STRIP.AUTO: NEGATIVE
HGB UR QL STRIP.AUTO: NEGATIVE
IMM GRANULOCYTES # BLD AUTO: 0.01 THOUSAND/UL (ref 0–0.2)
IMM GRANULOCYTES NFR BLD AUTO: 1 % (ref 0–2)
INR PPP: 1.62 (ref 0.84–1.19)
KETONES UR STRIP-MCNC: NEGATIVE MG/DL
KETONES UR STRIP-MCNC: NEGATIVE MG/DL
LDLC SERPL CALC-MCNC: 34 MG/DL (ref 0–100)
LEUKOCYTE ESTERASE UR QL STRIP: NEGATIVE
LEUKOCYTE ESTERASE UR QL STRIP: NEGATIVE
LYMPHOCYTES # BLD AUTO: 0.31 THOUSANDS/ΜL (ref 0.6–4.47)
LYMPHOCYTES NFR BLD AUTO: 23 % (ref 14–44)
MAGNESIUM SERPL-MCNC: 1.7 MG/DL (ref 1.6–2.6)
MCH RBC QN AUTO: 31.6 PG (ref 26.8–34.3)
MCHC RBC AUTO-ENTMCNC: 34.9 G/DL (ref 31.4–37.4)
MCV RBC AUTO: 91 FL (ref 82–98)
METHADONE UR QL: POSITIVE
MONOCYTES # BLD AUTO: 0.16 THOUSAND/ΜL (ref 0.17–1.22)
MONOCYTES NFR BLD AUTO: 12 % (ref 4–12)
NEUTROPHILS # BLD AUTO: 0.86 THOUSANDS/ΜL (ref 1.85–7.62)
NEUTS SEG NFR BLD AUTO: 61 % (ref 43–75)
NITRITE UR QL STRIP: NEGATIVE
NITRITE UR QL STRIP: NEGATIVE
NONHDLC SERPL-MCNC: 44 MG/DL
NRBC BLD AUTO-RTO: 0 /100 WBCS
OPIATES UR QL SCN: NEGATIVE
OXYCODONE+OXYMORPHONE UR QL SCN: NEGATIVE
PCP UR QL: NEGATIVE
PH UR STRIP.AUTO: 8 [PH]
PH UR STRIP.AUTO: 8 [PH]
PHOSPHATE SERPL-MCNC: 4 MG/DL (ref 2.7–4.5)
PLATELET # BLD AUTO: 33 THOUSANDS/UL (ref 149–390)
PMV BLD AUTO: 10.7 FL (ref 8.9–12.7)
POTASSIUM SERPL-SCNC: 3.6 MMOL/L (ref 3.5–5.3)
PROT SERPL-MCNC: 6.9 G/DL (ref 6.4–8.2)
PROT UR STRIP-MCNC: NEGATIVE MG/DL
PROT UR STRIP-MCNC: NEGATIVE MG/DL
PROTHROMBIN TIME: 19.2 SECONDS (ref 11.6–14.5)
PSA SERPL-MCNC: <0.1 NG/ML (ref 0–4)
RBC # BLD AUTO: 3.35 MILLION/UL (ref 3.88–5.62)
RH BLD: NEGATIVE
RPR SER QL: NORMAL
RUBV IGG SERPL IA-ACNC: >175 IU/ML
SODIUM SERPL-SCNC: 135 MMOL/L (ref 136–145)
SP GR UR STRIP.AUTO: 1.01 (ref 1–1.03)
SP GR UR STRIP.AUTO: 1.01 (ref 1–1.03)
THC UR QL: NEGATIVE
TRIGL SERPL-MCNC: 52 MG/DL
UROBILINOGEN UR QL STRIP.AUTO: 1 E.U./DL
UROBILINOGEN UR QL STRIP.AUTO: 1 E.U./DL
WBC # BLD AUTO: 1.38 THOUSAND/UL (ref 4.31–10.16)

## 2020-08-31 PROCEDURE — 86765 RUBEOLA ANTIBODY: CPT

## 2020-08-31 PROCEDURE — 86708 HEPATITIS A ANTIBODY: CPT

## 2020-08-31 PROCEDURE — 36415 COLL VENOUS BLD VENIPUNCTURE: CPT

## 2020-08-31 PROCEDURE — 83735 ASSAY OF MAGNESIUM: CPT

## 2020-08-31 PROCEDURE — 86900 BLOOD TYPING SEROLOGIC ABO: CPT

## 2020-08-31 PROCEDURE — 87522 HEPATITIS C REVRS TRNSCRPJ: CPT

## 2020-08-31 PROCEDURE — 86706 HEP B SURFACE ANTIBODY: CPT

## 2020-08-31 PROCEDURE — 84153 ASSAY OF PSA TOTAL: CPT

## 2020-08-31 PROCEDURE — 86901 BLOOD TYPING SEROLOGIC RH(D): CPT

## 2020-08-31 PROCEDURE — G1004 CDSM NDSC: HCPCS

## 2020-08-31 PROCEDURE — 82784 ASSAY IGA/IGD/IGG/IGM EACH: CPT

## 2020-08-31 PROCEDURE — 86778 TOXOPLASMA ANTIBODY IGM: CPT

## 2020-08-31 PROCEDURE — 80320 DRUG SCREEN QUANTALCOHOLS: CPT

## 2020-08-31 PROCEDURE — 84100 ASSAY OF PHOSPHORUS: CPT

## 2020-08-31 PROCEDURE — 82378 CARCINOEMBRYONIC ANTIGEN: CPT

## 2020-08-31 PROCEDURE — 86480 TB TEST CELL IMMUN MEASURE: CPT

## 2020-08-31 PROCEDURE — 86777 TOXOPLASMA ANTIBODY: CPT

## 2020-08-31 PROCEDURE — 82105 ALPHA-FETOPROTEIN SERUM: CPT

## 2020-08-31 PROCEDURE — 86664 EPSTEIN-BARR NUCLEAR ANTIGEN: CPT

## 2020-08-31 PROCEDURE — 86762 RUBELLA ANTIBODY: CPT

## 2020-08-31 PROCEDURE — 86592 SYPHILIS TEST NON-TREP QUAL: CPT

## 2020-08-31 PROCEDURE — 81003 URINALYSIS AUTO W/O SCOPE: CPT | Performed by: PHYSICIAN ASSISTANT

## 2020-08-31 PROCEDURE — 80061 LIPID PANEL: CPT

## 2020-08-31 PROCEDURE — 87389 HIV-1 AG W/HIV-1&-2 AB AG IA: CPT

## 2020-08-31 PROCEDURE — 80053 COMPREHEN METABOLIC PANEL: CPT

## 2020-08-31 PROCEDURE — 86665 EPSTEIN-BARR CAPSID VCA: CPT

## 2020-08-31 PROCEDURE — 86301 IMMUNOASSAY TUMOR CA 19-9: CPT

## 2020-08-31 PROCEDURE — 80307 DRUG TEST PRSMV CHEM ANLYZR: CPT | Performed by: PHYSICIAN ASSISTANT

## 2020-08-31 PROCEDURE — 74177 CT ABD & PELVIS W/CONTRAST: CPT

## 2020-08-31 PROCEDURE — 86704 HEP B CORE ANTIBODY TOTAL: CPT

## 2020-08-31 PROCEDURE — 85730 THROMBOPLASTIN TIME PARTIAL: CPT

## 2020-08-31 PROCEDURE — 86787 VARICELLA-ZOSTER ANTIBODY: CPT

## 2020-08-31 PROCEDURE — 86735 MUMPS ANTIBODY: CPT

## 2020-08-31 PROCEDURE — 83036 HEMOGLOBIN GLYCOSYLATED A1C: CPT

## 2020-08-31 PROCEDURE — 85610 PROTHROMBIN TIME: CPT

## 2020-08-31 PROCEDURE — 83516 IMMUNOASSAY NONANTIBODY: CPT

## 2020-08-31 PROCEDURE — 86663 EPSTEIN-BARR ANTIBODY: CPT

## 2020-08-31 PROCEDURE — 85025 COMPLETE CBC W/AUTO DIFF WBC: CPT

## 2020-08-31 RX ADMIN — IOHEXOL 100 ML: 350 INJECTION, SOLUTION INTRAVENOUS at 10:37

## 2020-09-01 LAB
CANCER AG19-9 SERPL-ACNC: 29 U/ML (ref 0–35)
CLINICAL COMMENT: NORMAL
CLINICAL COMMENT: NORMAL
EBV NA IGG SER IA-ACNC: 271 U/ML (ref 0–17.9)
EBV VCA IGG SER IA-ACNC: 517 U/ML (ref 0–17.9)
EBV VCA IGM SER IA-ACNC: <36 U/ML (ref 0–35.9)
HIV 1+2 AB+HIV1 P24 AG SERPL QL IA: NORMAL
INTERPRETATION: ABNORMAL
MEV IGG SER QL: NORMAL
MUV IGG SER QL: NORMAL
T GONDII IGG SERPL IA-ACNC: <3 IU/ML (ref 0–7.1)
T GONDII IGG SERPL IA-ACNC: <3 IU/ML (ref 0–7.1)
T GONDII IGM SER IA-ACNC: <3 AU/ML (ref 0–7.9)
T GONDII IGM SER IA-ACNC: <3 AU/ML (ref 0–7.9)
VZV IGG SER IA-ACNC: NORMAL

## 2020-09-02 LAB
GAMMA INTERFERON BACKGROUND BLD IA-ACNC: 0.02 IU/ML
HCV RNA SERPL NAA+PROBE-ACNC: NORMAL IU/ML
HCV RNA SERPL NAA+PROBE-LOG IU: 5.94 LOG10 IU/ML
M TB IFN-G BLD-IMP: ABNORMAL
M TB IFN-G CD4+ BCKGRND COR BLD-ACNC: -0.01 IU/ML
M TB IFN-G CD4+ BCKGRND COR BLD-ACNC: 0 IU/ML
MEV IGM SER-ACNC: <0.91 ISR (ref 0–0.9)
MITOGEN IGNF BCKGRD COR BLD-ACNC: <0.1 IU/ML
TEST INFORMATION: NORMAL

## 2020-09-03 LAB
CMV DNA SERPL NAA+PROBE-ACNC: NEGATIVE IU/ML
CMV DNA SERPL NAA+PROBE-LOG IU: NORMAL LOG10 IU/ML

## 2020-09-08 ENCOUNTER — HOSPITAL ENCOUNTER (OUTPATIENT)
Dept: NON INVASIVE DIAGNOSTICS | Age: 50
Discharge: HOME | End: 2020-09-08
Payer: COMMERCIAL

## 2020-09-08 ENCOUNTER — TRANSCRIBE ORDERS (OUTPATIENT)
Dept: ADMINISTRATIVE | Facility: HOSPITAL | Age: 50
End: 2020-09-08

## 2020-09-08 DIAGNOSIS — Z94.9 ORGAN TRANSPLANT: ICD-10-CM

## 2020-09-08 DIAGNOSIS — Z94.9 UNSPECIFIED ORGAN OR TISSUE REPLACED BY TRANSPLANT: Primary | ICD-10-CM

## 2020-09-08 DIAGNOSIS — Z94.9 UNSPECIFIED ORGAN OR TISSUE REPLACED BY TRANSPLANT: ICD-10-CM

## 2020-09-08 LAB
ATRIAL RATE: 61 BPM
P AXIS: -16 DEGREES
PR INTERVAL: 124 MS
QRS AXIS: -21 DEGREES
QRSD INTERVAL: 98 MS
QT INTERVAL: 502 MS
QTC INTERVAL: 505 MS
T WAVE AXIS: 4 DEGREES
VENTRICULAR RATE: 61 BPM

## 2020-09-08 PROCEDURE — 93010 ELECTROCARDIOGRAM REPORT: CPT | Performed by: INTERNAL MEDICINE

## 2020-09-08 PROCEDURE — 78452 HT MUSCLE IMAGE SPECT MULT: CPT

## 2020-09-08 PROCEDURE — G1004 CDSM NDSC: HCPCS

## 2020-09-08 PROCEDURE — 93017 CV STRESS TEST TRACING ONLY: CPT

## 2020-09-08 PROCEDURE — 93306 TTE W/DOPPLER COMPLETE: CPT

## 2020-09-08 PROCEDURE — 93018 CV STRESS TEST I&R ONLY: CPT | Performed by: INTERNAL MEDICINE

## 2020-09-08 PROCEDURE — 93005 ELECTROCARDIOGRAM TRACING: CPT

## 2020-09-08 PROCEDURE — 93306 TTE W/DOPPLER COMPLETE: CPT | Performed by: INTERNAL MEDICINE

## 2020-09-08 PROCEDURE — A9502 TC99M TETROFOSMIN: HCPCS

## 2020-09-08 PROCEDURE — 78452 HT MUSCLE IMAGE SPECT MULT: CPT | Performed by: INTERNAL MEDICINE

## 2020-09-08 PROCEDURE — 93016 CV STRESS TEST SUPVJ ONLY: CPT | Performed by: INTERNAL MEDICINE

## 2020-09-08 RX ADMIN — REGADENOSON 0.4 MG: 0.08 INJECTION, SOLUTION INTRAVENOUS at 10:26

## 2020-09-09 LAB
CHEST PAIN STATEMENT: NORMAL
MAX DIASTOLIC BP: 64 MMHG
MAX HEART RATE: 90 BPM
MAX PREDICTED HEART RATE: 173 BPM
MAX. SYSTOLIC BP: 118 MMHG
PROTOCOL NAME: NORMAL
REASON FOR TERMINATION: NORMAL
TARGET HR FORMULA: NORMAL
TEST INDICATION: NORMAL
TIME IN EXERCISE PHASE: NORMAL

## 2020-10-20 ENCOUNTER — TELEPHONE (OUTPATIENT)
Dept: SURGICAL ONCOLOGY | Facility: CLINIC | Age: 50
End: 2020-10-20

## 2020-10-20 ENCOUNTER — TELEPHONE (OUTPATIENT)
Dept: GASTROENTEROLOGY | Facility: CLINIC | Age: 50
End: 2020-10-20

## 2020-10-30 ENCOUNTER — PREP FOR PROCEDURE (OUTPATIENT)
Dept: INTERVENTIONAL RADIOLOGY/VASCULAR | Facility: CLINIC | Age: 50
End: 2020-10-30

## 2020-10-30 ENCOUNTER — CONSULT (OUTPATIENT)
Dept: HEMATOLOGY ONCOLOGY | Facility: HOSPITAL | Age: 50
End: 2020-10-30
Payer: COMMERCIAL

## 2020-10-30 VITALS
OXYGEN SATURATION: 99 % | WEIGHT: 142.5 LBS | SYSTOLIC BLOOD PRESSURE: 102 MMHG | TEMPERATURE: 97.9 F | RESPIRATION RATE: 18 BRPM | HEART RATE: 73 BPM | HEIGHT: 64 IN | DIASTOLIC BLOOD PRESSURE: 72 MMHG | BODY MASS INDEX: 24.33 KG/M2

## 2020-10-30 DIAGNOSIS — B18.2 CHRONIC HEPATITIS C WITHOUT HEPATIC COMA (HCC): Primary | ICD-10-CM

## 2020-10-30 DIAGNOSIS — D61.818 PANCYTOPENIA (HCC): Primary | ICD-10-CM

## 2020-10-30 PROCEDURE — 99245 OFF/OP CONSLTJ NEW/EST HI 55: CPT | Performed by: INTERNAL MEDICINE

## 2020-10-30 RX ORDER — SODIUM CHLORIDE 9 MG/ML
75 INJECTION, SOLUTION INTRAVENOUS CONTINUOUS
Status: CANCELLED | OUTPATIENT
Start: 2020-10-30

## 2020-11-12 ENCOUNTER — TRANSCRIBE ORDERS (OUTPATIENT)
Dept: LAB | Facility: HOSPITAL | Age: 50
End: 2020-11-12

## 2020-11-12 ENCOUNTER — HOSPITAL ENCOUNTER (OUTPATIENT)
Dept: CT IMAGING | Facility: HOSPITAL | Age: 50
Discharge: HOME | End: 2020-11-12
Attending: STUDENT IN AN ORGANIZED HEALTH CARE EDUCATION/TRAINING PROGRAM
Payer: COMMERCIAL

## 2020-11-12 VITALS
RESPIRATION RATE: 16 BRPM | OXYGEN SATURATION: 95 % | TEMPERATURE: 98.2 F | DIASTOLIC BLOOD PRESSURE: 65 MMHG | SYSTOLIC BLOOD PRESSURE: 113 MMHG | HEART RATE: 71 BPM

## 2020-11-12 DIAGNOSIS — D61.818 PANCYTOPENIA (HCC): ICD-10-CM

## 2020-11-12 DIAGNOSIS — B18.2 CHRONIC HEPATITIS C WITHOUT HEPATIC COMA (HCC): ICD-10-CM

## 2020-11-12 DIAGNOSIS — D61.818 PANCYTOPENIA (HCC): Primary | ICD-10-CM

## 2020-11-12 PROCEDURE — 88313 SPECIAL STAINS GROUP 2: CPT | Performed by: PATHOLOGY

## 2020-11-12 PROCEDURE — 81450 HL NEO GSAP 5-50DNA/DNA&RNA: CPT

## 2020-11-12 PROCEDURE — 88342 IMHCHEM/IMCYTCHM 1ST ANTB: CPT | Performed by: PATHOLOGY

## 2020-11-12 PROCEDURE — 88311 DECALCIFY TISSUE: CPT | Performed by: PATHOLOGY

## 2020-11-12 PROCEDURE — 77012 CT SCAN FOR NEEDLE BIOPSY: CPT | Performed by: RADIOLOGY

## 2020-11-12 PROCEDURE — 85097 BONE MARROW INTERPRETATION: CPT | Performed by: PATHOLOGY

## 2020-11-12 PROCEDURE — 88305 TISSUE EXAM BY PATHOLOGIST: CPT | Performed by: PATHOLOGY

## 2020-11-12 PROCEDURE — 88185 FLOWCYTOMETRY/TC ADD-ON: CPT

## 2020-11-12 PROCEDURE — 88237 TISSUE CULTURE BONE MARROW: CPT

## 2020-11-12 PROCEDURE — 99152 MOD SED SAME PHYS/QHP 5/>YRS: CPT

## 2020-11-12 PROCEDURE — 88341 IMHCHEM/IMCYTCHM EA ADD ANTB: CPT | Performed by: PATHOLOGY

## 2020-11-12 PROCEDURE — 38221 DX BONE MARROW BIOPSIES: CPT

## 2020-11-12 PROCEDURE — 99153 MOD SED SAME PHYS/QHP EA: CPT

## 2020-11-12 PROCEDURE — 99152 MOD SED SAME PHYS/QHP 5/>YRS: CPT | Performed by: RADIOLOGY

## 2020-11-12 PROCEDURE — 88184 FLOWCYTOMETRY/ TC 1 MARKER: CPT

## 2020-11-12 PROCEDURE — 38222 DX BONE MARROW BX & ASPIR: CPT | Performed by: RADIOLOGY

## 2020-11-12 PROCEDURE — 88262 CHROMOSOME ANALYSIS 15-20: CPT

## 2020-11-12 PROCEDURE — 77012 CT SCAN FOR NEEDLE BIOPSY: CPT

## 2020-11-12 RX ORDER — MIDAZOLAM HYDROCHLORIDE 2 MG/2ML
INJECTION, SOLUTION INTRAMUSCULAR; INTRAVENOUS CODE/TRAUMA/SEDATION MEDICATION
Status: COMPLETED | OUTPATIENT
Start: 2020-11-12 | End: 2020-11-12

## 2020-11-12 RX ADMIN — MIDAZOLAM 0.5 MG: 1 INJECTION INTRAMUSCULAR; INTRAVENOUS at 09:04

## 2020-11-12 RX ADMIN — MIDAZOLAM 0.5 MG: 1 INJECTION INTRAMUSCULAR; INTRAVENOUS at 08:57

## 2020-11-12 RX ADMIN — MIDAZOLAM 1 MG: 1 INJECTION INTRAMUSCULAR; INTRAVENOUS at 08:55

## 2020-11-12 RX ADMIN — MIDAZOLAM 0.5 MG: 1 INJECTION INTRAMUSCULAR; INTRAVENOUS at 08:59

## 2020-11-12 RX ADMIN — MIDAZOLAM 0.5 MG: 1 INJECTION INTRAMUSCULAR; INTRAVENOUS at 09:08

## 2020-11-18 ENCOUNTER — OFFICE VISIT (OUTPATIENT)
Dept: GASTROENTEROLOGY | Facility: CLINIC | Age: 50
End: 2020-11-18
Payer: COMMERCIAL

## 2020-11-18 VITALS
DIASTOLIC BLOOD PRESSURE: 70 MMHG | HEART RATE: 61 BPM | TEMPERATURE: 97.3 F | HEIGHT: 64 IN | WEIGHT: 148 LBS | BODY MASS INDEX: 25.27 KG/M2 | SYSTOLIC BLOOD PRESSURE: 120 MMHG

## 2020-11-18 DIAGNOSIS — K59.03 DRUG-INDUCED CONSTIPATION: ICD-10-CM

## 2020-11-18 DIAGNOSIS — R16.1 SPLENOMEGALY: ICD-10-CM

## 2020-11-18 DIAGNOSIS — R18.8 CIRRHOSIS OF LIVER WITH ASCITES, UNSPECIFIED HEPATIC CIRRHOSIS TYPE (HCC): Primary | ICD-10-CM

## 2020-11-18 DIAGNOSIS — B18.2 CHRONIC HEPATITIS C WITHOUT HEPATIC COMA (HCC): ICD-10-CM

## 2020-11-18 DIAGNOSIS — K74.60 CIRRHOSIS OF LIVER WITH ASCITES, UNSPECIFIED HEPATIC CIRRHOSIS TYPE (HCC): Primary | ICD-10-CM

## 2020-11-18 DIAGNOSIS — D61.818 PANCYTOPENIA (HCC): ICD-10-CM

## 2020-11-18 PROBLEM — U07.1 COVID-19: Status: RESOLVED | Noted: 2020-05-26 | Resolved: 2020-11-18

## 2020-11-18 PROCEDURE — 99214 OFFICE O/P EST MOD 30 MIN: CPT | Performed by: NURSE PRACTITIONER

## 2020-11-25 ENCOUNTER — TELEPHONE (OUTPATIENT)
Dept: HEMATOLOGY ONCOLOGY | Facility: CLINIC | Age: 50
End: 2020-11-25

## 2020-11-25 ENCOUNTER — LAB (OUTPATIENT)
Dept: LAB | Facility: HOSPITAL | Age: 50
End: 2020-11-25
Payer: COMMERCIAL

## 2020-11-25 DIAGNOSIS — R18.8 CIRRHOSIS OF LIVER WITH ASCITES, UNSPECIFIED HEPATIC CIRRHOSIS TYPE (HCC): ICD-10-CM

## 2020-11-25 DIAGNOSIS — D61.818 PANCYTOPENIA (HCC): ICD-10-CM

## 2020-11-25 DIAGNOSIS — K74.60 CIRRHOSIS OF LIVER WITH ASCITES, UNSPECIFIED HEPATIC CIRRHOSIS TYPE (HCC): ICD-10-CM

## 2020-11-25 LAB
ALBUMIN SERPL BCP-MCNC: 2.7 G/DL (ref 3.5–5)
ALP SERPL-CCNC: 85 U/L (ref 46–116)
ALT SERPL W P-5'-P-CCNC: 256 U/L (ref 12–78)
ANION GAP SERPL CALCULATED.3IONS-SCNC: 3 MMOL/L (ref 4–13)
AST SERPL W P-5'-P-CCNC: 284 U/L (ref 5–45)
BASOPHILS # BLD AUTO: 0.01 THOUSANDS/ΜL (ref 0–0.1)
BASOPHILS NFR BLD AUTO: 1 % (ref 0–1)
BILIRUB SERPL-MCNC: 1.05 MG/DL (ref 0.2–1)
BUN SERPL-MCNC: 12 MG/DL (ref 5–25)
CALCIUM ALBUM COR SERPL-MCNC: 9.7 MG/DL (ref 8.3–10.1)
CALCIUM SERPL-MCNC: 8.7 MG/DL (ref 8.3–10.1)
CHLORIDE SERPL-SCNC: 102 MMOL/L (ref 100–108)
CO2 SERPL-SCNC: 31 MMOL/L (ref 21–32)
CREAT SERPL-MCNC: 0.69 MG/DL (ref 0.6–1.3)
EOSINOPHIL # BLD AUTO: 0.05 THOUSAND/ΜL (ref 0–0.61)
EOSINOPHIL NFR BLD AUTO: 3 % (ref 0–6)
ERYTHROCYTE [DISTWIDTH] IN BLOOD BY AUTOMATED COUNT: 14.5 % (ref 11.6–15.1)
GFR SERPL CREATININE-BSD FRML MDRD: 112 ML/MIN/1.73SQ M
GLUCOSE P FAST SERPL-MCNC: 114 MG/DL (ref 65–99)
HCT VFR BLD AUTO: 34.7 % (ref 36.5–49.3)
HGB BLD-MCNC: 11.4 G/DL (ref 12–17)
IMM GRANULOCYTES # BLD AUTO: 0.01 THOUSAND/UL (ref 0–0.2)
IMM GRANULOCYTES NFR BLD AUTO: 1 % (ref 0–2)
INR PPP: 1.42 (ref 0.84–1.19)
LDH SERPL-CCNC: 230 U/L (ref 81–234)
LYMPHOCYTES # BLD AUTO: 0.48 THOUSANDS/ΜL (ref 0.6–4.47)
LYMPHOCYTES NFR BLD AUTO: 27 % (ref 14–44)
MCH RBC QN AUTO: 28.9 PG (ref 26.8–34.3)
MCHC RBC AUTO-ENTMCNC: 32.9 G/DL (ref 31.4–37.4)
MCV RBC AUTO: 88 FL (ref 82–98)
MONOCYTES # BLD AUTO: 0.24 THOUSAND/ΜL (ref 0.17–1.22)
MONOCYTES NFR BLD AUTO: 13 % (ref 4–12)
NEUTROPHILS # BLD AUTO: 1 THOUSANDS/ΜL (ref 1.85–7.62)
NEUTS SEG NFR BLD AUTO: 55 % (ref 43–75)
NRBC BLD AUTO-RTO: 0 /100 WBCS
PLATELET # BLD AUTO: 41 THOUSANDS/UL (ref 149–390)
PMV BLD AUTO: 11.1 FL (ref 8.9–12.7)
POTASSIUM SERPL-SCNC: 3.9 MMOL/L (ref 3.5–5.3)
PROT SERPL-MCNC: 7.5 G/DL (ref 6.4–8.2)
PROTHROMBIN TIME: 17.3 SECONDS (ref 11.6–14.5)
RBC # BLD AUTO: 3.94 MILLION/UL (ref 3.88–5.62)
SODIUM SERPL-SCNC: 136 MMOL/L (ref 136–145)
WBC # BLD AUTO: 1.79 THOUSAND/UL (ref 4.31–10.16)

## 2020-11-25 PROCEDURE — 88185 FLOWCYTOMETRY/TC ADD-ON: CPT

## 2020-11-25 PROCEDURE — 85610 PROTHROMBIN TIME: CPT

## 2020-11-25 PROCEDURE — 80053 COMPREHEN METABOLIC PANEL: CPT

## 2020-11-25 PROCEDURE — 36415 COLL VENOUS BLD VENIPUNCTURE: CPT

## 2020-11-25 PROCEDURE — 85025 COMPLETE CBC W/AUTO DIFF WBC: CPT

## 2020-11-25 PROCEDURE — 83615 LACTATE (LD) (LDH) ENZYME: CPT

## 2020-11-25 PROCEDURE — 88184 FLOWCYTOMETRY/ TC 1 MARKER: CPT

## 2020-11-27 LAB — SCAN RESULT: NORMAL

## 2020-12-11 ENCOUNTER — OFFICE VISIT (OUTPATIENT)
Dept: HEMATOLOGY ONCOLOGY | Facility: HOSPITAL | Age: 50
End: 2020-12-11
Payer: COMMERCIAL

## 2020-12-11 VITALS
DIASTOLIC BLOOD PRESSURE: 76 MMHG | HEART RATE: 73 BPM | RESPIRATION RATE: 16 BRPM | TEMPERATURE: 98.4 F | SYSTOLIC BLOOD PRESSURE: 112 MMHG | HEIGHT: 64 IN | WEIGHT: 150 LBS | BODY MASS INDEX: 25.61 KG/M2 | OXYGEN SATURATION: 97 %

## 2020-12-11 DIAGNOSIS — D61.818 PANCYTOPENIA (HCC): Primary | ICD-10-CM

## 2020-12-11 LAB — SCAN RESULT: NORMAL

## 2020-12-11 PROCEDURE — 99214 OFFICE O/P EST MOD 30 MIN: CPT | Performed by: INTERNAL MEDICINE

## 2020-12-18 LAB
Lab: NORMAL
Lab: NORMAL
MISCELLANEOUS LAB TEST RESULT: NORMAL
MISCELLANEOUS LAB TEST RESULT: NORMAL
SCAN RESULT: NORMAL

## 2021-03-23 DIAGNOSIS — R18.8 CIRRHOSIS OF LIVER WITH ASCITES, UNSPECIFIED HEPATIC CIRRHOSIS TYPE (HCC): ICD-10-CM

## 2021-03-23 DIAGNOSIS — K74.60 CIRRHOSIS OF LIVER WITH ASCITES, UNSPECIFIED HEPATIC CIRRHOSIS TYPE (HCC): ICD-10-CM

## 2021-03-24 DIAGNOSIS — K74.60 CIRRHOSIS OF LIVER WITH ASCITES, UNSPECIFIED HEPATIC CIRRHOSIS TYPE (HCC): ICD-10-CM

## 2021-03-24 DIAGNOSIS — R18.8 CIRRHOSIS OF LIVER WITH ASCITES, UNSPECIFIED HEPATIC CIRRHOSIS TYPE (HCC): ICD-10-CM

## 2021-03-24 RX ORDER — FUROSEMIDE 40 MG/1
40 TABLET ORAL 2 TIMES DAILY
Qty: 60 TABLET | Refills: 2 | Status: SHIPPED | OUTPATIENT
Start: 2021-03-24 | End: 2021-06-18

## 2021-03-24 RX ORDER — SPIRONOLACTONE 100 MG/1
100 TABLET, FILM COATED ORAL DAILY
Qty: 30 TABLET | Refills: 2 | Status: SHIPPED | OUTPATIENT
Start: 2021-03-24 | End: 2021-03-25

## 2021-03-25 RX ORDER — SPIRONOLACTONE 100 MG/1
TABLET, FILM COATED ORAL
Qty: 90 TABLET | Refills: 0 | Status: SHIPPED | OUTPATIENT
Start: 2021-03-25 | End: 2021-09-14 | Stop reason: SDUPTHER

## 2021-04-21 ENCOUNTER — TRANSCRIBE ORDERS (OUTPATIENT)
Dept: ADMINISTRATIVE | Facility: HOSPITAL | Age: 51
End: 2021-04-21

## 2021-04-21 ENCOUNTER — APPOINTMENT (OUTPATIENT)
Dept: LAB | Facility: HOSPITAL | Age: 51
End: 2021-04-21
Payer: COMMERCIAL

## 2021-04-21 DIAGNOSIS — B18.2 CHRONIC HEPATITIS C WITH HEPATIC COMA (HCC): Primary | ICD-10-CM

## 2021-04-21 DIAGNOSIS — B18.2 CHRONIC HEPATITIS C WITH HEPATIC COMA (HCC): ICD-10-CM

## 2021-04-21 DIAGNOSIS — K74.60 CIRRHOSIS OF LIVER WITHOUT ASCITES, UNSPECIFIED HEPATIC CIRRHOSIS TYPE (HCC): ICD-10-CM

## 2021-04-21 LAB
HBV CORE AB SER QL: NORMAL
HBV SURFACE AB SER-ACNC: <3.1 MIU/ML
HBV SURFACE AG SER QL: NORMAL

## 2021-04-21 PROCEDURE — 36415 COLL VENOUS BLD VENIPUNCTURE: CPT

## 2021-04-21 PROCEDURE — 86704 HEP B CORE ANTIBODY TOTAL: CPT

## 2021-04-21 PROCEDURE — 82977 ASSAY OF GGT: CPT

## 2021-04-21 PROCEDURE — 86706 HEP B SURFACE ANTIBODY: CPT

## 2021-04-21 PROCEDURE — 84460 ALANINE AMINO (ALT) (SGPT): CPT

## 2021-04-21 PROCEDURE — 87902 NFCT AGT GNTYP ALYS HEP C: CPT

## 2021-04-21 PROCEDURE — 87340 HEPATITIS B SURFACE AG IA: CPT

## 2021-04-21 PROCEDURE — 82247 BILIRUBIN TOTAL: CPT

## 2021-04-21 PROCEDURE — 82172 ASSAY OF APOLIPOPROTEIN: CPT

## 2021-04-21 PROCEDURE — 83010 ASSAY OF HAPTOGLOBIN QUANT: CPT

## 2021-04-21 PROCEDURE — 83883 ASSAY NEPHELOMETRY NOT SPEC: CPT

## 2021-04-21 PROCEDURE — 87522 HEPATITIS C REVRS TRNSCRPJ: CPT

## 2021-04-22 LAB
HCV RNA SERPL NAA+PROBE-ACNC: NORMAL IU/ML
HCV RNA SERPL NAA+PROBE-LOG IU: 6.5 LOG10 IU/ML
TEST INFORMATION: NORMAL

## 2021-04-23 LAB
A2 MACROGLOB SERPL-MCNC: 264 MG/DL (ref 110–276)
ALT SERPL W P-5'-P-CCNC: 65 IU/L (ref 0–55)
APO A-I SERPL-MCNC: 168 MG/DL (ref 101–178)
BILIRUB SERPL-MCNC: 1.3 MG/DL (ref 0–1.2)
COMMENT: ABNORMAL
FIBROSIS SCORING:: ABNORMAL
FIBROSIS STAGE SERPL QL: ABNORMAL
GGT SERPL-CCNC: 30 IU/L (ref 0–65)
HAPTOGLOB SERPL-MCNC: <10 MG/DL (ref 23–355)
INTERPRETATIONS: ABNORMAL
LIVER FIBR SCORE SERPL CALC.FIBROSURE: 0.77 (ref 0–0.21)
NECROINFLAMM ACTIVITY SCORING:: ABNORMAL
NECROINFLAMMATORY ACT GRADE SERPL QL: ABNORMAL
NECROINFLAMMATORY ACT SCORE SERPL: 0.57 (ref 0–0.17)
SERVICE CMNT-IMP: ABNORMAL

## 2021-04-26 LAB
HCV GENTYP SERPL NAA+PROBE: NORMAL
HCV PLEASE NOTE: NORMAL

## 2021-05-18 ENCOUNTER — OFFICE VISIT (OUTPATIENT)
Dept: GASTROENTEROLOGY | Facility: CLINIC | Age: 51
End: 2021-05-18
Payer: COMMERCIAL

## 2021-05-18 VITALS
DIASTOLIC BLOOD PRESSURE: 72 MMHG | WEIGHT: 156 LBS | HEIGHT: 64 IN | BODY MASS INDEX: 26.63 KG/M2 | SYSTOLIC BLOOD PRESSURE: 120 MMHG

## 2021-05-18 DIAGNOSIS — K74.60 CIRRHOSIS OF LIVER WITH ASCITES, UNSPECIFIED HEPATIC CIRRHOSIS TYPE (HCC): ICD-10-CM

## 2021-05-18 DIAGNOSIS — B18.2 CHRONIC HEPATITIS C WITHOUT HEPATIC COMA (HCC): Primary | ICD-10-CM

## 2021-05-18 DIAGNOSIS — R18.8 CIRRHOSIS OF LIVER WITH ASCITES, UNSPECIFIED HEPATIC CIRRHOSIS TYPE (HCC): ICD-10-CM

## 2021-05-18 PROCEDURE — 99214 OFFICE O/P EST MOD 30 MIN: CPT | Performed by: INTERNAL MEDICINE

## 2021-05-18 NOTE — PROGRESS NOTES
[facility] - Outpatient Follow-up Note  Karie Ambrose 52 y o  male MRN: [id number]  Encounter: [id number]    ASSESSMENT AND PLAN:      1  Cirrhosis of liver with ascites, unspecified hepatic cirrhosis type ([facility] [address] )  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  [facility]ed at [address] for liver transplant evaluation  Doing well on Lasix/Aldactone  EGD July 2 1020- for varices  Last hepatoma screening August 2020  Has immunity against hepatitis a but does not have immunity against hepatitis-B  - US abdomen limited; Future  - continue Lasix and Aldactone  - eventually should have hepatitis-B vaccination    2  Chronic hepatitis C without hepatic coma (HCC)  Genotype 1A  Initially not treated since was going to be on the liver transplant list   Now that meld score has improved [facility] would like him treated  Patient once a done in [facility] since his easier for him  - will discuss with [facility] to find out the recommendations for treatment of this hepatitis C genotype 1A with decompensated cirrhosis      Followup Appointment: 4 months  ______________________________________________________________________    Chief Complaint   Patient presents with    Follow-up    Hepatitis C    Cirrhosis     HPI:  The patient is seen back in the office today  He has a history of decompensated cirrhosis with ascites secondary to alcohol and hepatitis C  He has been doing well with Lasix and Aldactone  He has been followed by the [address] transplant service  He is no longer drinking alcohol  When we last saw him there was discussions about treating his hepatitis C but the decision was made not to just in case he was going on the transplant list to broaden his potential donors  At this point time he is stabilized and his meld score has improved  [facility] is interested in getting him treated either in [place] or with us in [facility]    From a liver standpoint he is doing well  He denies any ascites or lower extremity edema  Denies any GI bleeding  Denies any encephalopathy although he reports that he had COVID and for several weeks following that had a little bit of the COVID fog  Historical Information   Past Medical History:   Diagnosis Date    Ascites     Cirrhosis ([address] )     [place] teeth extracted      Past Surgical History:   Procedure Laterality Date    IR BIOPSY BONE MARROW  11/12/2020    IR PARACENTESIS  5/26/2020    IR PARACENTESIS  6/3/2020    IR PARACENTESIS  6/9/2020    PARACENTESIS  5/29/2020          Social History     Substance and Sexual Activity   Alcohol Use Not Currently    Frequency: Never    Binge frequency: Never     Social History     Substance and Sexual Activity   Drug Use Not Currently    Comment: methadone for 20 years      Social History     Tobacco Use   Smoking Status Never Smoker   Smokeless Tobacco Never Used     Family History   Problem Relation Age of Onset    Cirrhosis Maternal Grandmother         alcoholic cirrhosis    Colon polyps Neg Hx     Colon cancer Neg Hx          Current Outpatient Medications:     acetaminophen (TYLENOL) 325 mg tablet    furosemide (LASIX) 40 mg tablet    METHADONE HCL PO    spironolactone (ALDACTONE) 100 mg tablet  No Known Allergies  Reviewed medications and allergies and updated as indicated    PHYSICAL EXAM:    Blood pressure 120/72, height 5' 4" (1 626 m), weight 70 8 kg (156 lb)  Body mass index is 26 78 kg/m²  General Appearance: NAD, cooperative, alert  Eyes: Anicteric, PERRLA, EOMI  ENT:  Normocephalic, atraumatic, normal mucosa  Neck:  Supple, symmetrical, trachea midline  Resp:  Clear to auscultation bilaterally; no rales, rhonchi or wheezing; respirations unlabored   CV:  S1 S2, Regular rate and rhythm; no murmur, rub, or gallop    GI:  Soft, non-tender, non-distended; normal bowel sounds; no masses, no organomegaly   Rectal: Deferred  Musculoskeletal: No cyanosis, clubbing or edema  Normal ROM  Skin:  No jaundice, rashes, or lesions   Heme/Lymph: No palpable cervical lymphadenopathy  Psych: Normal affect, good eye contact  Neuro: No gross deficits, AAOx3 no asterixis    Lab Results:   Lab Results   Component Value Date    WBC 1 79 (LL) 11/25/2020    HGB 11 4 (L) 11/25/2020    HCT 34 7 (L) 11/25/2020    MCV 88 11/25/2020    PLT 41 (LL) 11/25/2020     Lab Results   Component Value Date    K 3 9 11/25/2020     11/25/2020    CO2 31 11/25/2020    BUN 12 11/25/2020    CREATININE 0 69 11/25/2020    GLUF 114 (H) 11/25/2020    CALCIUM 8 7 11/25/2020    CORRECTEDCA 9 7 11/25/2020     (H) 11/25/2020    ALT 65 (H) 04/21/2021    ALKPHOS 85 11/25/2020    EGFR 112 11/25/2020     Lab Results   Component Value Date    IRON 48 (L) 05/27/2020    TIBC 187 (L) 05/27/2020    FERRITIN 161 05/27/2020     Lab Results   Component Value Date    LIPASE 188 05/26/2020       Radiology Results:   No results found

## 2021-05-18 NOTE — LETTER
May 18, 2021     Ana Maria Yusuf, [address] [address]    Patient: Urbano Montgomery   YOB: 1970   Date of Visit: 5/18/2021       Dear Dr Aurora Albrechtf:    Thank you for referring Melinda Clifford to me for evaluation  Below are my notes for this consultation  If you have questions, please do not hesitate to call me  I look forward to following your patient along with you  Sincerely,        Shruthi De Luna MD        CC: No Recipients  Shruthi De Luna MD  5/18/2021  5:44 PM  Sign when Signing Visit  [address] [facility] - Outpatient Follow-up Note  Urbano Montgomery 52 y o  male MRN: [id number]  Encounter: [id number]    ASSESSMENT AND PLAN:      1  Cirrhosis of liver with ascites, unspecified hepatic cirrhosis type ([facility] [address] )  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  Followed at [facility] for liver transplant evaluation  Doing well on Lasix/Aldactone  EGD July 2 1020- for varices  Last hepatoma screening August 2020  Has immunity against hepatitis a but does not have immunity against hepatitis-B  - US abdomen limited; Future  - continue Lasix and Aldactone  - eventually should have hepatitis-B vaccination    2  Chronic hepatitis C without hepatic coma (HCC)  Genotype 1A  Initially not treated since was going to be on the liver transplant list   Now that meld score has improved [facility] would like him treated  Patient once a done in [facility] since his easier for him  - will discuss with [facility] to find out the recommendations for treatment of this hepatitis C genotype 1A with decompensated cirrhosis      Followup Appointment: 4 months  ______________________________________________________________________    Chief Complaint   Patient presents with    Follow-up    Hepatitis C    Cirrhosis     HPI:  The patient is seen back in the office today    He has a history of decompensated cirrhosis with ascites secondary to alcohol and hepatitis C  He has been doing well with Lasix and Aldactone  He has been followed by the [facility] transplant service  He is no longer drinking alcohol  When we last saw him there was discussions about treating his hepatitis C but the decision was made not to just in case he was going on the transplant list to broaden his potential donors  At this point time he is stabilized and his meld score has improved  [facility] is interested in getting him treated either in [place] or with us in [facility]  From a liver standpoint he is doing well  He denies any ascites or lower extremity edema  Denies any GI bleeding  Denies any encephalopathy although he reports that he had COVID and for several weeks following that had a little bit of the COVID fog      Historical Information   Past Medical History:   Diagnosis Date    Ascites     Cirrhosis ([address] )     [place] teeth extracted      Past Surgical History:   Procedure Laterality Date    IR BIOPSY BONE MARROW  11/12/2020    IR PARACENTESIS  5/26/2020    IR PARACENTESIS  6/3/2020    IR PARACENTESIS  6/9/2020    PARACENTESIS  5/29/2020          Social History     Substance and Sexual Activity   Alcohol Use Not Currently    Frequency: Never    Binge frequency: Never     Social History     Substance and Sexual Activity   Drug Use Not Currently    Comment: methadone for 20 years      Social History     Tobacco Use   Smoking Status Never Smoker   Smokeless Tobacco Never Used     Family History   Problem Relation Age of Onset    Cirrhosis Maternal Grandmother         alcoholic cirrhosis    Colon polyps Neg Hx     Colon cancer Neg Hx          Current Outpatient Medications:     acetaminophen (TYLENOL) 325 mg tablet    furosemide (LASIX) 40 mg tablet    METHADONE HCL PO    spironolactone (ALDACTONE) 100 mg tablet  No Known Allergies  Reviewed medications and allergies and updated as indicated    PHYSICAL EXAM:    Blood pressure 120/72, height 5' 4" (1 626 m), weight 70 8 kg (156 lb)  Body mass index is 26 78 kg/m²  General Appearance: NAD, cooperative, alert  Eyes: Anicteric, PERRLA, EOMI  ENT:  Normocephalic, atraumatic, normal mucosa  Neck:  Supple, symmetrical, trachea midline  Resp:  Clear to auscultation bilaterally; no rales, rhonchi or wheezing; respirations unlabored   CV:  S1 S2, Regular rate and rhythm; no murmur, rub, or gallop  GI:  Soft, non-tender, non-distended; normal bowel sounds; no masses, no organomegaly   Rectal: Deferred  Musculoskeletal: No cyanosis, clubbing or edema  Normal ROM  Skin:  No jaundice, rashes, or lesions   Heme/Lymph: No palpable cervical lymphadenopathy  Psych: Normal affect, good eye contact  Neuro: No gross deficits, AAOx3 no asterixis    Lab Results:   Lab Results   Component Value Date    WBC 1 79 (LL) 11/25/2020    HGB 11 4 (L) 11/25/2020    HCT 34 7 (L) 11/25/2020    MCV 88 11/25/2020    PLT 41 (LL) 11/25/2020     Lab Results   Component Value Date    K 3 9 11/25/2020     11/25/2020    CO2 31 11/25/2020    BUN 12 11/25/2020    CREATININE 0 69 11/25/2020    GLUF 114 (H) 11/25/2020    CALCIUM 8 7 11/25/2020    CORRECTEDCA 9 7 11/25/2020     (H) 11/25/2020    ALT 65 (H) 04/21/2021    ALKPHOS 85 11/25/2020    EGFR 112 11/25/2020     Lab Results   Component Value Date    IRON 48 (L) 05/27/2020    TIBC 187 (L) 05/27/2020    FERRITIN 161 05/27/2020     Lab Results   Component Value Date    LIPASE 188 05/26/2020       Radiology Results:   No results found

## 2021-05-18 NOTE — PATIENT INSTRUCTIONS
New current medications  Abdominal ultrasound    Our office will reach out to you to start hepatitis C treatment

## 2021-05-19 ENCOUNTER — TELEPHONE (OUTPATIENT)
Dept: GASTROENTEROLOGY | Facility: CLINIC | Age: 51
End: 2021-05-19

## 2021-05-19 NOTE — TELEPHONE ENCOUNTER
Discussed with transplant coordinator at [facility]    She will talk to Dr Haylee Romo who is the patient's attending hepatologist there to make a recommendation on which hepatitis C antiviral regimen we should use

## 2021-05-21 ENCOUNTER — TELEPHONE (OUTPATIENT)
Dept: GASTROENTEROLOGY | Facility: CLINIC | Age: 51
End: 2021-05-21

## 2021-06-02 NOTE — TELEPHONE ENCOUNTER
DENIAL received for EPCLUSA  from [employer]  Patient ID:[id number]  If treating physician would like to discuss coverage determination please call Pharmacy Services at [phone],  Calling as Physician, NPI#, Calling about Prior Authorizations  [id number]  PLAN PREFERS BRAND NAME, will resubmit through Cover [employer]

## 2021-06-03 NOTE — TELEPHONE ENCOUNTER
I spoke with Juan Manuel Gambino  Advised for him not to panic if he receive the denial letter for the Sofos/velpa Tab [phone]  His insurance prefers [address]  We resubmitted PA for [facility], awaiting determination

## 2021-06-04 ENCOUNTER — HOSPITAL ENCOUNTER (OUTPATIENT)
Dept: ULTRASOUND IMAGING | Facility: HOSPITAL | Age: 51
Discharge: HOME | End: 2021-06-04
Attending: INTERNAL MEDICINE
Payer: COMMERCIAL

## 2021-06-04 DIAGNOSIS — R18.8 CIRRHOSIS OF LIVER WITH ASCITES, UNSPECIFIED HEPATIC CIRRHOSIS TYPE (HCC): ICD-10-CM

## 2021-06-04 DIAGNOSIS — K74.60 CIRRHOSIS OF LIVER WITH ASCITES, UNSPECIFIED HEPATIC CIRRHOSIS TYPE (HCC): ICD-10-CM

## 2021-06-04 PROCEDURE — 76705 ECHO EXAM OF ABDOMEN: CPT

## 2021-06-14 ENCOUNTER — TELEPHONE (OUTPATIENT)
Dept: GASTROENTEROLOGY | Facility: CLINIC | Age: 51
End: 2021-06-14

## 2021-06-14 DIAGNOSIS — Z01.812 BLOOD TESTS PRIOR TO TREATMENT OR PROCEDURE: Primary | ICD-10-CM

## 2021-06-14 DIAGNOSIS — K74.60 CIRRHOSIS OF LIVER WITH ASCITES, UNSPECIFIED HEPATIC CIRRHOSIS TYPE (HCC): ICD-10-CM

## 2021-06-14 DIAGNOSIS — R18.8 CIRRHOSIS OF LIVER WITH ASCITES, UNSPECIFIED HEPATIC CIRRHOSIS TYPE (HCC): ICD-10-CM

## 2021-06-14 DIAGNOSIS — K76.89 LIVER CYST: ICD-10-CM

## 2021-06-14 DIAGNOSIS — R93.2 ABNORMAL ULTRASOUND OF LIVER: ICD-10-CM

## 2021-06-14 NOTE — TELEPHONE ENCOUNTER
----- Message from Rober Gtz MD sent at 6/11/2021  5:06 PM EDT -----  Discussed with patient  Five mm echogenic nodule in the left lobe of the liver  Cannot exclude hepatoma    Please arrange for patient to have MRI of the liver with gadolinium

## 2021-06-17 DIAGNOSIS — K74.60 CIRRHOSIS OF LIVER WITH ASCITES, UNSPECIFIED HEPATIC CIRRHOSIS TYPE (HCC): ICD-10-CM

## 2021-06-17 DIAGNOSIS — R18.8 CIRRHOSIS OF LIVER WITH ASCITES, UNSPECIFIED HEPATIC CIRRHOSIS TYPE (HCC): ICD-10-CM

## 2021-06-18 NOTE — TELEPHONE ENCOUNTER
Last OV 5/18/2021   Last Procedure N/A  Last Refill 3/24/2021 with 2 refills  Diagnosis Cirrhosis of liver  Recommended F/U    4 months                                      Continue Lasix per OV  Recall created/Next OV    Recall entered  Other      Pt to get MRI

## 2021-06-18 NOTE — TELEPHONE ENCOUNTER
Approval received for Epclusa 24 weeks, but his co-pay is over $5,800  [employer] required another benefits investigation to cover high co-pay  Ed Cava helping to process, they will be filling medication

## 2021-06-21 ENCOUNTER — TELEPHONE (OUTPATIENT)
Dept: GASTROENTEROLOGY | Facility: CLINIC | Age: 51
End: 2021-06-21

## 2021-06-21 RX ORDER — FUROSEMIDE 40 MG/1
TABLET ORAL
Qty: 180 TABLET | Refills: 4 | Status: SHIPPED | OUTPATIENT
Start: 2021-06-21

## 2021-06-21 NOTE — TELEPHONE ENCOUNTER
I returned call, patient states he received his Omie Karmen and was unsure if he was to start  I advised he should start today

## 2021-06-21 NOTE — TELEPHONE ENCOUNTER
Pt left [facility] asking for  [phone]; got med in the mail for Hep C/has ques about starting

## 2021-06-26 ENCOUNTER — APPOINTMENT (OUTPATIENT)
Dept: LAB | Facility: HOSPITAL | Age: 51
End: 2021-06-26
Attending: INTERNAL MEDICINE
Payer: COMMERCIAL

## 2021-06-26 DIAGNOSIS — R93.2 ABNORMAL ULTRASOUND OF LIVER: ICD-10-CM

## 2021-06-26 DIAGNOSIS — R18.8 CIRRHOSIS OF LIVER WITH ASCITES, UNSPECIFIED HEPATIC CIRRHOSIS TYPE (HCC): ICD-10-CM

## 2021-06-26 DIAGNOSIS — K76.89 LIVER CYST: ICD-10-CM

## 2021-06-26 DIAGNOSIS — Z01.812 BLOOD TESTS PRIOR TO TREATMENT OR PROCEDURE: ICD-10-CM

## 2021-06-26 DIAGNOSIS — K74.60 CIRRHOSIS OF LIVER WITH ASCITES, UNSPECIFIED HEPATIC CIRRHOSIS TYPE (HCC): ICD-10-CM

## 2021-06-26 LAB
BUN SERPL-MCNC: 15 MG/DL (ref 5–25)
CREAT SERPL-MCNC: 0.66 MG/DL (ref 0.6–1.3)
GFR SERPL CREATININE-BSD FRML MDRD: 114 ML/MIN/1.73SQ M

## 2021-06-26 PROCEDURE — 82565 ASSAY OF CREATININE: CPT

## 2021-06-26 PROCEDURE — 36415 COLL VENOUS BLD VENIPUNCTURE: CPT

## 2021-06-26 PROCEDURE — 84520 ASSAY OF UREA NITROGEN: CPT

## 2021-07-06 ENCOUNTER — HOSPITAL ENCOUNTER (OUTPATIENT)
Dept: MRI IMAGING | Facility: HOSPITAL | Age: 51
Discharge: HOME | End: 2021-07-06
Attending: INTERNAL MEDICINE
Payer: COMMERCIAL

## 2021-07-06 DIAGNOSIS — R93.2 ABNORMAL ULTRASOUND OF LIVER: ICD-10-CM

## 2021-07-06 DIAGNOSIS — K76.89 LIVER CYST: ICD-10-CM

## 2021-07-06 DIAGNOSIS — R18.8 CIRRHOSIS OF LIVER WITH ASCITES, UNSPECIFIED HEPATIC CIRRHOSIS TYPE (HCC): ICD-10-CM

## 2021-07-06 DIAGNOSIS — K74.60 CIRRHOSIS OF LIVER WITH ASCITES, UNSPECIFIED HEPATIC CIRRHOSIS TYPE (HCC): ICD-10-CM

## 2021-07-06 PROCEDURE — 74183 MRI ABD W/O CNTR FLWD CNTR: CPT

## 2021-07-06 PROCEDURE — G1004 CDSM NDSC: HCPCS

## 2021-07-06 PROCEDURE — A9585 GADOBUTROL INJECTION: HCPCS | Performed by: INTERNAL MEDICINE

## 2021-07-06 RX ADMIN — GADOBUTROL 7 ML: 604.72 INJECTION INTRAVENOUS at 16:30

## 2021-07-06 NOTE — LETTER
[address] [place] [address]  [address] [facility] [address]      July 19, 2021    MRN: [id number]     Phone: [phone]     Dear Mr Neto Bales recently had a(n) MRI performed on 7/6/2021 at  [address] [place] [address] that was requested by Dolores Grace MD  The study was reviewed by a radiologist, which is a physician who specializes in medical imaging  The radiologist issued a report describing his or her findings  In that report there was a finding that the radiologist felt warranted further discussion with your health care provider and that discussion would be beneficial to you  The results were sent to Dolores Grace MD on 07/12/2021  3:11 PM  We recommend that you contact Dolores Grace MD at [phone] or set up an appointment to discuss the results of the imaging test  If you have already heard from Dolores Grace MD regarding the results of your study, you can disregard this letter  This letter is not meant to alarm you, but intended to encourage you to follow-up on your results with the provider that sent you for the imaging study  In addition, we have enclosed answers to frequently asked questions by other patients who have also received a letter to review results with their health care provider (see page two)  Thank you for choosing [address] [place] [address] for your medical imaging needs  FREQUENTLY ASKED QUESTIONS    1  Why am I receiving this letter? [facility] requires us to notify patients who have findings on imaging exams that may require more testing or follow-up with a health professional within the next 3 months          2  How serious is the finding on the imaging test?  This letter is sent to all patients who may need follow-up or more testing within the next 3 months  Receiving this letter does not necessarily mean you have a life-threatening imaging finding or disease  Recommendations in the radiologists imaging report are general in nature and it is up to your healthcare provider to say whether those recommendations make sense for your situation  You are strongly encouraged to talk to your health care provider about the results and ask whether additional steps need to be taken  3  Where can I get a copy of the final report for my recent radiology exam?  To get a full copy of the report you can access your records online at [email or web address] or please contact [address] [place] [address] Records Department at [phone] Monday through Friday between 8 am and 6 pm          4  What do I need to do now? Please contact your health care provider who requested the imaging study to discuss what further actions (if any) are needed  You may have already heard from (your ordering provider) in regard to this test in which case you can disregard this letter  NOTICE IN ACCORDANCE WITH THE [facility] PATIENT TEST RESULT INFORMATION ACT OF 2018    You are receiving this notice as a result of a determination by your diagnostic imaging service that further discussions of your test results are warranted and would be beneficial to you  The complete results of your test or tests have been or will be sent to the health care practitioner that ordered the test or tests  It is recommended that you contact your health care practitioner to discuss your results as soon as possible

## 2021-07-12 ENCOUNTER — TELEPHONE (OUTPATIENT)
Dept: GASTROENTEROLOGY | Facility: AMBULARY SURGERY CENTER | Age: 51
End: 2021-07-12

## 2021-07-12 NOTE — TELEPHONE ENCOUNTER
Received e mail from [facility] Liver transplant team requesting 7/6/21 MRI report  Called [place] [id number] and spoke to Yarely  He will contact reading room to expedite

## 2021-07-13 ENCOUNTER — TELEPHONE (OUTPATIENT)
Dept: GASTROENTEROLOGY | Facility: CLINIC | Age: 51
End: 2021-07-13

## 2021-07-13 DIAGNOSIS — B18.2 CHRONIC HEPATITIS C WITHOUT HEPATIC COMA (HCC): Primary | ICD-10-CM

## 2021-07-13 NOTE — TELEPHONE ENCOUNTER
----- Message from Abebe Duenas MD sent at 7/12/2021  5:48 PM EDT -----  Left message on patient's answering machine  MRI without evidence of hepatoma or abnormality seen on ultrasound  Repeat abdominal ultrasound 6 months  Make sure the [facility] liver transplant center gets copy of MRI    Thanks

## 2021-07-13 NOTE — TELEPHONE ENCOUNTER
RN from other [employer] emailed MRI report to [facility] Liver transplant team 7/12/21    I reached out to patient via portal message  He started Epclusa 24 weeks (Start 6/21/21- 12/06/21)  Results Date: 2/28/22    Future labs ordered for next week 7/19/21  CBC, CMP & HCV     Awaiting his response to see how he is feeling

## 2021-09-14 DIAGNOSIS — K74.60 CIRRHOSIS OF LIVER WITH ASCITES, UNSPECIFIED HEPATIC CIRRHOSIS TYPE (HCC): ICD-10-CM

## 2021-09-14 DIAGNOSIS — R18.8 CIRRHOSIS OF LIVER WITH ASCITES, UNSPECIFIED HEPATIC CIRRHOSIS TYPE (HCC): ICD-10-CM

## 2021-09-14 RX ORDER — SPIRONOLACTONE 100 MG/1
100 TABLET, FILM COATED ORAL DAILY
Qty: 90 TABLET | Refills: 1 | Status: SHIPPED | OUTPATIENT
Start: 2021-09-14 | End: 2022-03-16

## 2021-09-27 ENCOUNTER — OFFICE VISIT (OUTPATIENT)
Dept: GASTROENTEROLOGY | Facility: CLINIC | Age: 51
End: 2021-09-27
Payer: COMMERCIAL

## 2021-09-27 VITALS
DIASTOLIC BLOOD PRESSURE: 74 MMHG | HEIGHT: 64 IN | BODY MASS INDEX: 27.49 KG/M2 | SYSTOLIC BLOOD PRESSURE: 118 MMHG | WEIGHT: 161 LBS | HEART RATE: 72 BPM

## 2021-09-27 DIAGNOSIS — B18.2 CHRONIC HEPATITIS C WITHOUT HEPATIC COMA (HCC): ICD-10-CM

## 2021-09-27 DIAGNOSIS — K74.60 CIRRHOSIS OF LIVER WITH ASCITES, UNSPECIFIED HEPATIC CIRRHOSIS TYPE (HCC): Primary | ICD-10-CM

## 2021-09-27 DIAGNOSIS — R18.8 CIRRHOSIS OF LIVER WITH ASCITES, UNSPECIFIED HEPATIC CIRRHOSIS TYPE (HCC): Primary | ICD-10-CM

## 2021-09-27 PROCEDURE — 99214 OFFICE O/P EST MOD 30 MIN: CPT | Performed by: INTERNAL MEDICINE

## 2021-09-27 NOTE — LETTER
September 27, 2021     Manny Mitchell, [address] [place] [address]    Patient: Coby Lefort   YOB: 1970   Date of Visit: 9/27/2021       Dear Dr Aquino :    Thank you for referring Adilene Gonzalez to me for evaluation  Below are my notes for this consultation  If you have questions, please do not hesitate to call me  I look forward to following your patient along with you  Sincerely,        Javed Carrero MD        CC: No Recipients  Javed Carrero MD  9/27/2021  1:24 PM  Sign when Signing Visit  [facility] - Outpatient Follow-up Note  Coby Lefort 52 y o  male MRN: [id number]  Encounter: [id number]    ASSESSMENT AND PLAN:      1  Cirrhosis of liver with ascites, unspecified hepatic cirrhosis type ([facility] [address] )  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  had liver transplant evaluation at [facility] but did not qualify since his meld score was not high enough     Doing well on Lasix/Aldactone  EGD July 2020 negative for varices  MRI July 2021 negative for hepatoma  Has immunity against hepatitis A but does not have immunity against hepatitis-B  - EGD at [facility] for esophageal varices screening   - will need repeat MRI in January  - eventually should have hepatitis-B vaccination  - CBC; Future  - Comprehensive metabolic panel; Future  - Protime-INR; Future    2  Chronic hepatitis C without hepatic coma (HCC)  Scheduled to complete 6 months of Epclusa  Tolerating so far  Last dose December 2021  - complete course of Epclusa  - Hepatitis C RNA, quantitative, PCR; Future      Followup Appointment:  6 month  ______________________________________________________________________    Chief Complaint   Patient presents with    Follow up-cirrhosis, hep c     HPI:  The patient is seen back in the office today    He has a history of decompensated cirrhosis secondary to genotype 1 hepatitis C  He had a liver transplant evaluation at [facility] but his meld score was too low to be listed  He continues take Lasix and Aldactone for ascites  He denies any lower extremity edema or ascites  He denies any significant encephalopathy  He denies any easy bleeding/bruising  His weight is stable  Overall he is feeling well  Historical Information   Past Medical History:   Diagnosis Date    Ascites     Cirrhosis ([address] )     [place] teeth extracted      Past Surgical History:   Procedure Laterality Date    IR BIOPSY BONE MARROW  11/12/2020    IR PARACENTESIS  5/26/2020    IR PARACENTESIS  6/3/2020    IR PARACENTESIS  6/9/2020    PARACENTESIS  5/29/2020          Social History     Substance and Sexual Activity   Alcohol Use Not Currently     Social History     Substance and Sexual Activity   Drug Use Not Currently    Comment: methadone for 20 years      Social History     Tobacco Use   Smoking Status Never Smoker   Smokeless Tobacco Never Used     Family History   Problem Relation Age of Onset    Cirrhosis Maternal Grandmother         alcoholic cirrhosis    Colon polyps Neg Hx     Colon cancer Neg Hx          Current Outpatient Medications:     acetaminophen (TYLENOL) 325 mg tablet    furosemide (LASIX) 40 mg tablet    METHADONE HCL PO    spironolactone (ALDACTONE) 100 mg tablet  No Known Allergies  Reviewed medications and allergies and updated as indicated    PHYSICAL EXAM:    Blood pressure 118/74, pulse 72, height 5' 4" (1 626 m), weight 73 kg (161 lb)  Body mass index is 27 64 kg/m²  General Appearance: NAD, cooperative, alert  Eyes: Anicteric, PERRLA, EOMI  ENT:  Normocephalic, atraumatic, normal mucosa  Neck:  Supple, symmetrical, trachea midline  Resp:  Clear to auscultation bilaterally; no rales, rhonchi or wheezing; respirations unlabored   CV:  S1 S2, Regular rate and rhythm; no murmur, rub, or gallop    GI:  Soft, non-tender, non-distended; normal bowel sounds; no masses, spleen tip palpated about 2 cm below the left lower rib  Rectal: Deferred  Musculoskeletal: No cyanosis, clubbing or edema  Normal ROM  Skin:  No jaundice, rashes, or lesions   Heme/Lymph: No palpable cervical lymphadenopathy  Psych: Normal affect, good eye contact  Neuro: No gross deficits, AAOx3, no asterixis    Lab Results:   Lab Results   Component Value Date    WBC 1 79 (LL) 11/25/2020    HGB 11 4 (L) 11/25/2020    HCT 34 7 (L) 11/25/2020    MCV 88 11/25/2020    PLT 41 (LL) 11/25/2020     Lab Results   Component Value Date    K 3 9 11/25/2020     11/25/2020    CO2 31 11/25/2020    BUN 15 06/26/2021    CREATININE 0 66 06/26/2021    GLUF 114 (H) 11/25/2020    CALCIUM 8 7 11/25/2020    CORRECTEDCA 9 7 11/25/2020     (H) 11/25/2020    ALT 65 (H) 04/21/2021    ALKPHOS 85 11/25/2020    EGFR 114 06/26/2021     Lab Results   Component Value Date    IRON 48 (L) 05/27/2020    TIBC 187 (L) 05/27/2020    FERRITIN 161 05/27/2020     Lab Results   Component Value Date    LIPASE 188 05/26/2020       Radiology Results:     MRI of the abdomen:  Cirrhosis and splenomegaly    Negative hepatoma (7/6/21)

## 2021-09-27 NOTE — PATIENT INSTRUCTIONS
Continue Lasix and Aldactone  Complete course of Epclusa for hepatitis-C  Blood work in the next couple of weeks  EGD to evaluate varices  Follow-up office visit 6 months    Will recheck MRI prior to that this

## 2021-10-11 ENCOUNTER — ANESTHESIA EVENT (OUTPATIENT)
Dept: GASTROENTEROLOGY | Facility: AMBULATORY SURGERY CENTER | Age: 51
End: 2021-10-11

## 2021-10-12 ENCOUNTER — HOSPITAL ENCOUNTER (OUTPATIENT)
Dept: GASTROENTEROLOGY | Facility: AMBULATORY SURGERY CENTER | Age: 51
Discharge: HOME | End: 2021-10-12
Payer: COMMERCIAL

## 2021-10-12 ENCOUNTER — ANESTHESIA (OUTPATIENT)
Dept: GASTROENTEROLOGY | Facility: AMBULATORY SURGERY CENTER | Age: 51
End: 2021-10-12

## 2021-10-12 VITALS
HEART RATE: 62 BPM | OXYGEN SATURATION: 96 % | SYSTOLIC BLOOD PRESSURE: 98 MMHG | DIASTOLIC BLOOD PRESSURE: 53 MMHG | TEMPERATURE: 98.4 F | RESPIRATION RATE: 20 BRPM

## 2021-10-12 DIAGNOSIS — K74.60 CIRRHOSIS OF LIVER WITH ASCITES, UNSPECIFIED HEPATIC CIRRHOSIS TYPE (HCC): ICD-10-CM

## 2021-10-12 DIAGNOSIS — R18.8 CIRRHOSIS OF LIVER WITH ASCITES, UNSPECIFIED HEPATIC CIRRHOSIS TYPE (HCC): ICD-10-CM

## 2021-10-12 PROCEDURE — 88305 TISSUE EXAM BY PATHOLOGIST: CPT | Performed by: PATHOLOGY

## 2021-10-12 PROCEDURE — 43239 EGD BIOPSY SINGLE/MULTIPLE: CPT | Performed by: INTERNAL MEDICINE

## 2021-10-12 RX ORDER — PROPOFOL 10 MG/ML
INJECTION, EMULSION INTRAVENOUS AS NEEDED
Status: DISCONTINUED | OUTPATIENT
Start: 2021-10-12 | End: 2021-10-12

## 2021-10-12 RX ORDER — SODIUM CHLORIDE, SODIUM LACTATE, POTASSIUM CHLORIDE, CALCIUM CHLORIDE 600; 310; 30; 20 MG/100ML; MG/100ML; MG/100ML; MG/100ML
50 INJECTION, SOLUTION INTRAVENOUS CONTINUOUS
Status: DISCONTINUED | OUTPATIENT
Start: 2021-10-12 | End: 2021-10-16 | Stop reason: HOSPADM

## 2021-10-12 RX ORDER — VELPATASVIR AND SOFOSBUVIR 100; 400 MG/1; MG/1
TABLET, FILM COATED ORAL
COMMUNITY
Start: 2021-08-19 | End: 2022-03-28

## 2021-10-12 RX ORDER — LIDOCAINE HYDROCHLORIDE 10 MG/ML
INJECTION, SOLUTION EPIDURAL; INFILTRATION; INTRACAUDAL; PERINEURAL AS NEEDED
Status: DISCONTINUED | OUTPATIENT
Start: 2021-10-12 | End: 2021-10-12

## 2021-10-12 RX ADMIN — PROPOFOL 100 MG: 10 INJECTION, EMULSION INTRAVENOUS at 07:35

## 2021-10-12 RX ADMIN — PROPOFOL 50 MG: 10 INJECTION, EMULSION INTRAVENOUS at 07:39

## 2021-10-12 RX ADMIN — PROPOFOL 50 MG: 10 INJECTION, EMULSION INTRAVENOUS at 07:37

## 2021-10-12 RX ADMIN — LIDOCAINE HYDROCHLORIDE 100 MG: 10 INJECTION, SOLUTION EPIDURAL; INFILTRATION; INTRACAUDAL; PERINEURAL at 07:35

## 2021-10-12 RX ADMIN — SODIUM CHLORIDE, SODIUM LACTATE, POTASSIUM CHLORIDE, CALCIUM CHLORIDE 50 ML/HR: 600; 310; 30; 20 INJECTION, SOLUTION INTRAVENOUS at 07:03

## 2022-01-12 ENCOUNTER — TELEPHONE (OUTPATIENT)
Dept: GASTROENTEROLOGY | Facility: CLINIC | Age: 52
End: 2022-01-12

## 2022-01-12 DIAGNOSIS — B18.2 CHRONIC HEPATITIS C WITHOUT HEPATIC COMA (HCC): ICD-10-CM

## 2022-01-12 DIAGNOSIS — K74.60 CIRRHOSIS OF LIVER WITH ASCITES, UNSPECIFIED HEPATIC CIRRHOSIS TYPE (HCC): ICD-10-CM

## 2022-01-12 DIAGNOSIS — R18.8 CIRRHOSIS OF LIVER WITH ASCITES, UNSPECIFIED HEPATIC CIRRHOSIS TYPE (HCC): ICD-10-CM

## 2022-01-12 DIAGNOSIS — K76.89 LIVER CYST: ICD-10-CM

## 2022-01-12 DIAGNOSIS — R93.2 ABNORMAL ULTRASOUND OF LIVER: Primary | ICD-10-CM

## 2022-01-12 NOTE — TELEPHONE ENCOUNTER
Pt left [facility] stating he lost papers from the last time he was seen/asks if he needs blood work or testing?  [phone]/KIS leave [facility]

## 2022-01-12 NOTE — TELEPHONE ENCOUNTER
Reviewed pt's chart--he was ordered lab work at his ov 9/27/21  As far as imaging he had an MRI 7/6/21 and noted to have a repeat US in 6 months  At his ov it mentions repeating the MRI in January  Pt notified of the lab orders and will proceed with those  Would you recommend he have a repeat US or MRI as both were noted    Thanks

## 2022-02-18 ENCOUNTER — LAB (OUTPATIENT)
Dept: LAB | Facility: HOSPITAL | Age: 52
End: 2022-02-18
Attending: INTERNAL MEDICINE
Payer: COMMERCIAL

## 2022-02-18 ENCOUNTER — TELEPHONE (OUTPATIENT)
Dept: OTHER | Facility: OTHER | Age: 52
End: 2022-02-18

## 2022-02-18 ENCOUNTER — HOSPITAL ENCOUNTER (OUTPATIENT)
Dept: ULTRASOUND IMAGING | Facility: HOSPITAL | Age: 52
Discharge: HOME | End: 2022-02-18
Attending: INTERNAL MEDICINE
Payer: COMMERCIAL

## 2022-02-18 DIAGNOSIS — R18.8 CIRRHOSIS OF LIVER WITH ASCITES, UNSPECIFIED HEPATIC CIRRHOSIS TYPE (HCC): ICD-10-CM

## 2022-02-18 DIAGNOSIS — K76.89 LIVER CYST: ICD-10-CM

## 2022-02-18 DIAGNOSIS — K74.60 CIRRHOSIS OF LIVER WITH ASCITES, UNSPECIFIED HEPATIC CIRRHOSIS TYPE (HCC): ICD-10-CM

## 2022-02-18 DIAGNOSIS — B18.2 CHRONIC HEPATITIS C WITHOUT HEPATIC COMA (HCC): ICD-10-CM

## 2022-02-18 DIAGNOSIS — R93.2 ABNORMAL ULTRASOUND OF LIVER: ICD-10-CM

## 2022-02-18 LAB
ALBUMIN SERPL BCP-MCNC: 3.7 G/DL (ref 3.5–5)
ALP SERPL-CCNC: 77 U/L (ref 46–116)
ALT SERPL W P-5'-P-CCNC: 23 U/L (ref 12–78)
ANION GAP SERPL CALCULATED.3IONS-SCNC: 3 MMOL/L (ref 4–13)
AST SERPL W P-5'-P-CCNC: 29 U/L (ref 5–45)
BILIRUB SERPL-MCNC: 0.9 MG/DL (ref 0.2–1)
BUN SERPL-MCNC: 18 MG/DL (ref 5–25)
CALCIUM SERPL-MCNC: 8.3 MG/DL (ref 8.3–10.1)
CHLORIDE SERPL-SCNC: 100 MMOL/L (ref 100–108)
CO2 SERPL-SCNC: 34 MMOL/L (ref 21–32)
CREAT SERPL-MCNC: 0.85 MG/DL (ref 0.6–1.3)
ERYTHROCYTE [DISTWIDTH] IN BLOOD BY AUTOMATED COUNT: 14.2 % (ref 11.6–15.1)
GFR SERPL CREATININE-BSD FRML MDRD: 102 ML/MIN/1.73SQ M
GLUCOSE SERPL-MCNC: 89 MG/DL (ref 65–140)
HCT VFR BLD AUTO: 39.5 % (ref 36.5–49.3)
HGB BLD-MCNC: 12.6 G/DL (ref 12–17)
INR PPP: 1.27 (ref 0.84–1.19)
MCH RBC QN AUTO: 27.5 PG (ref 26.8–34.3)
MCHC RBC AUTO-ENTMCNC: 31.9 G/DL (ref 31.4–37.4)
MCV RBC AUTO: 86 FL (ref 82–98)
PLATELET # BLD AUTO: 46 THOUSANDS/UL (ref 149–390)
PMV BLD AUTO: 9.6 FL (ref 8.9–12.7)
POTASSIUM SERPL-SCNC: 4.7 MMOL/L (ref 3.5–5.3)
PROT SERPL-MCNC: 8 G/DL (ref 6.4–8.2)
PROTHROMBIN TIME: 15.7 SECONDS (ref 11.6–14.5)
RBC # BLD AUTO: 4.58 MILLION/UL (ref 3.88–5.62)
SODIUM SERPL-SCNC: 137 MMOL/L (ref 136–145)
WBC # BLD AUTO: 2.48 THOUSAND/UL (ref 4.31–10.16)

## 2022-02-18 PROCEDURE — 85610 PROTHROMBIN TIME: CPT

## 2022-02-18 PROCEDURE — 80053 COMPREHEN METABOLIC PANEL: CPT

## 2022-02-18 PROCEDURE — 85027 COMPLETE CBC AUTOMATED: CPT

## 2022-02-18 PROCEDURE — 76700 US EXAM ABDOM COMPLETE: CPT

## 2022-02-18 PROCEDURE — 87522 HEPATITIS C REVRS TRNSCRPJ: CPT

## 2022-02-18 PROCEDURE — 36415 COLL VENOUS BLD VENIPUNCTURE: CPT

## 2022-02-18 NOTE — TELEPHONE ENCOUNTER
Lab Result: Platelet count 46   Date/Time Drawn: 02/18/2022  1424   Ordering Provider: Dr Cheryl Niño Name: Rafaela Kohli       The following critical/stat result was read back to the lab as stated above and [employer] to the on-call provider

## 2022-02-20 LAB
HCV RNA SERPL NAA+PROBE-ACNC: NORMAL IU/ML
TEST INFORMATION: NORMAL

## 2022-03-16 DIAGNOSIS — R18.8 CIRRHOSIS OF LIVER WITH ASCITES, UNSPECIFIED HEPATIC CIRRHOSIS TYPE (HCC): ICD-10-CM

## 2022-03-16 DIAGNOSIS — K74.60 CIRRHOSIS OF LIVER WITH ASCITES, UNSPECIFIED HEPATIC CIRRHOSIS TYPE (HCC): ICD-10-CM

## 2022-03-16 RX ORDER — SPIRONOLACTONE 100 MG/1
TABLET, FILM COATED ORAL
Qty: 90 TABLET | Refills: 1 | Status: SHIPPED | OUTPATIENT
Start: 2022-03-16

## 2022-03-28 ENCOUNTER — TELEPHONE (OUTPATIENT)
Dept: GASTROENTEROLOGY | Facility: CLINIC | Age: 52
End: 2022-03-28

## 2022-03-28 ENCOUNTER — OFFICE VISIT (OUTPATIENT)
Dept: GASTROENTEROLOGY | Facility: CLINIC | Age: 52
End: 2022-03-28
Payer: COMMERCIAL

## 2022-03-28 ENCOUNTER — PREP FOR PROCEDURE (OUTPATIENT)
Dept: GASTROENTEROLOGY | Facility: CLINIC | Age: 52
End: 2022-03-28

## 2022-03-28 VITALS
BODY MASS INDEX: 28.2 KG/M2 | HEIGHT: 64 IN | SYSTOLIC BLOOD PRESSURE: 130 MMHG | DIASTOLIC BLOOD PRESSURE: 84 MMHG | WEIGHT: 165.2 LBS

## 2022-03-28 DIAGNOSIS — K74.60 DECOMPENSATION OF CIRRHOSIS OF LIVER (HCC): ICD-10-CM

## 2022-03-28 DIAGNOSIS — R18.8 CIRRHOSIS OF LIVER WITH ASCITES, UNSPECIFIED HEPATIC CIRRHOSIS TYPE (HCC): Primary | ICD-10-CM

## 2022-03-28 DIAGNOSIS — K74.60 CIRRHOSIS OF LIVER WITH ASCITES, UNSPECIFIED HEPATIC CIRRHOSIS TYPE (HCC): Primary | ICD-10-CM

## 2022-03-28 DIAGNOSIS — K72.90 DECOMPENSATION OF CIRRHOSIS OF LIVER (HCC): ICD-10-CM

## 2022-03-28 DIAGNOSIS — Z12.11 COLON CANCER SCREENING: ICD-10-CM

## 2022-03-28 DIAGNOSIS — B18.2 CHRONIC HEPATITIS C WITHOUT HEPATIC COMA (HCC): ICD-10-CM

## 2022-03-28 DIAGNOSIS — Z12.11 COLON CANCER SCREENING: Primary | ICD-10-CM

## 2022-03-28 PROBLEM — B19.20 HEPATITIS C: Status: ACTIVE | Noted: 2020-06-04

## 2022-03-28 PROCEDURE — 99213 OFFICE O/P EST LOW 20 MIN: CPT | Performed by: INTERNAL MEDICINE

## 2022-03-28 RX ORDER — POLYETHYLENE GLYCOL 3350, SODIUM CHLORIDE, SODIUM BICARBONATE, POTASSIUM CHLORIDE 420; 11.2; 5.72; 1.48 G/4L; G/4L; G/4L; G/4L
4000 POWDER, FOR SOLUTION ORAL ONCE
Qty: 4000 ML | Refills: 0 | OUTPATIENT
Start: 2022-03-28 | End: 2022-05-25

## 2022-03-28 NOTE — LETTER
March 28, 2022     Scot Savers, [address]  [place] [address] [place] [address]    Patient: Sana Hairston   YOB: 1970   Date of Visit: 3/28/2022       Dear Dr Jose Loja: Thank you for referring Azul Krishnan to me for evaluation  Below are my notes for this consultation  If you have questions, please do not hesitate to call me  I look forward to following your patient along with you  Sincerely,        Kamaljit Kimball MD        CC: No Recipients  Kamaljit Kimball MD  3/28/2022  4:23 PM  Sign when Signing Visit  [facility] - Outpatient Follow-up Note  Sana Hairston 54 y o  male MRN: [id number]  Encounter: [id number]    ASSESSMENT AND PLAN:      1  Cirrhosis of liver with ascites, unspecified hepatic cirrhosis type ([address] )  2  Decompensation of cirrhosis of liver (HCC)  Stable from liver standpoint  No evidence of ascites/edema on Lasix and Aldactone  Blood work from February 2022 stable    - continue Lasix/Aldactone  - CBC; Future 6 months  - Comprehensive metabolic panel; Future six-month  - Protime-INR; Future 6 months  - AFP tumor marker; Future six-month  - US abdomen limited; Future 6 months      3  Chronic hepatitis C without hepatic coma (HCC)  S/P 6 months of Eplusa  Finish December 2021  Viral load February 2020 to negative  - Hepatitis C RNA, quantitative, PCR; Future    4  Colon cancer screening  Average risk  Never had colonoscopy  - [address]oscopy [address]      Followup Appointment:  6 month office visit  ______________________________________________________________________    Chief Complaint   Patient presents with    Cirrhosis     follow up labs and US     HPI:  Patient returns today for follow-up  He is feeling well  He denies any ascites or lower extremity edema  Denies any abdominal pain  He denies any GI bleeding  His weight is increased but he is eating better and gaining muscle weight  He denies any heartburn or indigestion  He had blood work done in February which was all stable  He completed 6 months of Epclusa for his hepatitis-C and had a negative viral load February of 2022  He was previously evaluated at [facility] but is no longer following up with them because his MELD score is too low  His MELD score was 9 from the blood work in February    Historical Information   Past Medical History:   Diagnosis Date    Ascites     Cirrhosis ([address] )     Hepatitis C     [place] teeth extracted      Past Surgical History:   Procedure Laterality Date    IR BIOPSY BONE MARROW  11/12/2020    IR PARACENTESIS  5/26/2020    IR PARACENTESIS  6/3/2020    IR PARACENTESIS  6/9/2020    PARACENTESIS  5/29/2020         UPPER GASTROINTESTINAL ENDOSCOPY       Social History     Substance and Sexual Activity   Alcohol Use Not Currently     Social History     Substance and Sexual Activity   Drug Use Not Currently    Comment: methadone for 20 years      Social History     Tobacco Use   Smoking Status Never Smoker   Smokeless Tobacco Never Used     Family History   Problem Relation Age of Onset    Cirrhosis Maternal Grandmother         alcoholic cirrhosis    Colon polyps Neg Hx     Colon cancer Neg Hx          Current Outpatient Medications:     acetaminophen (TYLENOL) 325 mg tablet    furosemide (LASIX) 40 mg tablet    METHADONE HCL PO    spironolactone (ALDACTONE) 100 mg tablet    polyethylene glycol-electrolytes (TriLyte) 4000 mL solution  No Known Allergies  Reviewed medications and allergies and updated as indicated    PHYSICAL EXAM:    Blood pressure 130/84, height 5' 4" (1 626 m), weight 74 9 kg (165 lb 3 2 oz)  Body mass index is 28 36 kg/m²  General Appearance: NAD, cooperative, alert  Eyes: Anicteric, PERRLA, EOMI  ENT:  Normocephalic, atraumatic, normal mucosa      Neck:  Supple, symmetrical, trachea midline  Resp:  Clear to auscultation bilaterally; no rales, rhonchi or wheezing; respirations unlabored   CV:  S1 S2, Regular rate and rhythm; no murmur, rub, or gallop  GI:  Soft, non-tender, non-distended; normal bowel sounds; no masses,  + splenomegaly  Rectal: Deferred  Musculoskeletal: No cyanosis, clubbing or edema  Normal ROM  Skin:  No jaundice, rashes, or lesions, multiple tattoos  Heme/Lymph: No palpable cervical lymphadenopathy  Psych: Normal affect, good eye contact  Neuro: No gross deficits, AAOx3, no asterixis    Lab Results:   Lab Results   Component Value Date    WBC 2 48 (L) 02/18/2022    HGB 12 6 02/18/2022    HCT 39 5 02/18/2022    MCV 86 02/18/2022    PLT 46 (LL) 02/18/2022     Lab Results   Component Value Date    K 4 7 02/18/2022     02/18/2022    CO2 34 (H) 02/18/2022    BUN 18 02/18/2022    CREATININE 0 85 02/18/2022    GLUF 114 (H) 11/25/2020    CALCIUM 8 3 02/18/2022    CORRECTEDCA 9 7 11/25/2020    AST 29 02/18/2022    ALT 23 02/18/2022    ALKPHOS 77 02/18/2022    EGFR 102 02/18/2022     Lab Results   Component Value Date    IRON 48 (L) 05/27/2020    TIBC 187 (L) 05/27/2020    FERRITIN 161 05/27/2020     Lab Results   Component Value Date    LIPASE 188 05/26/2020       Radiology Results:   No results found

## 2022-03-28 NOTE — PROGRESS NOTES
[facility] - Outpatient Follow-up Note  Alejandrina Alatorre 54 y o  male MRN: [id number]  Encounter: [id number]    ASSESSMENT AND PLAN:      1  Cirrhosis of liver with ascites, unspecified hepatic cirrhosis type ([facility] [address] )  2  Decompensation of cirrhosis of liver (HCC)  Stable from liver standpoint  No evidence of ascites/edema on Lasix and Aldactone  Blood work from February 2022 stable    - continue Lasix/Aldactone  - CBC; Future 6 months  - Comprehensive metabolic panel; Future six-month  - Protime-INR; Future 6 months  - AFP tumor marker; Future six-month  - US abdomen limited; Future 6 months      3  Chronic hepatitis C without hepatic coma (HCC)  S/P 6 months of Eplusa  Finish December 2021  Viral load February 2020 to negative  - Hepatitis C RNA, quantitative, PCR; Future    4  Colon cancer screening  Average risk  Never had colonoscopy  - colonoscopy at [facility]      Followup Appointment:  6 month office visit  ______________________________________________________________________    Chief Complaint   Patient presents with    Cirrhosis     follow up labs and US     HPI:  Patient returns today for follow-up  He is feeling well  He denies any ascites or lower extremity edema  Denies any abdominal pain  He denies any GI bleeding  His weight is increased but he is eating better and gaining muscle weight  He denies any heartburn or indigestion  He had blood work done in February which was all stable  He completed 6 months of Epclusa for his hepatitis-C and had a negative viral load February of 2022  He was previously evaluated at \[facility]"" but is no longer following up with them because his MELD score is too low    His MELD score was 9 from the blood work in February    Historical Information   Past Medical History:   Diagnosis Date    Ascites     Cirrhosis ([facility] [address] )     Hepatitis C     [place] teeth extracted      Past Surgical History:   Procedure Laterality Date    IR BIOPSY BONE MARROW  11/12/2020    IR PARACENTESIS  5/26/2020    IR PARACENTESIS  6/3/2020    IR PARACENTESIS  6/9/2020    PARACENTESIS  5/29/2020         UPPER GASTROINTESTINAL ENDOSCOPY       Social History     Substance and Sexual Activity   Alcohol Use Not Currently     Social History     Substance and Sexual Activity   Drug Use Not Currently    Comment: methadone for 20 years      Social History     Tobacco Use   Smoking Status Never Smoker   Smokeless Tobacco Never Used     Family History   Problem Relation Age of Onset    Cirrhosis Maternal Grandmother         alcoholic cirrhosis    Colon polyps Neg Hx     Colon cancer Neg Hx          Current Outpatient Medications:     acetaminophen (TYLENOL) 325 mg tablet    furosemide (LASIX) 40 mg tablet    METHADONE HCL PO    spironolactone (ALDACTONE) 100 mg tablet    polyethylene glycol-electrolytes (TriLyte) 4000 mL solution  No Known Allergies  Reviewed medications and allergies and updated as indicated    PHYSICAL EXAM:    Blood pressure 130/84, height 5' 4" (1 626 m), weight 74 9 kg (165 lb 3 2 oz)  Body mass index is 28 36 kg/m²  General Appearance: NAD, cooperative, alert  Eyes: Anicteric, PERRLA, EOMI  ENT:  Normocephalic, atraumatic, normal mucosa  Neck:  Supple, symmetrical, trachea midline  Resp:  Clear to auscultation bilaterally; no rales, rhonchi or wheezing; respirations unlabored   CV:  S1 S2, Regular rate and rhythm; no murmur, rub, or gallop  GI:  Soft, non-tender, non-distended; normal bowel sounds; no masses,  + splenomegaly  Rectal: Deferred  Musculoskeletal: No cyanosis, clubbing or edema  Normal ROM    Skin:  No jaundice, rashes, or lesions, multiple tattoos  Heme/Lymph: No palpable cervical lymphadenopathy  Psych: Normal affect, good eye contact  Neuro: No gross deficits, AAOx3, no asterixis    Lab Results:   Lab Results   Component Value Date    WBC 2 48 (L) 02/18/2022    HGB 12 6 02/18/2022    HCT 39 5 02/18/2022    MCV 86 02/18/2022 PLT 46 (LL) 02/18/2022     Lab Results   Component Value Date    K 4 7 02/18/2022     02/18/2022    CO2 34 (H) 02/18/2022    BUN 18 02/18/2022    CREATININE 0 85 02/18/2022    GLUF 114 (H) 11/25/2020    CALCIUM 8 3 02/18/2022    CORRECTEDCA 9 7 11/25/2020    AST 29 02/18/2022    ALT 23 02/18/2022    ALKPHOS 77 02/18/2022    EGFR 102 02/18/2022     Lab Results   Component Value Date    IRON 48 (L) 05/27/2020    TIBC 187 (L) 05/27/2020    FERRITIN 161 05/27/2020     Lab Results   Component Value Date    LIPASE 188 05/26/2020       Radiology Results:   No results found

## 2022-03-28 NOTE — TELEPHONE ENCOUNTER
Scheduled date of colonoscopy (as of today): 4/28/2022  Physician performing colonoscopy:  Dr Get Mathews  Location of colonoscopy: [facility]  Bowel prep reviewed with patient:Tl  Instructions reviewed with patient by:Cary Snell CMA  Clearances: none

## 2022-04-27 ENCOUNTER — ANESTHESIA EVENT (OUTPATIENT)
Dept: ANESTHESIOLOGY | Facility: HOSPITAL | Age: 52
End: 2022-04-27

## 2022-04-27 ENCOUNTER — ANESTHESIA (OUTPATIENT)
Dept: ANESTHESIOLOGY | Facility: HOSPITAL | Age: 52
End: 2022-04-27

## 2022-04-27 RX ORDER — LIDOCAINE HYDROCHLORIDE 10 MG/ML
0.5 INJECTION, SOLUTION EPIDURAL; INFILTRATION; INTRACAUDAL; PERINEURAL ONCE AS NEEDED
Status: CANCELLED | OUTPATIENT
Start: 2022-04-27

## 2022-04-27 RX ORDER — SODIUM CHLORIDE 9 MG/ML
125 INJECTION, SOLUTION INTRAVENOUS CONTINUOUS
Status: CANCELLED | OUTPATIENT
Start: 2022-04-27

## 2022-04-27 NOTE — ANESTHESIA PREPROCEDURE EVALUATION
Procedure:  PRE-OP ONLY    Relevant Problems   GI/HEPATIC   (+) Cirrhosis of liver with ascites (HCC) (S/P Paracentesis)   (+) Decompensation of cirrhosis of liver (HCC)   (+) Hepatitis C      HEMATOLOGY   (+) Pancytopenia (HCC) (Last Plaltelet Count 42K in 2/22)      Other   (+) Colon cancer screening   (+) Drug-induced constipation   (+) Methadone maintenance therapy patient ([address] )   (+) Splenomegaly             Anesthesia Plan  ASA Score- 3     Anesthesia Type- IV sedation with anesthesia with ASA Monitors  Additional Monitors:   Airway Plan:           Plan Factors-    Chart reviewed  Induction- intravenous  Postoperative Plan-     Informed Consent- Anesthetic plan and risks discussed with patient  I personally reviewed this patient with the CRNA  Discussed and agreed on the Anesthesia Plan with the CRNA  Mat Arellano

## 2022-05-25 ENCOUNTER — HOSPITAL ENCOUNTER (OUTPATIENT)
Dept: GASTROENTEROLOGY | Facility: HOSPITAL | Age: 52
Setting detail: OUTPATIENT SURGERY
Discharge: HOME | End: 2022-05-25
Attending: INTERNAL MEDICINE | Admitting: INTERNAL MEDICINE
Payer: COMMERCIAL

## 2022-05-25 ENCOUNTER — ANESTHESIA EVENT (OUTPATIENT)
Dept: GASTROENTEROLOGY | Facility: HOSPITAL | Age: 52
End: 2022-05-25

## 2022-05-25 ENCOUNTER — ANESTHESIA (OUTPATIENT)
Dept: GASTROENTEROLOGY | Facility: HOSPITAL | Age: 52
End: 2022-05-25

## 2022-05-25 VITALS
RESPIRATION RATE: 16 BRPM | DIASTOLIC BLOOD PRESSURE: 63 MMHG | TEMPERATURE: 97.7 F | HEART RATE: 66 BPM | OXYGEN SATURATION: 97 % | SYSTOLIC BLOOD PRESSURE: 121 MMHG

## 2022-05-25 DIAGNOSIS — Z12.11 COLON CANCER SCREENING: ICD-10-CM

## 2022-05-25 PROCEDURE — 88305 TISSUE EXAM BY PATHOLOGIST: CPT | Performed by: PATHOLOGY

## 2022-05-25 PROCEDURE — 45380 COLONOSCOPY AND BIOPSY: CPT | Performed by: INTERNAL MEDICINE

## 2022-05-25 RX ORDER — LIDOCAINE HYDROCHLORIDE 10 MG/ML
0.5 INJECTION, SOLUTION EPIDURAL; INFILTRATION; INTRACAUDAL; PERINEURAL ONCE AS NEEDED
Status: DISCONTINUED | OUTPATIENT
Start: 2022-05-25 | End: 2022-05-29 | Stop reason: HOSPADM

## 2022-05-25 RX ORDER — LIDOCAINE HYDROCHLORIDE 10 MG/ML
INJECTION, SOLUTION EPIDURAL; INFILTRATION; INTRACAUDAL; PERINEURAL AS NEEDED
Status: DISCONTINUED | OUTPATIENT
Start: 2022-05-25 | End: 2022-05-25

## 2022-05-25 RX ORDER — PROPOFOL 10 MG/ML
INJECTION, EMULSION INTRAVENOUS AS NEEDED
Status: DISCONTINUED | OUTPATIENT
Start: 2022-05-25 | End: 2022-05-25

## 2022-05-25 RX ORDER — SODIUM CHLORIDE 9 MG/ML
125 INJECTION, SOLUTION INTRAVENOUS CONTINUOUS
Status: DISCONTINUED | OUTPATIENT
Start: 2022-05-25 | End: 2022-05-29 | Stop reason: HOSPADM

## 2022-05-25 RX ORDER — SODIUM CHLORIDE 9 MG/ML
INJECTION, SOLUTION INTRAVENOUS CONTINUOUS PRN
Status: DISCONTINUED | OUTPATIENT
Start: 2022-05-25 | End: 2022-05-25

## 2022-05-25 RX ADMIN — PROPOFOL 30 MG: 10 INJECTION, EMULSION INTRAVENOUS at 10:57

## 2022-05-25 RX ADMIN — LIDOCAINE HYDROCHLORIDE 50 MG: 10 INJECTION, SOLUTION EPIDURAL; INFILTRATION; INTRACAUDAL at 10:47

## 2022-05-25 RX ADMIN — PROPOFOL 100 MG: 10 INJECTION, EMULSION INTRAVENOUS at 10:47

## 2022-05-25 RX ADMIN — PROPOFOL 50 MG: 10 INJECTION, EMULSION INTRAVENOUS at 10:50

## 2022-05-25 RX ADMIN — PROPOFOL 30 MG: 10 INJECTION, EMULSION INTRAVENOUS at 10:53

## 2022-05-25 RX ADMIN — SODIUM CHLORIDE: 0.9 INJECTION, SOLUTION INTRAVENOUS at 10:43

## 2022-05-25 NOTE — ANESTHESIA POSTPROCEDURE EVALUATION
Post-Op Assessment Note    CV Status:  Stable  Pain Score: 0    Pain management: adequate     Mental Status:  Alert   Hydration Status:  Stable and euvolemic   PONV Controlled:  None   Airway Patency:  Patent      Post Op Vitals Reviewed: Yes      Staff: CRNA         No complications documented      BP   106/60   Temp     Pulse 71   Resp 18   SpO2 99

## 2022-05-25 NOTE — ANESTHESIA PREPROCEDURE EVALUATION
Procedure:  COLONOSCOPY    Relevant Problems   GI/HEPATIC   (+) Cirrhosis of liver with ascites (HCC)   (+) Decompensation of cirrhosis of liver (HCC)   (+) Hepatitis C      HEMATOLOGY   (+) Pancytopenia (HCC)      Other   (+) Methadone maintenance therapy patient ([facility] [address] )   (+) Splenomegaly        Physical Exam    Airway    Mallampati score: II  TM Distance: >3 FB  Neck ROM: full     Dental   upper dentures,     Cardiovascular  Cardiovascular exam normal    Pulmonary  Pulmonary exam normal     Other Findings        Anesthesia Plan  ASA Score- 3     Anesthesia Type- IV sedation with anesthesia with ASA Monitors  Additional Monitors:   Airway Plan:           Plan Factors-    Chart reviewed  Patient summary reviewed  Patient is not a current smoker  Induction- intravenous  Postoperative Plan-     Informed Consent- Anesthetic plan and risks discussed with patient  I personally reviewed this patient with the CRNA  Discussed and agreed on the Anesthesia Plan with the CRNA  Jaxon Dyer

## 2022-09-06 DIAGNOSIS — K74.60 CIRRHOSIS OF LIVER WITH ASCITES, UNSPECIFIED HEPATIC CIRRHOSIS TYPE (HCC): ICD-10-CM

## 2022-09-06 DIAGNOSIS — R18.8 CIRRHOSIS OF LIVER WITH ASCITES, UNSPECIFIED HEPATIC CIRRHOSIS TYPE (HCC): ICD-10-CM

## 2022-09-06 RX ORDER — FUROSEMIDE 40 MG/1
TABLET ORAL
Qty: 180 TABLET | Refills: 4 | Status: SHIPPED | OUTPATIENT
Start: 2022-09-06

## 2022-09-08 DIAGNOSIS — K74.60 CIRRHOSIS OF LIVER WITH ASCITES, UNSPECIFIED HEPATIC CIRRHOSIS TYPE (HCC): ICD-10-CM

## 2022-09-08 DIAGNOSIS — R18.8 CIRRHOSIS OF LIVER WITH ASCITES, UNSPECIFIED HEPATIC CIRRHOSIS TYPE (HCC): ICD-10-CM

## 2022-09-08 RX ORDER — SPIRONOLACTONE 100 MG/1
TABLET, FILM COATED ORAL
Qty: 90 TABLET | Refills: 1 | Status: SHIPPED | OUTPATIENT
Start: 2022-09-08

## 2022-10-25 ENCOUNTER — APPOINTMENT (OUTPATIENT)
Dept: LAB | Facility: HOSPITAL | Age: 52
End: 2022-10-25
Attending: INTERNAL MEDICINE
Payer: COMMERCIAL

## 2022-10-25 DIAGNOSIS — B18.2 CHRONIC HEPATITIS C WITHOUT HEPATIC COMA (HCC): ICD-10-CM

## 2022-10-25 DIAGNOSIS — R18.8 CIRRHOSIS OF LIVER WITH ASCITES, UNSPECIFIED HEPATIC CIRRHOSIS TYPE (HCC): ICD-10-CM

## 2022-10-25 DIAGNOSIS — K74.60 CIRRHOSIS OF LIVER WITH ASCITES, UNSPECIFIED HEPATIC CIRRHOSIS TYPE (HCC): ICD-10-CM

## 2022-10-25 LAB
AFP-TM SERPL-MCNC: 1.5 NG/ML (ref 0.5–8)
ALBUMIN SERPL BCP-MCNC: 3.7 G/DL (ref 3.5–5)
ALP SERPL-CCNC: 72 U/L (ref 46–116)
ALT SERPL W P-5'-P-CCNC: 25 U/L (ref 12–78)
ANION GAP SERPL CALCULATED.3IONS-SCNC: 3 MMOL/L (ref 4–13)
AST SERPL W P-5'-P-CCNC: 23 U/L (ref 5–45)
BILIRUB SERPL-MCNC: 0.98 MG/DL (ref 0.2–1)
BUN SERPL-MCNC: 14 MG/DL (ref 5–25)
CALCIUM SERPL-MCNC: 8.9 MG/DL (ref 8.3–10.1)
CHLORIDE SERPL-SCNC: 103 MMOL/L (ref 96–108)
CO2 SERPL-SCNC: 30 MMOL/L (ref 21–32)
CREAT SERPL-MCNC: 0.81 MG/DL (ref 0.6–1.3)
GFR SERPL CREATININE-BSD FRML MDRD: 104 ML/MIN/1.73SQ M
GLUCOSE P FAST SERPL-MCNC: 120 MG/DL (ref 65–99)
INR PPP: 1.17 (ref 0.84–1.19)
POTASSIUM SERPL-SCNC: 4.2 MMOL/L (ref 3.5–5.3)
PROT SERPL-MCNC: 7.3 G/DL (ref 6.4–8.4)
PROTHROMBIN TIME: 15.7 SECONDS (ref 11.6–14.5)
SODIUM SERPL-SCNC: 136 MMOL/L (ref 135–147)

## 2022-10-25 PROCEDURE — 85027 COMPLETE CBC AUTOMATED: CPT

## 2022-10-25 PROCEDURE — 36415 COLL VENOUS BLD VENIPUNCTURE: CPT

## 2022-10-25 PROCEDURE — 82105 ALPHA-FETOPROTEIN SERUM: CPT

## 2022-10-25 PROCEDURE — 80053 COMPREHEN METABOLIC PANEL: CPT

## 2022-10-25 PROCEDURE — 85610 PROTHROMBIN TIME: CPT

## 2022-10-25 PROCEDURE — 87522 HEPATITIS C REVRS TRNSCRPJ: CPT

## 2022-10-26 ENCOUNTER — TELEPHONE (OUTPATIENT)
Dept: GASTROENTEROLOGY | Facility: CLINIC | Age: 52
End: 2022-10-26

## 2022-10-26 LAB
ERYTHROCYTE [DISTWIDTH] IN BLOOD BY AUTOMATED COUNT: 14.5 % (ref 11.6–15.1)
HCT VFR BLD AUTO: 35.5 % (ref 36.5–49.3)
HGB BLD-MCNC: 11.9 G/DL (ref 12–17)
MCH RBC QN AUTO: 28.6 PG (ref 26.8–34.3)
MCHC RBC AUTO-ENTMCNC: 33.5 G/DL (ref 31.4–37.4)
MCV RBC AUTO: 85 FL (ref 82–98)
PLATELET # BLD AUTO: 35 THOUSANDS/UL (ref 149–390)
PMV BLD AUTO: 10.7 FL (ref 8.9–12.7)
RBC # BLD AUTO: 4.16 MILLION/UL (ref 3.88–5.62)
WBC # BLD AUTO: 1.8 THOUSAND/UL (ref 4.31–10.16)

## 2022-10-26 NOTE — TELEPHONE ENCOUNTER
[facility] [place] called with critical lab values  Yesterday's draw--  WBC 1 8   Platelets 81,435    Pt has an apt with Dr Jeanie Yip 11/1  Ok to wait for him to review tomorrow?

## 2022-10-27 LAB
HCV RNA SERPL NAA+PROBE-ACNC: NORMAL IU/ML
TEST INFORMATION: NORMAL

## 2022-11-01 ENCOUNTER — OFFICE VISIT (OUTPATIENT)
Dept: GASTROENTEROLOGY | Facility: CLINIC | Age: 52
End: 2022-11-01

## 2022-11-01 VITALS
BODY MASS INDEX: 30.22 KG/M2 | HEIGHT: 64 IN | DIASTOLIC BLOOD PRESSURE: 70 MMHG | WEIGHT: 177 LBS | SYSTOLIC BLOOD PRESSURE: 120 MMHG

## 2022-11-01 DIAGNOSIS — K74.60 CIRRHOSIS OF LIVER WITH ASCITES, UNSPECIFIED HEPATIC CIRRHOSIS TYPE (HCC): Primary | ICD-10-CM

## 2022-11-01 DIAGNOSIS — Z12.11 COLON CANCER SCREENING: ICD-10-CM

## 2022-11-01 DIAGNOSIS — R18.8 CIRRHOSIS OF LIVER WITH ASCITES, UNSPECIFIED HEPATIC CIRRHOSIS TYPE (HCC): Primary | ICD-10-CM

## 2022-11-01 NOTE — PATIENT INSTRUCTIONS
Abdominal ultrasound now  Repeat blood work and ultrasound in 6 months  Office visit after that    Contact me if anything changes year status

## 2022-11-01 NOTE — LETTER
November 1, 2022     Marti May, [address]  [address] [address] [facility] [address]    Patient: Gabe Kelly   YOB: 1970   Date of Visit: 11/1/2022       Dear Dr Vandana Hunter: Thank you for referring Onesimo Hansen to me for evaluation  Below are my notes for this consultation  If you have questions, please do not hesitate to call me  I look forward to following your patient along with you  Sincerely,        Luma Wagner MD        CC: No Recipients  Luma Wagner MD  11/1/2022  4:09 PM  Sign when Signing Visit  [facility] - Outpatient Follow-up Note  Gabe Kelly 54 y o  male MRN: [id number]  Encounter: [id number]    ASSESSMENT AND PLAN:      1  Cirrhosis of liver with ascites, unspecified hepatic cirrhosis type ([address] )  Secondary to alcohol and hepatitis C  S/P Epclusa finishing 12/2021  Last EGD October 20 21- for varices  Has antibodies against hepatitis a  Needs hepatitis-B vaccines  Previously worked up at [employer] by [employer]  Overdue for hepatoma screening ultrasound  Meld score 8    - abdominal ultrasound now  - continue Lasix and Aldactone  - CBC; Future 6 months  - AFP tumor marker; Future six-month  - Protime-INR; Future six-month  - Comprehensive metabolic panel; Future six-month  - US abdomen limited; Future six-month    2  Colon cancer screening  Last colonoscopy May 2022  Ten year recall    Followup Appointment:  6 months  ______________________________________________________________________    Chief Complaint   Patient presents with   • follow up     HPI:  Patient is seen back in the office today  From a liver standpoint he is doing well  Denies any ascites or lower extremity edema  He continues take the Aldactone or Lasix  Denies any GI bleeding  He denies encephalopathy  His weight is increasing but he attributes that to eating better    He got his blood work done the end of October  He has not yet got his ultrasounds done  Historical Information   Past Medical History:   Diagnosis Date   • Ascites    • Cirrhosis ([address] )    • Hepatitis C    • [place] teeth extracted      Past Surgical History:   Procedure Laterality Date   • IR BIOPSY BONE MARROW  11/12/2020   • IR PARACENTESIS  5/26/2020   • IR PARACENTESIS  6/3/2020   • IR PARACENTESIS  6/9/2020   • PARACENTESIS  5/29/2020        • UPPER GASTROINTESTINAL ENDOSCOPY       Social History     Substance and Sexual Activity   Alcohol Use Not Currently     Social History     Substance and Sexual Activity   Drug Use Not Currently    Comment: methadone for 20 years      Social History     Tobacco Use   Smoking Status Never Smoker   Smokeless Tobacco Never Used     Family History   Problem Relation Age of Onset   • Cirrhosis Maternal Grandmother         alcoholic cirrhosis   • Colon polyps Neg Hx    • Colon cancer Neg Hx          Current Outpatient Medications:   •  acetaminophen (TYLENOL) 325 mg tablet  •  furosemide (LASIX) 40 mg tablet  •  METHADONE HCL PO  •  spironolactone (ALDACTONE) 100 mg tablet  No Known Allergies  Reviewed medications and allergies and updated as indicated    PHYSICAL EXAM:    Blood pressure 120/70, height 5' 4" (1 626 m), weight 80 3 kg (177 lb)  Body mass index is 30 38 kg/m²  General Appearance: NAD, cooperative, alert  Eyes: Anicteric, PERRLA, EOMI  ENT:  Normocephalic, atraumatic, normal mucosa  Neck:  Supple, symmetrical, trachea midline  Resp:  Clear to auscultation bilaterally; no rales, rhonchi or wheezing; respirations unlabored   CV:  S1 S2, Regular rate and rhythm; no murmur, rub, or gallop  GI:  Soft, non-tender, non-distended; normal bowel sounds; no masses, splenomegaly  No ascites    Rectal: Deferred  Musculoskeletal: No cyanosis, clubbing or edema  Normal ROM    Skin:  No jaundice, rashes, or lesions   Heme/Lymph: No palpable cervical lymphadenopathy  Psych: Normal affect, good eye contact  Neuro: No gross deficits, AAOx3, no asterixis    Lab Results:   Lab Results   Component Value Date    WBC 1 80 (LL) 10/25/2022    HGB 11 9 (L) 10/25/2022    HCT 35 5 (L) 10/25/2022    MCV 85 10/25/2022    PLT 35 (LL) 10/25/2022     Lab Results   Component Value Date    K 4 2 10/25/2022     10/25/2022    CO2 30 10/25/2022    BUN 14 10/25/2022    CREATININE 0 81 10/25/2022    GLUF 120 (H) 10/25/2022    CALCIUM 8 9 10/25/2022    CORRECTEDCA 9 7 11/25/2020    AST 23 10/25/2022    ALT 25 10/25/2022    ALKPHOS 72 10/25/2022    EGFR 104 10/25/2022     Lab Results   Component Value Date    IRON 48 (L) 05/27/2020    TIBC 187 (L) 05/27/2020    FERRITIN 161 05/27/2020     Lab Results   Component Value Date    LIPASE 188 05/26/2020       Radiology Results:   No results found

## 2022-11-01 NOTE — PROGRESS NOTES
[facility] - Outpatient Follow-up Note  Liane Elkins 54 y o  male MRN: [id number]  Encounter: [id number]    ASSESSMENT AND PLAN:      1  Cirrhosis of liver with ascites, unspecified hepatic cirrhosis type ([facility] )  Secondary to alcohol and hepatitis C  S/P Epclusa finishing 12/2021  Last EGD October 20 21- for varices  Has antibodies against hepatitis a  Needs hepatitis-B vaccines  Previously worked up at [employer] by [employer]  Overdue for hepatoma screening ultrasound  Meld score 8    - abdominal ultrasound now  - continue Lasix and Aldactone  - CBC; Future 6 months  - AFP tumor marker; Future six-month  - Protime-INR; Future six-month  - Comprehensive metabolic panel; Future six-month  - US abdomen limited; Future six-month    2  Colon cancer screening  Last colonoscopy May 2022  Ten year recall    Followup Appointment:  6 months  ______________________________________________________________________    Chief Complaint   Patient presents with   • follow up     HPI:  Patient is seen back in the office today  From a liver standpoint he is doing well  Denies any ascites or lower extremity edema  He continues take the Aldactone or Lasix  Denies any GI bleeding  He denies encephalopathy  His weight is increasing but he attributes that to eating better  He got his blood work done the end of October  He has not yet got his ultrasounds done      Historical Information   Past Medical History:   Diagnosis Date   • Ascites    • Cirrhosis ([facility] )    • Hepatitis C    • [place] teeth extracted      Past Surgical History:   Procedure Laterality Date   • IR BIOPSY BONE MARROW  11/12/2020   • IR PARACENTESIS  5/26/2020   • IR PARACENTESIS  6/3/2020   • IR PARACENTESIS  6/9/2020   • PARACENTESIS  5/29/2020        • UPPER GASTROINTESTINAL ENDOSCOPY       Social History     Substance and Sexual Activity   Alcohol Use Not Currently     Social History     Substance and Sexual Activity Drug Use Not Currently    Comment: methadone for 20 years      Social History     Tobacco Use   Smoking Status Never Smoker   Smokeless Tobacco Never Used     Family History   Problem Relation Age of Onset   • Cirrhosis Maternal Grandmother         alcoholic cirrhosis   • Colon polyps Neg Hx    • Colon cancer Neg Hx          Current Outpatient Medications:   •  acetaminophen (TYLENOL) 325 mg tablet  •  furosemide (LASIX) 40 mg tablet  •  METHADONE HCL PO  •  spironolactone (ALDACTONE) 100 mg tablet  No Known Allergies  Reviewed medications and allergies and updated as indicated    PHYSICAL EXAM:    Blood pressure 120/70, height 5' 4" (1 626 m), weight 80 3 kg (177 lb)  Body mass index is 30 38 kg/m²  General Appearance: NAD, cooperative, alert  Eyes: Anicteric, PERRLA, EOMI  ENT:  Normocephalic, atraumatic, normal mucosa  Neck:  Supple, symmetrical, trachea midline  Resp:  Clear to auscultation bilaterally; no rales, rhonchi or wheezing; respirations unlabored   CV:  S1 S2, Regular rate and rhythm; no murmur, rub, or gallop  GI:  Soft, non-tender, non-distended; normal bowel sounds; no masses, splenomegaly  No ascites    Rectal: Deferred  Musculoskeletal: No cyanosis, clubbing or edema  Normal ROM    Skin:  No jaundice, rashes, or lesions   Heme/Lymph: No palpable cervical lymphadenopathy  Psych: Normal affect, good eye contact  Neuro: No gross deficits, AAOx3, no asterixis    Lab Results:   Lab Results   Component Value Date    WBC 1 80 (LL) 10/25/2022    HGB 11 9 (L) 10/25/2022    HCT 35 5 (L) 10/25/2022    MCV 85 10/25/2022    PLT 35 (LL) 10/25/2022     Lab Results   Component Value Date    K 4 2 10/25/2022     10/25/2022    CO2 30 10/25/2022    BUN 14 10/25/2022    CREATININE 0 81 10/25/2022    GLUF 120 (H) 10/25/2022    CALCIUM 8 9 10/25/2022    CORRECTEDCA 9 7 11/25/2020    AST 23 10/25/2022    ALT 25 10/25/2022    ALKPHOS 72 10/25/2022    EGFR 104 10/25/2022     Lab Results   Component Value Date IRON 48 (L) 05/27/2020    TIBC 187 (L) 05/27/2020    FERRITIN 161 05/27/2020     Lab Results   Component Value Date    LIPASE 188 05/26/2020       Radiology Results:   No results found

## 2023-03-09 DIAGNOSIS — R18.8 CIRRHOSIS OF LIVER WITH ASCITES, UNSPECIFIED HEPATIC CIRRHOSIS TYPE (HCC): ICD-10-CM

## 2023-03-09 DIAGNOSIS — K74.60 CIRRHOSIS OF LIVER WITH ASCITES, UNSPECIFIED HEPATIC CIRRHOSIS TYPE (HCC): ICD-10-CM

## 2023-03-09 RX ORDER — SPIRONOLACTONE 100 MG/1
TABLET, FILM COATED ORAL
Qty: 90 TABLET | Refills: 1 | Status: SHIPPED | OUTPATIENT
Start: 2023-03-09

## 2023-03-19 ENCOUNTER — HOSPITAL ENCOUNTER (OUTPATIENT)
Dept: ULTRASOUND IMAGING | Facility: HOSPITAL | Age: 53
Discharge: HOME | End: 2023-03-19
Attending: INTERNAL MEDICINE

## 2023-03-19 DIAGNOSIS — R18.8 CIRRHOSIS OF LIVER WITH ASCITES, UNSPECIFIED HEPATIC CIRRHOSIS TYPE (HCC): ICD-10-CM

## 2023-03-19 DIAGNOSIS — K74.60 CIRRHOSIS OF LIVER WITH ASCITES, UNSPECIFIED HEPATIC CIRRHOSIS TYPE (HCC): ICD-10-CM

## 2023-06-01 ENCOUNTER — APPOINTMENT (OUTPATIENT)
Dept: LAB | Facility: HOSPITAL | Age: 53
End: 2023-06-01
Payer: COMMERCIAL

## 2023-06-01 ENCOUNTER — TELEPHONE (OUTPATIENT)
Dept: OTHER | Facility: OTHER | Age: 53
End: 2023-06-01

## 2023-06-01 DIAGNOSIS — K74.60 CIRRHOSIS OF LIVER WITH ASCITES, UNSPECIFIED HEPATIC CIRRHOSIS TYPE (HCC): ICD-10-CM

## 2023-06-01 DIAGNOSIS — R18.8 CIRRHOSIS OF LIVER WITH ASCITES, UNSPECIFIED HEPATIC CIRRHOSIS TYPE (HCC): ICD-10-CM

## 2023-06-01 LAB
AFP-TM SERPL-MCNC: 1.29 NG/ML (ref 0–9)
ALBUMIN SERPL BCP-MCNC: 3.7 G/DL (ref 3.5–5)
ALP SERPL-CCNC: 74 U/L (ref 46–116)
ALT SERPL W P-5'-P-CCNC: 24 U/L (ref 12–78)
ANION GAP SERPL CALCULATED.3IONS-SCNC: 2 MMOL/L (ref 4–13)
AST SERPL W P-5'-P-CCNC: 23 U/L (ref 5–45)
BILIRUB SERPL-MCNC: 0.9 MG/DL (ref 0.2–1)
BUN SERPL-MCNC: 15 MG/DL (ref 5–25)
CALCIUM SERPL-MCNC: 8.8 MG/DL (ref 8.3–10.1)
CHLORIDE SERPL-SCNC: 104 MMOL/L (ref 96–108)
CO2 SERPL-SCNC: 30 MMOL/L (ref 21–32)
CREAT SERPL-MCNC: 0.78 MG/DL (ref 0.6–1.3)
ERYTHROCYTE [DISTWIDTH] IN BLOOD BY AUTOMATED COUNT: 14.6 % (ref 11.6–15.1)
GFR SERPL CREATININE-BSD FRML MDRD: 105 ML/MIN/1.73SQ M
GLUCOSE P FAST SERPL-MCNC: 94 MG/DL (ref 65–99)
HCT VFR BLD AUTO: 37.1 % (ref 36.5–49.3)
HGB BLD-MCNC: 12.5 G/DL (ref 12–17)
INR PPP: 1.28 (ref 0.84–1.19)
MCH RBC QN AUTO: 28.6 PG (ref 26.8–34.3)
MCHC RBC AUTO-ENTMCNC: 33.7 G/DL (ref 31.4–37.4)
MCV RBC AUTO: 85 FL (ref 82–98)
PLATELET # BLD AUTO: 38 THOUSANDS/UL (ref 149–390)
PMV BLD AUTO: 10 FL (ref 8.9–12.7)
POTASSIUM SERPL-SCNC: 4.1 MMOL/L (ref 3.5–5.3)
PROT SERPL-MCNC: 7.5 G/DL (ref 6.4–8.4)
PROTHROMBIN TIME: 16.3 SECONDS (ref 11.6–14.5)
RBC # BLD AUTO: 4.37 MILLION/UL (ref 3.88–5.62)
SODIUM SERPL-SCNC: 136 MMOL/L (ref 135–147)
WBC # BLD AUTO: 1.82 THOUSAND/UL (ref 4.31–10.16)

## 2023-06-01 PROCEDURE — 80053 COMPREHEN METABOLIC PANEL: CPT

## 2023-06-01 PROCEDURE — 85027 COMPLETE CBC AUTOMATED: CPT

## 2023-06-01 PROCEDURE — 82105 ALPHA-FETOPROTEIN SERUM: CPT

## 2023-06-01 PROCEDURE — 36415 COLL VENOUS BLD VENIPUNCTURE: CPT

## 2023-06-01 PROCEDURE — 85610 PROTHROMBIN TIME: CPT

## 2023-06-01 NOTE — TELEPHONE ENCOUNTER
Lab Result: Plt 38,000 WBC 1 82   Date/Time Drawn: 6/1/23 0710   Ordering Provider: Dr Gomez Number Name: Candice Boss       The following critical/stat result was read back to the lab as stated above and [address] to the on-call provider  The provider confirmed receipt of the message

## 2023-06-19 ENCOUNTER — OFFICE VISIT (OUTPATIENT)
Dept: GASTROENTEROLOGY | Facility: CLINIC | Age: 53
End: 2023-06-19
Payer: COMMERCIAL

## 2023-06-19 VITALS
HEIGHT: 64 IN | BODY MASS INDEX: 30.42 KG/M2 | SYSTOLIC BLOOD PRESSURE: 152 MMHG | DIASTOLIC BLOOD PRESSURE: 85 MMHG | WEIGHT: 178.2 LBS

## 2023-06-19 DIAGNOSIS — R18.8 CIRRHOSIS OF LIVER WITH ASCITES, UNSPECIFIED HEPATIC CIRRHOSIS TYPE (HCC): Primary | ICD-10-CM

## 2023-06-19 DIAGNOSIS — B18.2 CHRONIC HEPATITIS C WITHOUT HEPATIC COMA (HCC): ICD-10-CM

## 2023-06-19 DIAGNOSIS — K74.60 CIRRHOSIS OF LIVER WITH ASCITES, UNSPECIFIED HEPATIC CIRRHOSIS TYPE (HCC): Primary | ICD-10-CM

## 2023-06-19 PROCEDURE — 99213 OFFICE O/P EST LOW 20 MIN: CPT | Performed by: INTERNAL MEDICINE

## 2023-06-19 RX ORDER — MULTIVITAMIN
1 TABLET ORAL DAILY
COMMUNITY

## 2023-06-19 NOTE — PROGRESS NOTES
[facility] - Outpatient Follow-up Note  Rossana Marcos 50 y o  male MRN: [id number]  Encounter: [id number]    ASSESSMENT AND PLAN:      1  Cirrhosis of liver with ascites, unspecified hepatic cirrhosis type ([facility] )  Decompensated cirrhosis secondary to alcohol and hepatitis C  Treated with Epclusa in the past   Is no longer drinking alcohol  Previously worked up at [employer] but no longer needs to follow-up since he is doing so well  Current MELD score is 9  - AFP tumor marker; Future  - Protime-INR; Future  - Comprehensive metabolic panel; Future  - CBC; Future  - US abdomen limited; Future  -EGD for variceal screening later this year  -Continue to encourage alcohol abstinence      2  Chronic hepatitis C without hepatic coma [facility])  Status post Epclusa 2021      Followup Appointment: 4 months  ______________________________________________________________________    Chief Complaint   Patient presents with   • Cirrhosis     Follow up labs and US     HPI: The patient is seen back in the office today  He has a history of decompensated cirrhosis secondary to alcohol and hepatitis C with a history of ascites  From a liver standpoint he is doing well  He denies any ascites or lower extremity edema  He continues to take Aldactone and Lasix  He denies any hepatic encephalopathy although he does sometimes report what he thinks is COVID brain fog  He denies any GI bleeding  His last ultrasound was in March was negative for hepatoma  His last EGD was in 2021 and was negative for varices  He completed his course of treatment for hepatitis C  He is no longer drinking alcohol  He has not followed up with [facility] because he has been doing so well from a liver standpoint    He does admit to easy bruising which she attributes to his low platelet count    Historical Information   Past Medical History:   Diagnosis Date   • Ascites    • Cirrhosis ([facility] )    • Hepatitis C    • [place] teeth "extracted      Past Surgical History:   Procedure Laterality Date   • IR BIOPSY BONE MARROW  11/12/2020   • IR PARACENTESIS  5/26/2020   • IR PARACENTESIS  6/3/2020   • IR PARACENTESIS  6/9/2020   • PARACENTESIS  5/29/2020        • UPPER GASTROINTESTINAL ENDOSCOPY       Social History     Substance and Sexual Activity   Alcohol Use Not Currently     Social History     Substance and Sexual Activity   Drug Use Not Currently    Comment: methadone for 20 years      Social History     Tobacco Use   Smoking Status Never   Smokeless Tobacco Never     Family History   Problem Relation Age of Onset   • Cirrhosis Maternal Grandmother         alcoholic cirrhosis   • Colon polyps Neg Hx    • Colon cancer Neg Hx          Current Outpatient Medications:   •  acetaminophen (TYLENOL) 325 mg tablet  •  Ascorbic Acid (VITAMIN C PO)  •  Cholecalciferol (VITAMIN D3 PO)  •  furosemide (LASIX) 40 mg tablet  •  METHADONE HCL PO  •  milk thistle 175 MG tablet  •  Multiple Vitamin (multivitamin) tablet  •  spironolactone (ALDACTONE) 100 mg tablet  No Known Allergies  Reviewed medications and allergies and updated as indicated    PHYSICAL EXAM:    Blood pressure 152/85, height 5' 4\" (1 626 m), weight 80 8 kg (178 lb 3 2 oz)  Body mass index is 30 59 kg/m²  General Appearance: NAD, cooperative, alert  Eyes: Anicteric, PERRLA, EOMI  ENT:  Normocephalic, atraumatic, normal mucosa  Neck:  Supple, symmetrical, trachea midline  Resp:  Clear to auscultation bilaterally; no rales, rhonchi or wheezing; respirations unlabored   CV:  S1 S2, Regular rate and rhythm; no murmur, rub, or gallop  GI:  Soft, non-tender, non-distended; normal bowel sounds; no masses, no organomegaly   Rectal: Deferred  Musculoskeletal: No cyanosis, clubbing or edema  Normal ROM  Skin:  No jaundice, rashes, or lesions    Multiple tattoos  Heme/Lymph: No palpable cervical lymphadenopathy  Psych: Normal affect, good eye contact  Neuro: No gross deficits, AAOx3, no " [place]    Lab Results:   Lab Results   Component Value Date    WBC 1 82 (LL) 06/01/2023    HGB 12 5 06/01/2023    HCT 37 1 06/01/2023    MCV 85 06/01/2023    PLT 38 (LL) 06/01/2023     Lab Results   Component Value Date    K 4 1 06/01/2023     06/01/2023    CO2 30 06/01/2023    BUN 15 06/01/2023    CREATININE 0 78 06/01/2023    GLUF 94 06/01/2023    CALCIUM 8 8 06/01/2023    CORRECTEDCA 9 7 11/25/2020    AST 23 06/01/2023    ALT 24 06/01/2023    ALKPHOS 74 06/01/2023    EGFR 105 06/01/2023     Lab Results   Component Value Date    IRON 48 (L) 05/27/2020    TIBC 187 (L) 05/27/2020    FERRITIN 161 05/27/2020     Lab Results   Component Value Date    LIPASE 188 05/26/2020       Radiology Results:   No results found

## 2023-06-19 NOTE — LETTER
June 19, 2023     Melvi Medrano, [address]  [address] [facility] [address] [address]    Patient: Tyrell Garcia   YOB: 1970   Date of Visit: 6/19/2023       Dear Dr Toma Gary: Thank you for referring Rahat Santos to me for evaluation  Below are my notes for this consultation  If you have questions, please do not hesitate to call me  I look forward to following your patient along with you  Sincerely,        Margarita Lopez MD        CC: No Recipients    Margarita Lopez MD  6/19/2023  4:54 PM  Incomplete  [address] [facility] - Outpatient Follow-up Note  Tyrell Garcia 50 y o  male MRN: [id number]  Encounter: [id number]    ASSESSMENT AND PLAN:      1  Cirrhosis of liver with ascites, unspecified hepatic cirrhosis type ([address] )  Decompensated cirrhosis secondary to alcohol and hepatitis C  Treated with Epclusa in the past   Is no longer drinking alcohol  Previously worked up at [employer] but no longer needs to follow-up since he is doing so well  Current MELD score is 9  - AFP tumor marker; Future  - Protime-INR; Future  - Comprehensive metabolic panel; Future  - CBC; Future  - US abdomen limited; Future  -EGD for variceal screening later this year  -Continue to encourage alcohol abstinence      2  Chronic hepatitis C without hepatic coma [facility])  Status post Epclusa 2021      Followup Appointment: 4 months  ______________________________________________________________________    Chief Complaint   Patient presents with   • Cirrhosis     Follow up labs and US     HPI: The patient is seen back in the office today  He has a history of decompensated cirrhosis secondary to alcohol and hepatitis C with a history of ascites  From a liver standpoint he is doing well  He denies any ascites or lower extremity edema  He continues to take Aldactone and Lasix    He denies any hepatic encephalopathy although he does sometimes report what he "thinks is COVID brain fog  He denies any GI bleeding  His last ultrasound was in March was negative for hepatoma  His last EGD was in 2021 and was negative for varices  He completed his course of treatment for hepatitis C  He is no longer drinking alcohol  He has not followed up with [facility] because he has been doing so well from a liver standpoint  He does admit to easy bruising which she attributes to his low platelet count    Historical Information   Past Medical History:   Diagnosis Date   • Ascites    • Cirrhosis ([address] )    • Hepatitis C    • [place] teeth extracted      Past Surgical History:   Procedure Laterality Date   • IR BIOPSY BONE MARROW  11/12/2020   • IR PARACENTESIS  5/26/2020   • IR PARACENTESIS  6/3/2020   • IR PARACENTESIS  6/9/2020   • PARACENTESIS  5/29/2020        • UPPER GASTROINTESTINAL ENDOSCOPY       Social History     Substance and Sexual Activity   Alcohol Use Not Currently     Social History     Substance and Sexual Activity   Drug Use Not Currently    Comment: methadone for 20 years      Social History     Tobacco Use   Smoking Status Never   Smokeless Tobacco Never     Family History   Problem Relation Age of Onset   • Cirrhosis Maternal Grandmother         alcoholic cirrhosis   • Colon polyps Neg Hx    • Colon cancer Neg Hx          Current Outpatient Medications:   •  acetaminophen (TYLENOL) 325 mg tablet  •  Ascorbic Acid (VITAMIN C PO)  •  Cholecalciferol (VITAMIN D3 PO)  •  furosemide (LASIX) 40 mg tablet  •  METHADONE HCL PO  •  milk thistle 175 MG tablet  •  Multiple Vitamin (multivitamin) tablet  •  spironolactone (ALDACTONE) 100 mg tablet  No Known Allergies  Reviewed medications and allergies and updated as indicated    PHYSICAL EXAM:    Blood pressure 152/85, height 5' 4\" (1 626 m), weight 80 8 kg (178 lb 3 2 oz)  Body mass index is 30 59 kg/m²  General Appearance: NAD, cooperative, alert  Eyes: Anicteric, PERRLA, EOMI  ENT:  Normocephalic, atraumatic, normal mucosa    " Neck:  Supple, symmetrical, trachea midline  Resp:  Clear to auscultation bilaterally; no rales, rhonchi or wheezing; respirations unlabored   CV:  S1 S2, Regular rate and rhythm; no murmur, rub, or gallop  GI:  Soft, non-tender, non-distended; normal bowel sounds; no masses, no organomegaly   Rectal: Deferred  Musculoskeletal: No cyanosis, clubbing or edema  Normal ROM  Skin:  No jaundice, rashes, or lesions  Multiple tattoos  Heme/Lymph: No palpable cervical lymphadenopathy  Psych: Normal affect, good eye contact  Neuro: No gross deficits, AAOx3, no asterixis    Lab Results:   Lab Results   Component Value Date    WBC 1 82 (LL) 06/01/2023    HGB 12 5 06/01/2023    HCT 37 1 06/01/2023    MCV 85 06/01/2023    PLT 38 (LL) 06/01/2023     Lab Results   Component Value Date    K 4 1 06/01/2023     06/01/2023    CO2 30 06/01/2023    BUN 15 06/01/2023    CREATININE 0 78 06/01/2023    GLUF 94 06/01/2023    CALCIUM 8 8 06/01/2023    CORRECTEDCA 9 7 11/25/2020    AST 23 06/01/2023    ALT 24 06/01/2023    ALKPHOS 74 06/01/2023    EGFR 105 06/01/2023     Lab Results   Component Value Date    IRON 48 (L) 05/27/2020    TIBC 187 (L) 05/27/2020    FERRITIN 161 05/27/2020     Lab Results   Component Value Date    LIPASE 188 05/26/2020       Radiology Results:   No results found

## 2023-09-15 DIAGNOSIS — R18.8 CIRRHOSIS OF LIVER WITH ASCITES, UNSPECIFIED HEPATIC CIRRHOSIS TYPE: ICD-10-CM

## 2023-09-15 DIAGNOSIS — K74.60 CIRRHOSIS OF LIVER WITH ASCITES, UNSPECIFIED HEPATIC CIRRHOSIS TYPE: ICD-10-CM

## 2023-09-15 RX ORDER — SPIRONOLACTONE 100 MG/1
TABLET, FILM COATED ORAL
Qty: 90 TABLET | Refills: 1 | Status: SHIPPED | OUTPATIENT
Start: 2023-09-15

## 2023-11-06 ENCOUNTER — TELEPHONE (OUTPATIENT)
Age: 53
End: 2023-11-06

## 2023-11-06 ENCOUNTER — APPOINTMENT (OUTPATIENT)
Dept: LAB | Facility: HOSPITAL | Age: 53
End: 2023-11-06
Payer: COMMERCIAL

## 2023-11-06 DIAGNOSIS — K74.60 CIRRHOSIS OF LIVER WITH ASCITES, UNSPECIFIED HEPATIC CIRRHOSIS TYPE: ICD-10-CM

## 2023-11-06 DIAGNOSIS — R18.8 CIRRHOSIS OF LIVER WITH ASCITES, UNSPECIFIED HEPATIC CIRRHOSIS TYPE: ICD-10-CM

## 2023-11-06 LAB
AFP-TM SERPL-MCNC: 1.27 NG/ML (ref 0–9)
ALBUMIN SERPL BCP-MCNC: 4.4 G/DL (ref 3.5–5)
ALP SERPL-CCNC: 66 U/L (ref 34–104)
ALT SERPL W P-5'-P-CCNC: 17 U/L (ref 7–52)
ANION GAP SERPL CALCULATED.3IONS-SCNC: 5 MMOL/L
AST SERPL W P-5'-P-CCNC: 21 U/L (ref 13–39)
BILIRUB SERPL-MCNC: 0.87 MG/DL (ref 0.2–1)
BUN SERPL-MCNC: 12 MG/DL (ref 5–25)
CALCIUM SERPL-MCNC: 9 MG/DL (ref 8.4–10.2)
CHLORIDE SERPL-SCNC: 100 MMOL/L (ref 96–108)
CO2 SERPL-SCNC: 35 MMOL/L (ref 21–32)
CREAT SERPL-MCNC: 0.81 MG/DL (ref 0.6–1.3)
ERYTHROCYTE [DISTWIDTH] IN BLOOD BY AUTOMATED COUNT: 14.6 % (ref 11.6–15.1)
GFR SERPL CREATININE-BSD FRML MDRD: 103 ML/MIN/1.73SQ M
GLUCOSE P FAST SERPL-MCNC: 137 MG/DL (ref 65–99)
HCT VFR BLD AUTO: 39.8 % (ref 36.5–49.3)
HGB BLD-MCNC: 13 G/DL (ref 12–17)
INR PPP: 1.21 (ref 0.84–1.19)
MCH RBC QN AUTO: 28.3 PG (ref 26.8–34.3)
MCHC RBC AUTO-ENTMCNC: 32.7 G/DL (ref 31.4–37.4)
MCV RBC AUTO: 87 FL (ref 82–98)
PLATELET # BLD AUTO: 35 THOUSANDS/UL (ref 149–390)
PMV BLD AUTO: 10.5 FL (ref 8.9–12.7)
POTASSIUM SERPL-SCNC: 4.1 MMOL/L (ref 3.5–5.3)
PROT SERPL-MCNC: 7.7 G/DL (ref 6.4–8.4)
PROTHROMBIN TIME: 15.2 SECONDS (ref 11.6–14.5)
RBC # BLD AUTO: 4.59 MILLION/UL (ref 3.88–5.62)
SODIUM SERPL-SCNC: 140 MMOL/L (ref 135–147)
WBC # BLD AUTO: 1.88 THOUSAND/UL (ref 4.31–10.16)

## 2023-11-06 PROCEDURE — 82105 ALPHA-FETOPROTEIN SERUM: CPT

## 2023-11-06 PROCEDURE — 85027 COMPLETE CBC AUTOMATED: CPT

## 2023-11-06 PROCEDURE — 36415 COLL VENOUS BLD VENIPUNCTURE: CPT

## 2023-11-06 PROCEDURE — 80053 COMPREHEN METABOLIC PANEL: CPT

## 2023-11-06 PROCEDURE — 85610 PROTHROMBIN TIME: CPT

## 2023-11-06 NOTE — TELEPHONE ENCOUNTER
Patients GI provider:  Dr. Hilary Cobos    Number to return call: [phone]    Reason for call: Love Van from [facility] laboratory calling with critical results on patients CBC. WBC is 1.88 and Platelets are 77,352.     Scheduled procedure/appointment date if applicable: Apt 10/26/78

## 2023-11-10 ENCOUNTER — TELEPHONE (OUTPATIENT)
Dept: GASTROENTEROLOGY | Facility: CLINIC | Age: 53
End: 2023-11-10

## 2023-11-10 ENCOUNTER — OFFICE VISIT (OUTPATIENT)
Dept: GASTROENTEROLOGY | Facility: CLINIC | Age: 53
End: 2023-11-10
Payer: COMMERCIAL

## 2023-11-10 VITALS
SYSTOLIC BLOOD PRESSURE: 132 MMHG | BODY MASS INDEX: 29.19 KG/M2 | DIASTOLIC BLOOD PRESSURE: 65 MMHG | WEIGHT: 171 LBS | HEIGHT: 64 IN

## 2023-11-10 DIAGNOSIS — Z12.11 COLON CANCER SCREENING: ICD-10-CM

## 2023-11-10 DIAGNOSIS — B18.2 CHRONIC HEPATITIS C WITHOUT HEPATIC COMA (HCC): Primary | ICD-10-CM

## 2023-11-10 DIAGNOSIS — R18.8 CIRRHOSIS OF LIVER WITH ASCITES, UNSPECIFIED HEPATIC CIRRHOSIS TYPE: ICD-10-CM

## 2023-11-10 DIAGNOSIS — K74.60 CIRRHOSIS OF LIVER WITH ASCITES, UNSPECIFIED HEPATIC CIRRHOSIS TYPE: ICD-10-CM

## 2023-11-10 PROCEDURE — 99213 OFFICE O/P EST LOW 20 MIN: CPT | Performed by: INTERNAL MEDICINE

## 2023-11-10 NOTE — LETTER
November 10, 2023     Shweta Garcia, [address] [address]    Patient: Kiah Collins   YOB: 1970   Date of Visit: 11/10/2023       Dear Dr. Raina Clark: Thank you for referring Ellis Yusuf to me for evaluation. Below are my notes for this consultation. If you have questions, please do not hesitate to call me. I look forward to following your patient along with you. Sincerely,        Charlie Gaona. Vamshi Dent MD        CC: No Recipients    Charlie Gaona. Vamshi Dent MD  11/10/2023  8:13 AM  Sign when Signing Visit  [facility] - Outpatient Follow-up Note  Kiah Collins 50 y.o. male MRN: [id number]  Encounter: [id number]    ASSESSMENT AND PLAN:      1. Cirrhosis of liver with ascites, unspecified hepatic cirrhosis type   Decompensated cirrhosis secondary to hepatitis C and alcohol with a history of ascites. Hepatitis C treated in the past.  Abstinent from alcohol for many years. Currently MELD score of 9. Blood work earlier this month stable. Has not yet gotten his ultrasound done. He is due for follow-up EGD for variceal screening. No evidence of hepatic encephalopathy in history or exam.  Previously evaluated by Dr. Evelyn Reyes at [address] Future  -Continue Aldactone and Lasix  -Abdominal ultrasound as ordered  -Continue alcohol abstinence  -Repeat blood work and ultrasound in 6 months    2. Chronic hepatitis C without hepatic coma (HCC)  S/P Epclusa. Viral load October 2022 negative    3. Colon cancer screening  Last colonoscopy May 2022. Due for follow-up 2032      Followup Appointment: 6 months  ______________________________________________________________________    Chief Complaint   Patient presents with   • Cirrhosis     6 month follow up       HPI: The patient is seen back in the office today.   He has a history of decompensated cirrhosis secondary to hepatitis C with ascites. He was initially evaluated at [facility] but following treatment of his hepatitis C has stabilized and has done well. He continues to take Lasix and Aldactone. He denies any ascites or lower extremity edema. Denies any hepatic encephalopathy. His blood work is stable. He denies any GI bleeding. Historical Information  Past Medical History:   Diagnosis Date   • Ascites    • Cirrhosis ([address])    • Hepatitis C    • [place] teeth extracted      Past Surgical History:   Procedure Laterality Date   • IR BIOPSY BONE MARROW  11/12/2020   • IR PARACENTESIS  5/26/2020   • IR PARACENTESIS  6/3/2020   • IR PARACENTESIS  6/9/2020   • PARACENTESIS  5/29/2020        • UPPER GASTROINTESTINAL ENDOSCOPY       Social History     Substance and Sexual Activity   Alcohol Use Not Currently     Social History     Substance and Sexual Activity   Drug Use Not Currently    Comment: methadone for 20 years      Social History     Tobacco Use   Smoking Status Never   Smokeless Tobacco Never     Family History   Problem Relation Age of Onset   • Cirrhosis Maternal Grandmother         alcoholic cirrhosis   • Colon polyps Neg Hx    • Colon cancer Neg Hx          Current Outpatient Medications:   •  acetaminophen (TYLENOL) 325 mg tablet  •  Ascorbic Acid (VITAMIN C PO)  •  Cholecalciferol (VITAMIN D3 PO)  •  furosemide (LASIX) 40 mg tablet  •  METHADONE HCL PO  •  milk thistle 175 MG tablet  •  Multiple Vitamin (multivitamin) tablet  •  spironolactone (ALDACTONE) 100 mg tablet  No Known Allergies  Reviewed medications and allergies and updated as indicated    PHYSICAL EXAM:    Blood pressure 132/65, height 5' 4" (1.626 m), weight 77.6 kg (171 lb). Body mass index is 29.35 kg/m². General Appearance: NAD, cooperative, alert  Eyes: Anicteric, PERRLA, EOMI  ENT:  Normocephalic, atraumatic, normal mucosa.     Neck:  Supple, symmetrical, trachea midline  Resp:  Clear to auscultation bilaterally; no rales, rhonchi or wheezing; respirations unlabored   CV:  S1 S2, Regular rate and rhythm; no murmur, rub, or gallop. GI:  Soft, non-tender, non-distended; normal bowel sounds; no masses, no organomegaly. No ascites  Rectal: Deferred  Musculoskeletal: No cyanosis, clubbing or edema. Normal ROM. Skin:  No jaundice, rashes, or lesions. Multiple tattoos  Heme/Lymph: No palpable cervical lymphadenopathy  Psych: Normal affect, good eye contact  Neuro: No gross deficits, AAOx3, no asterixis    Lab Results:   Lab Results   Component Value Date    WBC 1.88 (LL) 11/06/2023    HGB 13.0 11/06/2023    HCT 39.8 11/06/2023    MCV 87 11/06/2023    PLT 35 (LL) 11/06/2023     Lab Results   Component Value Date    K 4.1 11/06/2023     11/06/2023    CO2 35 (H) 11/06/2023    BUN 12 11/06/2023    CREATININE 0.81 11/06/2023    GLUF 137 (H) 11/06/2023    CALCIUM 9.0 11/06/2023    CORRECTEDCA 9.7 11/25/2020    AST 21 11/06/2023    ALT 17 11/06/2023    ALKPHOS 66 11/06/2023    EGFR 103 11/06/2023     Lab Results   Component Value Date    IRON 48 (L) 05/27/2020    TIBC 187 (L) 05/27/2020    FERRITIN 161 05/27/2020     Lab Results   Component Value Date    LIPASE 188 05/26/2020       Radiology Results:   No results found.

## 2023-11-10 NOTE — TELEPHONE ENCOUNTER
Scheduled date of EGD(as of today): 1/11/24  Physician performing EGD: Dr. Robert Cloud  Location of EGD: [facility]  Instructions reviewed with patient by: Shania Clancy  Clearances: none

## 2023-11-10 NOTE — PROGRESS NOTES
[facility] - Outpatient Follow-up Note  Jaguar Alvarenga 50 y.o. male MRN: [id number]  Encounter: [id number]    ASSESSMENT AND PLAN:      1. Cirrhosis of liver with ascites, unspecified hepatic cirrhosis type   Decompensated cirrhosis secondary to hepatitis C and alcohol with a history of ascites. Hepatitis C treated in the past.  Abstinent from alcohol for many years. Currently MELD score of 9. Blood work earlier this month stable. Has not yet gotten his ultrasound done. He is due for follow-up EGD for variceal screening. No evidence of hepatic encephalopathy in history or exam.  Previously evaluated by Dr. Gómez Gomez at [address] Future  -Continue Aldactone and Lasix  -Abdominal ultrasound as ordered  -Continue alcohol abstinence  -Repeat blood work and ultrasound in 6 months    2. Chronic hepatitis C without hepatic coma (HCC)  S/P Epclusa. Viral load October 2022 negative    3. Colon cancer screening  Last colonoscopy May 2022. Due for follow-up 2032      Followup Appointment: 6 months  ______________________________________________________________________    Chief Complaint   Patient presents with    Cirrhosis     6 month follow up       HPI: The patient is seen back in the office today. He has a history of decompensated cirrhosis secondary to hepatitis C with ascites. He was initially evaluated [facility] but following treatment of his hepatitis C has stabilized and has done well. He continues to take Lasix and Aldactone. He denies any ascites or lower extremity edema. Denies any hepatic encephalopathy. His blood work is stable. He denies any GI bleeding.     Historical Information   Past Medical History:   Diagnosis Date    Ascites     Cirrhosis ([address])     Hepatitis C     [place] teeth extracted      Past Surgical History:   Procedure Laterality Date    IR BIOPSY BONE MARROW  11/12/2020    IR PARACENTESIS  5/26/2020    IR PARACENTESIS 6/3/2020    IR PARACENTESIS  6/9/2020    PARACENTESIS  5/29/2020         UPPER GASTROINTESTINAL ENDOSCOPY       Social History     Substance and Sexual Activity   Alcohol Use Not Currently     Social History     Substance and Sexual Activity   Drug Use Not Currently    Comment: methadone for 20 years      Social History     Tobacco Use   Smoking Status Never   Smokeless Tobacco Never     Family History   Problem Relation Age of Onset    Cirrhosis Maternal Grandmother         alcoholic cirrhosis    Colon polyps Neg Hx     Colon cancer Neg Hx          Current Outpatient Medications:     acetaminophen (TYLENOL) 325 mg tablet    Ascorbic Acid (VITAMIN C PO)    Cholecalciferol (VITAMIN D3 PO)    furosemide (LASIX) 40 mg tablet    METHADONE HCL PO    milk thistle 175 MG tablet    Multiple Vitamin (multivitamin) tablet    spironolactone (ALDACTONE) 100 mg tablet  No Known Allergies  Reviewed medications and allergies and updated as indicated    PHYSICAL EXAM:    Blood pressure 132/65, height 5' 4" (1.626 m), weight 77.6 kg (171 lb). Body mass index is 29.35 kg/m². General Appearance: NAD, cooperative, alert  Eyes: Anicteric, PERRLA, EOMI  ENT:  Normocephalic, atraumatic, normal mucosa. Neck:  Supple, symmetrical, trachea midline  Resp:  Clear to auscultation bilaterally; no rales, rhonchi or wheezing; respirations unlabored   CV:  S1 S2, Regular rate and rhythm; no murmur, rub, or gallop. GI:  Soft, non-tender, non-distended; normal bowel sounds; no masses, no organomegaly. No ascites  Rectal: Deferred  Musculoskeletal: No cyanosis, clubbing or edema. Normal ROM. Skin:  No jaundice, rashes, or lesions.   Multiple tattoos  Heme/Lymph: No palpable cervical lymphadenopathy  Psych: Normal affect, good eye contact  Neuro: No gross deficits, AAOx3, no asterixis    Lab Results:   Lab Results   Component Value Date    WBC 1.88 (LL) 11/06/2023    HGB 13.0 11/06/2023    HCT 39.8 11/06/2023    MCV 87 11/06/2023    PLT 35 (LL) 11/06/2023     Lab Results   Component Value Date    K 4.1 11/06/2023     11/06/2023    CO2 35 (H) 11/06/2023    BUN 12 11/06/2023    CREATININE 0.81 11/06/2023    GLUF 137 (H) 11/06/2023    CALCIUM 9.0 11/06/2023    CORRECTEDCA 9.7 11/25/2020    AST 21 11/06/2023    ALT 17 11/06/2023    ALKPHOS 66 11/06/2023    EGFR 103 11/06/2023     Lab Results   Component Value Date    IRON 48 (L) 05/27/2020    TIBC 187 (L) 05/27/2020    FERRITIN 161 05/27/2020     Lab Results   Component Value Date    LIPASE 188 05/26/2020       Radiology Results:   No results found.

## 2023-11-10 NOTE — PATIENT INSTRUCTIONS
Get ultrasound done. EGD. Follow-up office visit 6 months. Get ultrasound and blood work prior to that visit.   Continue Aldactone and Lasix

## 2023-12-04 DIAGNOSIS — R18.8 CIRRHOSIS OF LIVER WITH ASCITES, UNSPECIFIED HEPATIC CIRRHOSIS TYPE: ICD-10-CM

## 2023-12-04 DIAGNOSIS — K74.60 CIRRHOSIS OF LIVER WITH ASCITES, UNSPECIFIED HEPATIC CIRRHOSIS TYPE: ICD-10-CM

## 2023-12-04 RX ORDER — FUROSEMIDE 40 MG/1
TABLET ORAL
Qty: 180 TABLET | Refills: 1 | Status: SHIPPED | OUTPATIENT
Start: 2023-12-04

## 2023-12-28 ENCOUNTER — ANESTHESIA (OUTPATIENT)
Dept: ANESTHESIOLOGY | Facility: AMBULATORY SURGERY CENTER | Age: 53
End: 2023-12-28

## 2023-12-28 ENCOUNTER — ANESTHESIA EVENT (OUTPATIENT)
Dept: ANESTHESIOLOGY | Facility: AMBULATORY SURGERY CENTER | Age: 53
End: 2023-12-28

## 2024-01-10 ENCOUNTER — ANESTHESIA EVENT (OUTPATIENT)
Dept: ANESTHESIOLOGY | Facility: AMBULATORY SURGERY CENTER | Age: 54
End: 2024-01-10

## 2024-01-10 ENCOUNTER — ANESTHESIA (OUTPATIENT)
Dept: ANESTHESIOLOGY | Facility: AMBULATORY SURGERY CENTER | Age: 54
End: 2024-01-10

## 2024-01-11 ENCOUNTER — HOSPITAL ENCOUNTER (OUTPATIENT)
Dept: GASTROENTEROLOGY | Facility: AMBULATORY SURGERY CENTER | Age: 54
Discharge: HOME | End: 2024-01-11
Payer: COMMERCIAL

## 2024-01-11 ENCOUNTER — ANESTHESIA (OUTPATIENT)
Dept: GASTROENTEROLOGY | Facility: AMBULATORY SURGERY CENTER | Age: 54
End: 2024-01-11

## 2024-01-11 ENCOUNTER — ANESTHESIA EVENT (OUTPATIENT)
Dept: GASTROENTEROLOGY | Facility: AMBULATORY SURGERY CENTER | Age: 54
End: 2024-01-11

## 2024-01-11 VITALS
SYSTOLIC BLOOD PRESSURE: 122 MMHG | HEIGHT: 65 IN | TEMPERATURE: 98.3 F | OXYGEN SATURATION: 97 % | DIASTOLIC BLOOD PRESSURE: 70 MMHG | RESPIRATION RATE: 18 BRPM | HEART RATE: 70 BPM | BODY MASS INDEX: 28.32 KG/M2 | WEIGHT: 170 LBS

## 2024-01-11 DIAGNOSIS — K74.60 CIRRHOSIS OF LIVER WITH ASCITES, UNSPECIFIED HEPATIC CIRRHOSIS TYPE: ICD-10-CM

## 2024-01-11 DIAGNOSIS — R18.8 CIRRHOSIS OF LIVER WITH ASCITES, UNSPECIFIED HEPATIC CIRRHOSIS TYPE: ICD-10-CM

## 2024-01-11 PROCEDURE — 43239 EGD BIOPSY SINGLE/MULTIPLE: CPT | Performed by: INTERNAL MEDICINE

## 2024-01-11 PROCEDURE — 88305 TISSUE EXAM BY PATHOLOGIST: CPT | Performed by: PATHOLOGY

## 2024-01-11 RX ORDER — SODIUM CHLORIDE, SODIUM LACTATE, POTASSIUM CHLORIDE, CALCIUM CHLORIDE 600; 310; 30; 20 MG/100ML; MG/100ML; MG/100ML; MG/100ML
50 INJECTION, SOLUTION INTRAVENOUS CONTINUOUS
Status: DISCONTINUED | OUTPATIENT
Start: 2024-01-11 | End: 2024-01-15 | Stop reason: HOSPADM

## 2024-01-11 RX ORDER — LIDOCAINE HYDROCHLORIDE 10 MG/ML
INJECTION, SOLUTION EPIDURAL; INFILTRATION; INTRACAUDAL; PERINEURAL AS NEEDED
Status: DISCONTINUED | OUTPATIENT
Start: 2024-01-11 | End: 2024-01-11

## 2024-01-11 RX ORDER — PROPOFOL 10 MG/ML
INJECTION, EMULSION INTRAVENOUS AS NEEDED
Status: DISCONTINUED | OUTPATIENT
Start: 2024-01-11 | End: 2024-01-11

## 2024-01-11 RX ADMIN — PROPOFOL 30 MG: 10 INJECTION, EMULSION INTRAVENOUS at 14:57

## 2024-01-11 RX ADMIN — SODIUM CHLORIDE, SODIUM LACTATE, POTASSIUM CHLORIDE, CALCIUM CHLORIDE 50 ML/HR: 600; 310; 30; 20 INJECTION, SOLUTION INTRAVENOUS at 14:43

## 2024-01-11 RX ADMIN — PROPOFOL 100 MG: 10 INJECTION, EMULSION INTRAVENOUS at 14:53

## 2024-01-11 RX ADMIN — LIDOCAINE HYDROCHLORIDE 50 MG: 10 INJECTION, SOLUTION EPIDURAL; INFILTRATION; INTRACAUDAL; PERINEURAL at 14:52

## 2024-01-11 NOTE — ANESTHESIA POSTPROCEDURE EVALUATION
Post-Op Assessment Note    CV Status:  Stable  Pain Score: 0    Pain management: adequate       Mental Status:  Sleepy and arousable   Hydration Status:  Euvolemic and stable   PONV Controlled:  None   Airway Patency:  Patent     Post Op Vitals Reviewed: Yes      Staff: Anesthesiologist               BP      Temp      Pulse     Resp      SpO2

## 2024-01-11 NOTE — H&P
"History and Physical -  [facility]  Vasyl Augustine 53 y.o. male MRN: [id number]    HPI: Vasyl Augustine is a 53 y.o. year old male who presents for EGD secondary to cirrhosis rule out varices    REVIEW OF SYSTEMS: Per the HPI, and otherwise unremarkable.    Historical Information   Past Medical History:   Diagnosis Date    Ascites     Cirrhosis (HCC)     Hepatitis C     [place] teeth extracted      Past Surgical History:   Procedure Laterality Date    IR BIOPSY BONE MARROW  11/12/2020    IR PARACENTESIS  5/26/2020    IR PARACENTESIS  6/3/2020    IR PARACENTESIS  6/9/2020    PARACENTESIS  5/29/2020         UPPER GASTROINTESTINAL ENDOSCOPY       Social History   Social History     Substance and Sexual Activity   Alcohol Use Not Currently     Social History     Substance and Sexual Activity   Drug Use Not Currently    Comment: methadone for 20 years      Social History     Tobacco Use   Smoking Status Never   Smokeless Tobacco Never     Family History   Problem Relation Age of Onset    Cirrhosis Maternal Grandmother         alcoholic cirrhosis    Colon polyps Neg Hx     Colon cancer Neg Hx        Meds/Allergies       Current Outpatient Medications:     acetaminophen (TYLENOL) 325 mg tablet    Ascorbic Acid (VITAMIN C PO)    Cholecalciferol (VITAMIN D3 PO)    furosemide (LASIX) 40 mg tablet    METHADONE HCL PO    milk thistle 175 MG tablet    Multiple Vitamin (multivitamin) tablet    spironolactone (ALDACTONE) 100 mg tablet    Current Facility-Administered Medications:     lactated ringers infusion, 50 mL/hr, Intravenous, Continuous, Rate Change at 01/11/24 1459    No Known Allergies    Objective     /78   Pulse 68   Temp 98.3 °F (36.8 °C) (Temporal)   Resp 15   Ht 5' 5\" (1.651 m)   Wt 77.1 kg (170 lb)   SpO2 100%   BMI 28.29 kg/m²     PHYSICAL EXAM    Gen: NAD AAOx3  Head: Normocephalic, Atraumatic  CV: S1S2 RRR no m/r/g  CHEST: Clear b/l no c/r/w  ABD: soft, +BS NT/ND  EXT: no " edema    ASSESSMENT/PLAN:  This is a 53 y.o. year old male here for EGD for varices screening, and he is stable and optimized for his procedure.

## 2024-01-11 NOTE — ANESTHESIA PREPROCEDURE EVALUATION
Procedure:  EGD    Relevant Problems   GI/HEPATIC   (+) Cirrhosis of liver with ascites    (+) Decompensation of cirrhosis of liver (HCC)   (+) Hepatitis C      HEMATOLOGY   (+) Pancytopenia (HCC)      Other   (+) Splenomegaly        Physical Exam    Airway    Mallampati score: II  TM Distance: >3 FB  Neck ROM: full     Dental    upper dentures    Cardiovascular  Cardiovascular exam normal    Pulmonary  Pulmonary exam normal     Other Findings        Anesthesia Plan  ASA Score- 3     Anesthesia Type- IV sedation with anesthesia with ASA Monitors.         Additional Monitors:     Airway Plan:            Plan Factors-    Chart reviewed.    Patient summary reviewed.    Patient is not a current smoker.              Induction- intravenous.    Postoperative Plan-     Informed Consent- Anesthetic plan and risks discussed with patient.  I personally reviewed this patient with the CRNA. Discussed and agreed on the Anesthesia Plan with the CRNA..

## 2024-01-12 ENCOUNTER — TELEPHONE (OUTPATIENT)
Age: 54
End: 2024-01-12

## 2024-01-12 NOTE — TELEPHONE ENCOUNTER
Patients GI provider:  Dr. Carballo    Number to return call: [phone]    Reason for call: Pt returning call from nurse to advise that he feels fine after proced, not experiencing any issues. Please call pt back if needed.    Scheduled procedure/appointment date if applicable: Apt 05/15/2024

## 2024-01-15 PROCEDURE — 88305 TISSUE EXAM BY PATHOLOGIST: CPT | Performed by: PATHOLOGY

## 2024-03-07 DIAGNOSIS — R18.8 CIRRHOSIS OF LIVER WITH ASCITES, UNSPECIFIED HEPATIC CIRRHOSIS TYPE: ICD-10-CM

## 2024-03-07 DIAGNOSIS — K74.60 CIRRHOSIS OF LIVER WITH ASCITES, UNSPECIFIED HEPATIC CIRRHOSIS TYPE: ICD-10-CM

## 2024-03-07 RX ORDER — SPIRONOLACTONE 100 MG/1
TABLET, FILM COATED ORAL
Qty: 90 TABLET | Refills: 1 | Status: SHIPPED | OUTPATIENT
Start: 2024-03-07

## 2024-05-15 ENCOUNTER — TELEPHONE (OUTPATIENT)
Dept: GASTROENTEROLOGY | Facility: CLINIC | Age: 54
End: 2024-05-15

## 2024-05-15 ENCOUNTER — OFFICE VISIT (OUTPATIENT)
Dept: GASTROENTEROLOGY | Facility: CLINIC | Age: 54
End: 2024-05-15
Payer: COMMERCIAL

## 2024-05-15 VITALS
DIASTOLIC BLOOD PRESSURE: 80 MMHG | WEIGHT: 175 LBS | HEIGHT: 64 IN | SYSTOLIC BLOOD PRESSURE: 130 MMHG | BODY MASS INDEX: 29.88 KG/M2

## 2024-05-15 DIAGNOSIS — R18.8 CIRRHOSIS OF LIVER WITH ASCITES, UNSPECIFIED HEPATIC CIRRHOSIS TYPE  (HCC): Primary | ICD-10-CM

## 2024-05-15 DIAGNOSIS — Z12.11 COLON CANCER SCREENING: ICD-10-CM

## 2024-05-15 DIAGNOSIS — K74.60 CIRRHOSIS OF LIVER WITH ASCITES, UNSPECIFIED HEPATIC CIRRHOSIS TYPE  (HCC): Primary | ICD-10-CM

## 2024-05-15 DIAGNOSIS — K74.60 DECOMPENSATION OF CIRRHOSIS OF LIVER (HCC): ICD-10-CM

## 2024-05-15 DIAGNOSIS — K72.90 DECOMPENSATION OF CIRRHOSIS OF LIVER (HCC): ICD-10-CM

## 2024-05-15 PROCEDURE — 99213 OFFICE O/P EST LOW 20 MIN: CPT | Performed by: INTERNAL MEDICINE

## 2024-05-15 NOTE — PROGRESS NOTES
[facility] - Outpatient Follow-up Note  Vasyl Augustine 53 y.o. male MRN: [id number]  Encounter: [id number]    ASSESSMENT AND PLAN:      1. Cirrhosis of liver with ascites, unspecified hepatic cirrhosis type  (HCC)  2. Decompensation of cirrhosis of liver (HCC)  Doing well on Lasix and Aldactone.  No evidence of ascites or lower extremity edema.  Static encephalopathy on exam.  Overdue for blood work and hepatoma screening.  Abdominal ultrasound negative for varices earlier this year  -Continue furosemide and Aldactone  - US abdomen limited  - AFP tumor marker  - CBC  - Comprehensive metabolic panel  - Protime-INR    - US abdomen limited; Future  (6 months)  - CBC  (6 months)  - Comprehensive metabolic panel  (6 months)  - Protime-INR (6 months)  - AFP tumor marker (6 months)    3. Colon cancer screening  Last colonoscopy May 2022.  Due for follow-up 2032      Follow up appointment: 6 months    _______________________      Chief Complaint   Patient presents with    Follow-up       HPI:   Patient is a 53 y.o. male with a significant PMH of decompensated cirrhosis presenting for follow up.  He reports that he is feeling well.  He denies any ascites or lower extremity edema.  Denies any GI bleeding.  Denies any easy bleeding or bruising.  He denies any hepatic encephalopathy.  His weight is increasing but he attributes that to eating well.  He is trying to stick to his fluid restriction is much as possible.  He denies any heartburn or indigestion.    Historical Information   Past Medical History:   Diagnosis Date    Ascites     Cirrhosis (HCC)     Hepatitis C     [place] teeth extracted      Past Surgical History:   Procedure Laterality Date    IR BIOPSY BONE MARROW  11/12/2020    IR PARACENTESIS  5/26/2020    IR PARACENTESIS  6/3/2020    IR PARACENTESIS  6/9/2020    PARACENTESIS  5/29/2020         UPPER GASTROINTESTINAL ENDOSCOPY       Social History     Substance and Sexual  "Activity   Alcohol Use Not Currently     Social History     Substance and Sexual Activity   Drug Use Not Currently    Comment: methadone for 20 years      Social History     Tobacco Use   Smoking Status Never   Smokeless Tobacco Never     Family History   Problem Relation Age of Onset    Cirrhosis Maternal Grandmother         alcoholic cirrhosis    Colon polyps Neg Hx     Colon cancer Neg Hx          Current Outpatient Medications:     acetaminophen (TYLENOL) 325 mg tablet    Ascorbic Acid (VITAMIN C PO)    Cholecalciferol (VITAMIN D3 PO)    furosemide (LASIX) 40 mg tablet    METHADONE HCL PO    milk thistle 175 MG tablet    Multiple Vitamin (multivitamin) tablet    spironolactone (ALDACTONE) 100 mg tablet  No Known Allergies  Reviewed medications and allergies and updated as indicated    PHYSICAL EXAM:    Blood pressure 130/80, height 5' 4\" (1.626 m), weight 79.4 kg (175 lb). Body mass index is 30.04 kg/m².  General Appearance: NAD, cooperative, alert  Eyes: Anicteric  Chest: Clear to auscultation  Heart: Regular rate and rhythm  GI:  Soft, non-tender, non-distended; normal bowel sounds; no masses, mild splenomegaly, no ascites  Rectal: Deferred  Musculoskeletal: No edema.  Skin:  No jaundice  Neurologic: No asterixis    Lab Results:   Lab Results   Component Value Date    WBC 1.88 (LL) 11/06/2023    WBC 1.82 (LL) 06/01/2023    WBC 1.80 (LL) 10/25/2022    HGB 13.0 11/06/2023    HGB 12.5 06/01/2023    HGB 11.9 (L) 10/25/2022    MCV 87 11/06/2023    PLT 35 (LL) 11/06/2023    PLT 38 (LL) 06/01/2023    PLT 35 (LL) 10/25/2022     Lab Results   Component Value Date    K 4.1 11/06/2023     11/06/2023    CO2 35 (H) 11/06/2023    BUN 12 11/06/2023    CREATININE 0.81 11/06/2023    GLUF 137 (H) 11/06/2023    CALCIUM 9.0 11/06/2023    CORRECTEDCA 9.7 11/25/2020    AST 21 11/06/2023    AST 23 06/01/2023    AST 23 10/25/2022    ALT 17 11/06/2023    ALT 24 06/01/2023    ALT 25 10/25/2022    ALKPHOS 66 11/06/2023    ALKPHOS " 74 06/01/2023    ALKPHOS 72 10/25/2022    EGFR 103 11/06/2023     Lab Results   Component Value Date    IRON 48 (L) 05/27/2020    TIBC 187 (L) 05/27/2020    FERRITIN 161 05/27/2020     Lab Results   Component Value Date    LIPASE 188 05/26/2020       Radiology Results:   No results found.

## 2024-05-15 NOTE — PATIENT INSTRUCTIONS
Continue spironolactone and furosemide.  Blood work and ultrasound now.  Repeat blood work and ultrasound in 6 months.  Follow-up office visit 6 months

## 2024-06-01 DIAGNOSIS — K74.60 CIRRHOSIS OF LIVER WITH ASCITES, UNSPECIFIED HEPATIC CIRRHOSIS TYPE  (HCC): ICD-10-CM

## 2024-06-01 DIAGNOSIS — R18.8 CIRRHOSIS OF LIVER WITH ASCITES, UNSPECIFIED HEPATIC CIRRHOSIS TYPE  (HCC): ICD-10-CM

## 2024-06-01 RX ORDER — FUROSEMIDE 40 MG/1
TABLET ORAL
Qty: 180 TABLET | Refills: 1 | Status: SHIPPED | OUTPATIENT
Start: 2024-06-01

## 2024-06-21 NOTE — TELEPHONE ENCOUNTER
Care Transition Team Assessment  PCP: Zahida Rojo, LACY PANSW met with pt at bedside to complete assessment. Pt A&Ox4 and able to verify the information on the face sheet. Pt lives with his spouse in a single-story house at [address] [place] [address], [place] [address] that has one step to enter.Prior to this hospitalization, pt reports being independent at home with ADLs and IADLs however, reports difficulty walking. Pt uses a motor w/c outside of his home and cane at baseline. Pt reports his spouse as good support for him. Pt is a retired  and receives [facility] monthly deposits. Pt denies any substance use or mental health concerns. Pt denies having an advance directive and refused AD packet at this time. Pt confirmed that he has [facility] hospital insurance. Pt confirmed he will have transportation home upon DC. Pt is set to DC home with help from spouse, anticipating no further CM needs at this time.  Information Source  Orientation Level: Oriented X4  Information Given By: Patient  Informant's Name: Alberto  Who is responsible for making decisions for patient? : Patient    Readmission Evaluation  Is this a readmission?: No    Elopement Risk  Legal Hold: No  Ambulatory or Self Mobile in Wheelchair: Yes  Disoriented: No  Psychiatric Symptoms: None  History of Wandering: No  Elopement this Admit: No  Vocalizing Wanting to Leave: No  Displays Behaviors, Body Language Wanting to Leave: No-Not at Risk for Elopement  Elopement Risk: Not at Risk for Elopement    Interdisciplinary Discharge Planning  Patient or legal guardian wants to designate a caregiver: Yes  Caregiver name: Rhonda Armenta  Caregiver contact info: [phone]    Discharge Preparedness  What is your plan after discharge?: Home with help  What are your discharge supports?: Spouse  Prior Functional Level: Ambulatory, Drives Self, Independent with Activities of Daily Living, Independent with Medication Management, Uses Wheelchair, Uses Cane  Difficulity with ADLs:  Telephone conversation with Kaylie Domínguez who is transplant coordinator at [facility]  She discussed the case with Dr Hillary Chaidez who is his primary hepatologist at [facility]  They would like him started on 24 weeks of Epclusa to treat his genotype 1a decompensated cirrhotic hepatitis C  Can we please get him pre certified and started on this medication    Thank you Walking  Difficulity with IADLs: None    Functional Assesment  Prior Functional Level: Ambulatory, Drives Self, Independent with Activities of Daily Living, Independent with Medication Management, Uses Wheelchair, Uses Cane    Finances  Financial Barriers to Discharge: No  Prescription Coverage: Yes    Vision / Hearing Impairment  Right Eye Vision: Wears Glasses  Left Eye Vision: Wears Glasses  Hearing Impairment: Both Ears, Hearing Device(s) Available    Advance Directive  Advance Directive?: None  Advance Directive offered?: AD Booklet refused    Domestic Abuse  Physical Abuse or Sexual Abuse: No  Verbal Abuse or Emotional Abuse: No  Possible Abuse/Neglect Reported to:: Not Applicable    Psychological Assessment  History of Substance Abuse: None  History of Psychiatric Problems: No  Non-compliant with Treatment: No  Newly Diagnosed Illness: No    Discharge Risks or Barriers  Discharge risks or barriers?: No    Anticipated Discharge Information  Discharge Disposition: Discharged to home/self care (01)

## 2024-07-28 PROBLEM — D69.6 THROMBOCYTOPENIA, UNSPECIFIED (HCC): Status: ACTIVE | Noted: 2024-07-28

## 2024-07-28 PROBLEM — D61.818 PANCYTOPENIA (HCC): Status: RESOLVED | Noted: 2020-05-26 | Resolved: 2024-07-28

## 2024-09-06 DIAGNOSIS — K74.60 CIRRHOSIS OF LIVER WITH ASCITES, UNSPECIFIED HEPATIC CIRRHOSIS TYPE  (HCC): ICD-10-CM

## 2024-09-06 DIAGNOSIS — R18.8 CIRRHOSIS OF LIVER WITH ASCITES, UNSPECIFIED HEPATIC CIRRHOSIS TYPE  (HCC): ICD-10-CM

## 2024-09-06 RX ORDER — SPIRONOLACTONE 100 MG/1
TABLET, FILM COATED ORAL
Qty: 90 TABLET | Refills: 1 | Status: SHIPPED | OUTPATIENT
Start: 2024-09-06

## 2024-09-30 ENCOUNTER — APPOINTMENT (OUTPATIENT)
Dept: LAB | Facility: HOSPITAL | Age: 54
End: 2024-09-30
Payer: COMMERCIAL

## 2024-09-30 DIAGNOSIS — K74.60 CIRRHOSIS OF LIVER WITHOUT ASCITES, UNSPECIFIED HEPATIC CIRRHOSIS TYPE (HCC): ICD-10-CM

## 2024-09-30 DIAGNOSIS — K72.90: ICD-10-CM

## 2024-09-30 LAB
AFP-TM SERPL-MCNC: 1.26 NG/ML (ref 0–9)
ALBUMIN SERPL BCG-MCNC: 4.3 G/DL (ref 3.5–5)
ALP SERPL-CCNC: 61 U/L (ref 34–104)
ALT SERPL W P-5'-P-CCNC: 17 U/L (ref 7–52)
ANION GAP SERPL CALCULATED.3IONS-SCNC: 9 MMOL/L (ref 4–13)
AST SERPL W P-5'-P-CCNC: 23 U/L (ref 13–39)
BASOPHILS # BLD AUTO: 0.01 THOUSANDS/ÂΜL (ref 0–0.1)
BASOPHILS NFR BLD AUTO: 1 % (ref 0–1)
BILIRUB SERPL-MCNC: 0.98 MG/DL (ref 0.2–1)
BUN SERPL-MCNC: 12 MG/DL (ref 5–25)
CALCIUM SERPL-MCNC: 8.9 MG/DL (ref 8.4–10.2)
CHLORIDE SERPL-SCNC: 98 MMOL/L (ref 96–108)
CO2 SERPL-SCNC: 32 MMOL/L (ref 21–32)
CREAT SERPL-MCNC: 0.73 MG/DL (ref 0.6–1.3)
EOSINOPHIL # BLD AUTO: 0.04 THOUSAND/ÂΜL (ref 0–0.61)
EOSINOPHIL NFR BLD AUTO: 2 % (ref 0–6)
ERYTHROCYTE [DISTWIDTH] IN BLOOD BY AUTOMATED COUNT: 14.5 % (ref 11.6–15.1)
GFR SERPL CREATININE-BSD FRML MDRD: 107 ML/MIN/1.73SQ M
GLUCOSE P FAST SERPL-MCNC: 118 MG/DL (ref 65–99)
HCT VFR BLD AUTO: 38.7 % (ref 36.5–49.3)
HGB BLD-MCNC: 13.1 G/DL (ref 12–17)
IMM GRANULOCYTES # BLD AUTO: 0 THOUSAND/UL (ref 0–0.2)
IMM GRANULOCYTES NFR BLD AUTO: 0 % (ref 0–2)
INR PPP: 1.17 (ref 0.85–1.19)
LYMPHOCYTES # BLD AUTO: 0.5 THOUSANDS/ÂΜL (ref 0.6–4.47)
LYMPHOCYTES NFR BLD AUTO: 27 % (ref 14–44)
MCH RBC QN AUTO: 28.9 PG (ref 26.8–34.3)
MCHC RBC AUTO-ENTMCNC: 33.9 G/DL (ref 31.4–37.4)
MCV RBC AUTO: 85 FL (ref 82–98)
MONOCYTES # BLD AUTO: 0.22 THOUSAND/ÂΜL (ref 0.17–1.22)
MONOCYTES NFR BLD AUTO: 12 % (ref 4–12)
NEUTROPHILS # BLD AUTO: 1.12 THOUSANDS/ÂΜL (ref 1.85–7.62)
NEUTS SEG NFR BLD AUTO: 58 % (ref 43–75)
NRBC BLD AUTO-RTO: 0 /100 WBCS
PLATELET # BLD AUTO: 37 THOUSANDS/UL (ref 149–390)
PLATELET BLD QL SMEAR: ABNORMAL
PMV BLD AUTO: 10.6 FL (ref 8.9–12.7)
POTASSIUM SERPL-SCNC: 4.1 MMOL/L (ref 3.5–5.3)
PROT SERPL-MCNC: 7.4 G/DL (ref 6.4–8.4)
PROTHROMBIN TIME: 15.2 SECONDS (ref 12.3–15)
RBC # BLD AUTO: 4.53 MILLION/UL (ref 3.88–5.62)
RBC MORPH BLD: NORMAL
SODIUM SERPL-SCNC: 139 MMOL/L (ref 135–147)
WBC # BLD AUTO: 1.89 THOUSAND/UL (ref 4.31–10.16)

## 2024-09-30 PROCEDURE — 85025 COMPLETE CBC W/AUTO DIFF WBC: CPT

## 2024-09-30 PROCEDURE — 36415 COLL VENOUS BLD VENIPUNCTURE: CPT

## 2024-09-30 PROCEDURE — 85610 PROTHROMBIN TIME: CPT

## 2024-09-30 PROCEDURE — 82105 ALPHA-FETOPROTEIN SERUM: CPT

## 2024-09-30 PROCEDURE — 80053 COMPREHEN METABOLIC PANEL: CPT

## 2024-10-29 ENCOUNTER — OFFICE VISIT (OUTPATIENT)
Dept: FAMILY MEDICINE CLINIC | Facility: CLINIC | Age: 54
End: 2024-10-29
Payer: COMMERCIAL

## 2024-10-29 VITALS
SYSTOLIC BLOOD PRESSURE: 136 MMHG | DIASTOLIC BLOOD PRESSURE: 88 MMHG | BODY MASS INDEX: 29.52 KG/M2 | OXYGEN SATURATION: 98 % | WEIGHT: 172 LBS | TEMPERATURE: 98.7 F | HEART RATE: 73 BPM

## 2024-10-29 DIAGNOSIS — B18.2 CHRONIC HEPATITIS C WITHOUT HEPATIC COMA (HCC): ICD-10-CM

## 2024-10-29 DIAGNOSIS — Z76.89 ENCOUNTER TO ESTABLISH CARE: Primary | ICD-10-CM

## 2024-10-29 DIAGNOSIS — D69.6 THROMBOCYTOPENIA, UNSPECIFIED (HCC): ICD-10-CM

## 2024-10-29 DIAGNOSIS — D72.819 LEUKOPENIA, UNSPECIFIED TYPE: ICD-10-CM

## 2024-10-29 DIAGNOSIS — K74.60 CIRRHOSIS OF LIVER WITH ASCITES, UNSPECIFIED HEPATIC CIRRHOSIS TYPE  (HCC): ICD-10-CM

## 2024-10-29 DIAGNOSIS — Z13.220 ENCOUNTER FOR LIPID SCREENING FOR CARDIOVASCULAR DISEASE: ICD-10-CM

## 2024-10-29 DIAGNOSIS — F11.20 METHADONE MAINTENANCE THERAPY PATIENT (HCC): Chronic | ICD-10-CM

## 2024-10-29 DIAGNOSIS — R16.1 SPLENOMEGALY: ICD-10-CM

## 2024-10-29 DIAGNOSIS — Z13.6 ENCOUNTER FOR LIPID SCREENING FOR CARDIOVASCULAR DISEASE: ICD-10-CM

## 2024-10-29 DIAGNOSIS — R73.01 IFG (IMPAIRED FASTING GLUCOSE): ICD-10-CM

## 2024-10-29 DIAGNOSIS — Z12.5 SCREENING FOR MALIGNANT NEOPLASM OF PROSTATE: ICD-10-CM

## 2024-10-29 DIAGNOSIS — R18.8 CIRRHOSIS OF LIVER WITH ASCITES, UNSPECIFIED HEPATIC CIRRHOSIS TYPE  (HCC): ICD-10-CM

## 2024-10-29 PROCEDURE — 99203 OFFICE O/P NEW LOW 30 MIN: CPT

## 2024-10-29 NOTE — PROGRESS NOTES
"Ambulatory Visit  Name: Vasyl Augustine      : 1970      MRN: [id number]  Encounter Provider: MARIN Simon  Encounter Date: 10/29/2024   Encounter department: [facility]    Assessment & Plan  Encounter to establish care         Leukopenia, unspecified type  Trend CBC. States, \"I've had low WBC for like 30 years. They thought I had leukemia but my bone marrow biopsy was negative.\"  Orders:    CBC and differential; Future    Thrombocytopenia, unspecified (HCC)  Trend WBC--- cirrhosis  Orders:    CBC and differential; Future    Cirrhosis of liver with ascites, unspecified hepatic cirrhosis type  (HCC)  New insurance. Was told that he needs a new GI referral and US order. Both placed. Plans to continue treatment with Dr. Carballo.   Orders:    Comprehensive metabolic panel; Future    Ambulatory Referral to Gastroenterology; Future    US abdomen limited; Future    Encounter for lipid screening for cardiovascular disease    Orders:    Lipid Panel with Direct LDL reflex; Future    Screening for malignant neoplasm of prostate    Orders:    PSA, Total Screen; Future    Splenomegaly    Orders:    Ambulatory Referral to Gastroenterology; Future    US abdomen limited; Future    Methadone maintenance therapy patient (HCC)  Follows with [facility].         Chronic hepatitis C without hepatic coma (HCC)  Completed 6 month treatment.          IFG (impaired fasting glucose)    Orders:    Hemoglobin A1C; Future      Depression Screening and Follow-up Plan: Patient was screened for depression during today's encounter. They screened negative with a PHQ-2 score of 0.      History of Present Illness     Presents to establish care without any current concerns.         History obtained from : patient  Review of Systems   Constitutional:  Negative for activity change, fatigue and fever.   HENT:  Negative for congestion, ear pain, rhinorrhea and sore throat.    Eyes: "  Negative for pain.   Respiratory:  Negative for cough, shortness of breath and wheezing.    Cardiovascular:  Negative for chest pain and leg swelling.   Gastrointestinal:  Negative for abdominal distention, abdominal pain, constipation, diarrhea, nausea and vomiting.   Musculoskeletal:  Negative for arthralgias and myalgias.   Skin:  Negative for color change and rash.   Neurological:  Negative for dizziness, weakness and numbness.   All other systems reviewed and are negative.    Medical History Reviewed by provider this encounter:  Tobacco  Allergies  Meds  Problems  Med Hx  Surg Hx  Fam Hx       Current Outpatient Medications on File Prior to Visit   Medication Sig Dispense Refill    acetaminophen (TYLENOL) 325 mg tablet Take 2 tablets (650 mg total) by mouth every 8 (eight) hours as needed for mild pain 30 tablet 0    Ascorbic Acid (VITAMIN C PO) Take by mouth      Cholecalciferol (VITAMIN D3 PO) Take by mouth      furosemide (LASIX) 40 mg tablet take 1 tablet by mouth twice a day 180 tablet 1    METHADONE HCL PO Take 100 mg by mouth daily      milk thistle 175 MG tablet Take 175 mg by mouth daily      spironolactone (ALDACTONE) 100 mg tablet take 1 tablet by mouth once daily 90 tablet 1    Multiple Vitamin (multivitamin) tablet Take 1 tablet by mouth daily      [DISCONTINUED] polyethylene glycol-electrolytes (TriLyte) 4000 mL solution Take 4,000 mL by mouth once for 1 dose Take 4000 mL by mouth once for 1 dose. Use as directed 4000 mL 0     No current facility-administered medications on file prior to visit.          Objective     /88 (BP Location: Left arm, Patient Position: Sitting, Cuff Size: Standard)   Pulse 73   Temp 98.7 °F (37.1 °C) (Tympanic)   Wt 78 kg (172 lb)   SpO2 98%   BMI 29.52 kg/m²     Physical Exam  Vitals and nursing note reviewed.   Constitutional:       General: He is not in acute distress.     Appearance: He is well-developed.   HENT:      Head: Normocephalic and  atraumatic.      Right Ear: External ear normal.      Left Ear: External ear normal.   Cardiovascular:      Rate and Rhythm: Normal rate and regular rhythm.      Heart sounds: Normal heart sounds. No murmur heard.  Pulmonary:      Effort: Pulmonary effort is normal. No respiratory distress.      Breath sounds: Normal breath sounds. No wheezing.   Abdominal:      General: Bowel sounds are normal. There is no distension.      Palpations: Abdomen is soft.      Tenderness: There is no abdominal tenderness. There is no guarding or rebound.   Musculoskeletal:      Cervical back: Neck supple.   Skin:     General: Skin is warm and dry.      Coloration: Skin is not jaundiced.      Findings: No rash.   Neurological:      Mental Status: He is alert and oriented to person, place, and time. Mental status is at baseline.   Psychiatric:         Mood and Affect: Mood normal.         Behavior: Behavior normal.       Administrative Statements   I have spent a total time of 30 minutes in caring for this patient on the day of the visit/encounter including Risks and benefits of tx options, Instructions for management, Patient and family education, Importance of tx compliance, Risk factor reductions, Impressions, Documenting in the medical record, Reviewing / ordering tests, medicine, procedures  , and Obtaining or reviewing history  .

## 2024-10-29 NOTE — ASSESSMENT & PLAN NOTE
New insurance. Was told that he needs a new GI referral and US order. Both placed. Plans to continue treatment with Dr. Carballo.   Orders:    Comprehensive metabolic panel; Future    Ambulatory Referral to Gastroenterology; Future    US abdomen limited; Future

## 2024-11-15 ENCOUNTER — HOSPITAL ENCOUNTER (OUTPATIENT)
Dept: ULTRASOUND IMAGING | Facility: HOSPITAL | Age: 54
End: 2024-11-15
Attending: INTERNAL MEDICINE
Payer: COMMERCIAL

## 2024-11-15 DIAGNOSIS — K74.60 DECOMPENSATION OF CIRRHOSIS OF LIVER (HCC): ICD-10-CM

## 2024-11-15 DIAGNOSIS — K72.90 DECOMPENSATION OF CIRRHOSIS OF LIVER (HCC): ICD-10-CM

## 2024-11-15 PROCEDURE — 76705 ECHO EXAM OF ABDOMEN: CPT

## 2024-11-20 ENCOUNTER — RESULTS FOLLOW-UP (OUTPATIENT)
Dept: GASTROENTEROLOGY | Facility: CLINIC | Age: 54
End: 2024-11-20

## 2024-11-28 DIAGNOSIS — K74.60 CIRRHOSIS OF LIVER WITH ASCITES, UNSPECIFIED HEPATIC CIRRHOSIS TYPE  (HCC): ICD-10-CM

## 2024-11-28 DIAGNOSIS — R18.8 CIRRHOSIS OF LIVER WITH ASCITES, UNSPECIFIED HEPATIC CIRRHOSIS TYPE  (HCC): ICD-10-CM

## 2024-11-29 RX ORDER — FUROSEMIDE 40 MG/1
40 TABLET ORAL 2 TIMES DAILY
Qty: 180 TABLET | Refills: 1 | Status: SHIPPED | OUTPATIENT
Start: 2024-11-29

## 2024-12-10 ENCOUNTER — OFFICE VISIT (OUTPATIENT)
Dept: FAMILY MEDICINE CLINIC | Facility: CLINIC | Age: 54
End: 2024-12-10
Payer: COMMERCIAL

## 2024-12-10 VITALS
OXYGEN SATURATION: 96 % | DIASTOLIC BLOOD PRESSURE: 82 MMHG | HEART RATE: 59 BPM | BODY MASS INDEX: 30.38 KG/M2 | SYSTOLIC BLOOD PRESSURE: 124 MMHG | WEIGHT: 177 LBS

## 2024-12-10 DIAGNOSIS — R18.8 CIRRHOSIS OF LIVER WITH ASCITES, UNSPECIFIED HEPATIC CIRRHOSIS TYPE  (HCC): ICD-10-CM

## 2024-12-10 DIAGNOSIS — E78.2 MIXED HYPERLIPIDEMIA: ICD-10-CM

## 2024-12-10 DIAGNOSIS — Z00.00 WELLNESS EXAMINATION: Primary | ICD-10-CM

## 2024-12-10 DIAGNOSIS — D69.6 THROMBOCYTOPENIA, UNSPECIFIED (HCC): ICD-10-CM

## 2024-12-10 DIAGNOSIS — K74.60 CIRRHOSIS OF LIVER WITH ASCITES, UNSPECIFIED HEPATIC CIRRHOSIS TYPE  (HCC): ICD-10-CM

## 2024-12-10 DIAGNOSIS — G47.00 INSOMNIA, UNSPECIFIED TYPE: ICD-10-CM

## 2024-12-10 DIAGNOSIS — Z12.5 SCREENING FOR MALIGNANT NEOPLASM OF PROSTATE: ICD-10-CM

## 2024-12-10 DIAGNOSIS — R73.01 IFG (IMPAIRED FASTING GLUCOSE): ICD-10-CM

## 2024-12-10 DIAGNOSIS — F11.20 METHADONE MAINTENANCE THERAPY PATIENT (HCC): Chronic | ICD-10-CM

## 2024-12-10 PROCEDURE — 99214 OFFICE O/P EST MOD 30 MIN: CPT

## 2024-12-10 PROCEDURE — 99396 PREV VISIT EST AGE 40-64: CPT

## 2024-12-10 RX ORDER — HYDROXYZINE HYDROCHLORIDE 50 MG/1
TABLET, FILM COATED ORAL
Qty: 30 TABLET | Refills: 0 | Status: SHIPPED | OUTPATIENT
Start: 2024-12-10

## 2024-12-10 NOTE — ASSESSMENT & PLAN NOTE
Patient reports that he has no difficulty falling asleep but will wake 2-3 hr after falling asleep and then will wake every hour. Trial hydroxyzine. Follow up in 3 months or sooner if hydroxyzine ineffective.   Orders:    hydrOXYzine HCL (ATARAX) 50 mg tablet; Take 1 tablet (50 mg total) by mouth daily at bedtime as needed for insomnia    Comprehensive metabolic panel; Future

## 2024-12-10 NOTE — PROGRESS NOTES
Adult Annual Physical  Name: Vasyl Augustine      : 1970      MRN: [id number]  Encounter Provider: MARIN Simon  Encounter Date: 12/10/2024   Encounter department: [facility]    Assessment & Plan  Insomnia, unspecified type  Patient reports that he has no difficulty falling asleep but will wake 2-3 hr after falling asleep and then will wake every hour. Trial hydroxyzine. Follow up in 3 months or sooner if hydroxyzine ineffective.   Orders:    hydrOXYzine HCL (ATARAX) 50 mg tablet; Take 1 tablet (50 mg total) by mouth daily at bedtime as needed for insomnia    Comprehensive metabolic panel; Future    Wellness examination         Methadone maintenance therapy patient (HCC)  Follows with methadone clinic.          Cirrhosis of liver with ascites, unspecified hepatic cirrhosis type  (HCC)  Follows with GI.     Orders:    Comprehensive metabolic panel; Future    Thrombocytopenia, unspecified (HCC)  Trend CBC.  Orders:    CBC and differential; Future    Comprehensive metabolic panel; Future    Mixed hyperlipidemia    Orders:    Lipid Panel with Direct LDL reflex; Future    IFG (impaired fasting glucose)    Orders:    Hemoglobin A1c (w/out EAG); Future    Screening for malignant neoplasm of prostate    Orders:    PSA Total (Reflex To Free); Future    Immunizations and preventive care screenings were discussed with patient today. Appropriate education was printed on patient's after visit summary.    Discussed risks and benefits of prostate cancer screening. We discussed the controversial history of PSA screening for prostate cancer in the [place] as well as the risk of over detection and over treatment of prostate cancer by way of PSA screening.  The patient understands that PSA blood testing is an imperfect way to screen for prostate cancer and that elevated PSA levels in the blood may also be caused by infection, inflammation, prostatic trauma or  manipulation, urological procedures, or by benign prostatic enlargement.    The role of the digital rectal examination in prostate cancer screening was also discussed and I discussed with him that there is large interobserver variability in the findings of digital rectal examination.    Counseling:  Dental Health: discussed importance of regular tooth brushing, flossing, and dental visits.  Exercise: the importance of regular exercise/physical activity was discussed. Recommend exercise 3-5 times per week for at least 30 minutes.       Depression Screening and Follow-up Plan: Patient was screened for depression during today's encounter. They screened negative with a PHQ-2 score of 0.        History of Present Illness     Adult Annual Physical:  Patient presents for annual physical.     Diet and Physical Activity:  - Diet/Nutrition: well balanced diet. does drink soda  - Exercise: no formal exercise.    Depression Screening:  - PHQ-2 Score: 0    General Health:  - Sleep:. sleep for 2-3 hrs and then wakes every hour  - Hearing: normal hearing bilateral ears.  - Vision: most recent eye exam < 1 year ago and wears glasses.  - Dental: no dental visits for > 1 year and does not floss.     Health:  - History of STDs: no.   - Urinary symptoms: none.     Advanced Care Planning:  - Has an advanced directive?: no    - Has a durable medical POA?: no    - ACP document given to patient?: yes      Review of Systems   Constitutional:  Negative for activity change, fatigue and fever.   HENT:  Negative for congestion, ear pain, rhinorrhea and sore throat.    Eyes:  Negative for pain.   Respiratory:  Negative for cough, shortness of breath and wheezing.    Cardiovascular:  Negative for chest pain and leg swelling.   Gastrointestinal:  Negative for abdominal pain, diarrhea, nausea and vomiting.   Musculoskeletal:  Negative for arthralgias and myalgias.   Skin:  Negative for rash.   Neurological:  Negative for dizziness, weakness and  numbness.   Psychiatric/Behavioral:  Positive for sleep disturbance. Negative for dysphoric mood. The patient is not nervous/anxious.    All other systems reviewed and are negative.    Medical History Reviewed by provider this encounter:  Tobacco  Allergies  Meds  Problems  Med Hx  Surg Hx  Fam Hx     .  Current Outpatient Medications on File Prior to Visit   Medication Sig Dispense Refill    acetaminophen (TYLENOL) 325 mg tablet Take 2 tablets (650 mg total) by mouth every 8 (eight) hours as needed for mild pain 30 tablet 0    Ascorbic Acid (VITAMIN C PO) Take by mouth      Cholecalciferol (VITAMIN D3 PO) Take by mouth      furosemide (LASIX) 40 mg tablet take 1 tablet by mouth twice a day 180 tablet 1    METHADONE HCL PO Take 100 mg by mouth daily      milk thistle 175 MG tablet Take 175 mg by mouth daily      Multiple Vitamin (multivitamin) tablet Take 1 tablet by mouth daily      spironolactone (ALDACTONE) 100 mg tablet take 1 tablet by mouth once daily 90 tablet 1    [DISCONTINUED] polyethylene glycol-electrolytes (TriLyte) 4000 mL solution Take 4,000 mL by mouth once for 1 dose Take 4000 mL by mouth once for 1 dose. Use as directed 4000 mL 0     No current facility-administered medications on file prior to visit.        Objective   /82 (BP Location: Left arm, Patient Position: Sitting, Cuff Size: Standard)   Pulse 59   Wt 80.3 kg (177 lb)   SpO2 96%   BMI 30.38 kg/m²     Physical Exam  Vitals and nursing note reviewed.   Constitutional:       General: He is not in acute distress.     Appearance: He is well-developed.   HENT:      Head: Normocephalic and atraumatic.      Right Ear: Tympanic membrane, ear canal and external ear normal. There is no impacted cerumen.      Left Ear: Tympanic membrane, ear canal and external ear normal. There is no impacted cerumen.      Mouth/Throat:      Mouth: Mucous membranes are moist.      Pharynx: No oropharyngeal exudate or posterior oropharyngeal erythema.    Cardiovascular:      Rate and Rhythm: Normal rate and regular rhythm.      Heart sounds: Normal heart sounds. No murmur heard.  Pulmonary:      Effort: Pulmonary effort is normal. No respiratory distress.      Breath sounds: Normal breath sounds. No wheezing.   Abdominal:      General: Bowel sounds are normal. There is no distension.      Palpations: Abdomen is soft.      Tenderness: There is no abdominal tenderness.   Musculoskeletal:      Cervical back: Neck supple.   Skin:     General: Skin is warm and dry.      Capillary Refill: Capillary refill takes less than 2 seconds.      Coloration: Skin is pale.   Neurological:      Mental Status: He is alert and oriented to person, place, and time. Mental status is at baseline.   Psychiatric:         Mood and Affect: Mood normal.         Behavior: Behavior normal.       Administrative Statements   I have spent a total time of 30 minutes in caring for this patient on the day of the visit/encounter including Risks and benefits of tx options, Instructions for management, Patient and family education, Importance of tx compliance, Risk factor reductions, Impressions, Documenting in the medical record, Reviewing / ordering tests, medicine, procedures  , and Obtaining or reviewing history  .

## 2024-12-25 LAB
ALBUMIN SERPL-MCNC: 4.2 G/DL (ref 3.8–4.9)
ALP SERPL-CCNC: 73 IU/L (ref 44–121)
ALT SERPL-CCNC: 17 IU/L (ref 0–44)
AST SERPL-CCNC: 22 IU/L (ref 0–40)
BASOPHILS # BLD AUTO: 0 X10E3/UL (ref 0–0.2)
BASOPHILS NFR BLD AUTO: 1 %
BILIRUB SERPL-MCNC: 0.7 MG/DL (ref 0–1.2)
BUN SERPL-MCNC: 12 MG/DL (ref 6–24)
BUN/CREAT SERPL: 16 (ref 9–20)
CALCIUM SERPL-MCNC: 8.9 MG/DL (ref 8.7–10.2)
CHLORIDE SERPL-SCNC: 98 MMOL/L (ref 96–106)
CHOLEST SERPL-MCNC: 98 MG/DL (ref 100–199)
CO2 SERPL-SCNC: 28 MMOL/L (ref 20–29)
CREAT SERPL-MCNC: 0.76 MG/DL (ref 0.76–1.27)
EGFR: 108 ML/MIN/1.73
EOSINOPHIL # BLD AUTO: 0 X10E3/UL (ref 0–0.4)
EOSINOPHIL NFR BLD AUTO: 2 %
ERYTHROCYTE [DISTWIDTH] IN BLOOD BY AUTOMATED COUNT: 13.8 % (ref 11.6–15.4)
GLOBULIN SER-MCNC: 2.8 G/DL (ref 1.5–4.5)
GLUCOSE SERPL-MCNC: 112 MG/DL (ref 70–99)
HBA1C MFR BLD: 5.6 % (ref 4.8–5.6)
HCT VFR BLD AUTO: 38 % (ref 37.5–51)
HDLC SERPL-MCNC: 33 MG/DL
HGB BLD-MCNC: 12.3 G/DL (ref 13–17.7)
IMM GRANULOCYTES # BLD: 0 X10E3/UL (ref 0–0.1)
IMM GRANULOCYTES NFR BLD: 1 %
LDLC SERPL CALC-MCNC: 45 MG/DL (ref 0–99)
LDLC/HDLC SERPL: 1.4 RATIO (ref 0–3.6)
LYMPHOCYTES # BLD AUTO: 0.4 X10E3/UL (ref 0.7–3.1)
LYMPHOCYTES NFR BLD AUTO: 28 %
MCH RBC QN AUTO: 28.3 PG (ref 26.6–33)
MCHC RBC AUTO-ENTMCNC: 32.4 G/DL (ref 31.5–35.7)
MCV RBC AUTO: 88 FL (ref 79–97)
MONOCYTES # BLD AUTO: 0.2 X10E3/UL (ref 0.1–0.9)
MONOCYTES NFR BLD AUTO: 13 %
MORPHOLOGY BLD-IMP: ABNORMAL
NEUTROPHILS # BLD AUTO: 0.9 X10E3/UL (ref 1.4–7)
NEUTROPHILS NFR BLD AUTO: 55 %
PLATELET # BLD AUTO: 31 X10E3/UL (ref 150–450)
POTASSIUM SERPL-SCNC: 3.6 MMOL/L (ref 3.5–5.2)
PROT SERPL-MCNC: 7 G/DL (ref 6–8.5)
PSA SERPL-MCNC: <0.1 NG/ML (ref 0–4)
RBC # BLD AUTO: 4.34 X10E6/UL (ref 4.14–5.8)
SL AMB REFLEX CRITERIA: NORMAL
SL AMB VLDL CHOLESTEROL CALC: 20 MG/DL (ref 5–40)
SODIUM SERPL-SCNC: 138 MMOL/L (ref 134–144)
TRIGL SERPL-MCNC: 103 MG/DL (ref 0–149)
WBC # BLD AUTO: 1.6 X10E3/UL (ref 3.4–10.8)

## 2024-12-26 ENCOUNTER — RESULTS FOLLOW-UP (OUTPATIENT)
Dept: FAMILY MEDICINE CLINIC | Facility: CLINIC | Age: 54
End: 2024-12-26

## 2024-12-26 ENCOUNTER — OFFICE VISIT (OUTPATIENT)
Dept: GASTROENTEROLOGY | Facility: CLINIC | Age: 54
End: 2024-12-26
Payer: COMMERCIAL

## 2024-12-26 VITALS
DIASTOLIC BLOOD PRESSURE: 80 MMHG | WEIGHT: 178 LBS | HEIGHT: 64 IN | BODY MASS INDEX: 30.39 KG/M2 | SYSTOLIC BLOOD PRESSURE: 120 MMHG

## 2024-12-26 DIAGNOSIS — R18.8 CIRRHOSIS OF LIVER WITH ASCITES, UNSPECIFIED HEPATIC CIRRHOSIS TYPE  (HCC): ICD-10-CM

## 2024-12-26 DIAGNOSIS — K74.60 CIRRHOSIS OF LIVER WITH ASCITES, UNSPECIFIED HEPATIC CIRRHOSIS TYPE  (HCC): ICD-10-CM

## 2024-12-26 DIAGNOSIS — D61.818 PANCYTOPENIA (HCC): Primary | ICD-10-CM

## 2024-12-26 DIAGNOSIS — Z12.11 COLON CANCER SCREENING: Primary | ICD-10-CM

## 2024-12-26 PROCEDURE — 99213 OFFICE O/P EST LOW 20 MIN: CPT | Performed by: INTERNAL MEDICINE

## 2024-12-26 NOTE — PROGRESS NOTES
"Name: Vasyl Augustine      : 1970      MRN: [id number]  Encounter Provider: Raj Carballo MD  Encounter Date: 2024   [facility]  :  Assessment & Plan  Cirrhosis of liver with ascites, unspecified hepatic cirrhosis type  (HCC)  Secondary to alcohol and hepatitis C.  Doing well.  Abstinent for many years from alcohol.  Completed course of Epclusa in  with complete remission.  No evidence of ascites or lower extremity edema on exam on Aldactone/Lasix.  No evidence of hepatic encephalopathy.  Last EGD 2024 negative for varices.  Negative hepatoma screening 2024.  MELD score 8    -Continue Lasix and Aldactone  -Repeat blood work and ultrasound in 6 months  -Follow-up office visit 6 months  Orders:    Ambulatory Referral to Gastroenterology    Comprehensive metabolic panel; Future    CBC; Future    Protime-INR; Future    AFP tumor marker; Future    US abdomen complete; Future    Colon cancer screening  Last colonoscopy May 2022.  10-year recall           History of Present Illness   HPI  Vasyl Augustine is a 54 y.o. male who presents with a history of decompensated cirrhosis secondary to alcohol and hepatitis C with ascites who returns today for follow-up.  He has been doing very well.  He is chronically on Aldactone and Lasix.  He denies any ascites or lower extremity edema.  He denies any GI bleeding.  He denies any hepatic encephalopathy.  He overall feels very well.  History obtained from: patient         Objective   /80   Ht 5' 4\" (1.626 m)   Wt 80.7 kg (178 lb)   BMI 30.55 kg/m²      Physical Exam  General appearance: alert, appears stated age and cooperative  Eyes: PERLLA, EOMI, no icterus   Head: Normocephalic, without obvious abnormality, atraumatic  Lungs: clear to auscultation bilaterally  Heart: regular rate and rhythm, S1, S2 normal, no murmur, click, rub or gallop  Abdomen: soft, non-tender; " bowel sounds normal; no masses,  no organomegaly  Extremities: extremities normal, atraumatic, no cyanosis or edema  Neurologic: Grossly normal.  No asterixis  Skin: Multiple tattoos present

## 2024-12-26 NOTE — PATIENT INSTRUCTIONS
Continue Aldactone and Lasix.  Recheck blood work and ultrasound in 6 months.  Follow-up office visit with me in 6 months

## 2024-12-26 NOTE — ASSESSMENT & PLAN NOTE
Secondary to alcohol and hepatitis C.  Doing well.  Abstinent for many years from alcohol.  Completed course of Epclusa in 2021 with complete remission.  No evidence of ascites or lower extremity edema on exam on Aldactone/Lasix.  No evidence of hepatic encephalopathy.  Last EGD January 2024 negative for varices.  Negative hepatoma screening November 2024.  MELD score 8    -Continue Lasix and Aldactone  -Repeat blood work and ultrasound in 6 months  -Follow-up office visit 6 months  Orders:    Ambulatory Referral to Gastroenterology    Comprehensive metabolic panel; Future    CBC; Future    Protime-INR; Future    AFP tumor marker; Future    US abdomen complete; Future

## 2025-02-20 ENCOUNTER — RESULTS FOLLOW-UP (OUTPATIENT)
Dept: GASTROENTEROLOGY | Facility: CLINIC | Age: 55
End: 2025-02-20

## 2025-02-20 LAB
AFP-TM SERPL-MCNC: <1.8 NG/ML (ref 0–8.4)
ALBUMIN SERPL-MCNC: 4.4 G/DL (ref 3.8–4.9)
ALP SERPL-CCNC: 86 IU/L (ref 44–121)
ALT SERPL-CCNC: 18 IU/L (ref 0–44)
AST SERPL-CCNC: 26 IU/L (ref 0–40)
BILIRUB SERPL-MCNC: 0.9 MG/DL (ref 0–1.2)
BUN SERPL-MCNC: 15 MG/DL (ref 6–24)
BUN/CREAT SERPL: 19 (ref 9–20)
CALCIUM SERPL-MCNC: 8.9 MG/DL (ref 8.7–10.2)
CHLORIDE SERPL-SCNC: 100 MMOL/L (ref 96–106)
CO2 SERPL-SCNC: 26 MMOL/L (ref 20–29)
CREAT SERPL-MCNC: 0.81 MG/DL (ref 0.76–1.27)
EGFR: 106 ML/MIN/1.73
ERYTHROCYTE [DISTWIDTH] IN BLOOD BY AUTOMATED COUNT: 13.9 % (ref 11.6–15.4)
GLOBULIN SER-MCNC: 2.8 G/DL (ref 1.5–4.5)
GLUCOSE SERPL-MCNC: 109 MG/DL (ref 70–99)
HCT VFR BLD AUTO: 38.3 % (ref 37.5–51)
HGB BLD-MCNC: 12.8 G/DL (ref 13–17.7)
INR PPP: 1.1 (ref 0.9–1.2)
MCH RBC QN AUTO: 28.7 PG (ref 26.6–33)
MCHC RBC AUTO-ENTMCNC: 33.4 G/DL (ref 31.5–35.7)
MCV RBC AUTO: 86 FL (ref 79–97)
MORPHOLOGY BLD-IMP: NORMAL
PLATELET # BLD AUTO: 33 X10E3/UL (ref 150–450)
POTASSIUM SERPL-SCNC: 3.8 MMOL/L (ref 3.5–5.2)
PROT SERPL-MCNC: 7.2 G/DL (ref 6–8.5)
PROTHROMBIN TIME: 12.3 SEC (ref 9.1–12)
RBC # BLD AUTO: 4.46 X10E6/UL (ref 4.14–5.8)
SODIUM SERPL-SCNC: 140 MMOL/L (ref 134–144)
WBC # BLD AUTO: 1.8 X10E3/UL (ref 3.4–10.8)

## 2025-02-21 NOTE — PROGRESS NOTES
Name: Vasyl Augustine      : 1970      MRN: [id number]  Encounter Provider: MARIN Rene  Encounter Date: 2025   Encounter department: [facility] HEMATOLOGY ONCOLOGY SPECIAL[facility]  :  Assessment & Plan  Pancytopenia (HCC)  Patient is a 54-year-old gentleman with chronic pancytopenia secondary to cirrhosis, splenomegaly.  He has undergone extensive hematologic workup in past including bone marrow biopsy which resulted with normal findings  It was suspected his pancytopenia was related to his splenomegaly, cirrhosis and hepatitis C along with portal hypertension.    Based on lab trends below, pancytopenia is very stable, actually improved.  Do not recommend repeating bone marrow at this time as his counts are better than when he was previously narrowed and he is asymptomatic.  I will check B12, folate levels  Otherwise, given stability in counts and the fact he is asymptomatic recommend continued observation.    He should follow with hematology on at least a yearly basis.  I will call him with results of B12 and folate levels and if he is deficient will replete.  Otherwise I will see him back in 1 year with repeat blood work.  Patient is in agreement.  He will continue to follow closely with GI and his PCP in the interim    Component  Ref Range & Units (hover) 24  7:08 AM 23  7:09 AM 23  7:10 AM 10/25/22  7:02 AM 22  2:24 PM 20  6:48 AM 20 10:31 AM   WBC 1.89 Low  1.88 Low Panic  1.82 Low Panic  1.80 Low Panic  CM 2.48 Low  1.79 Low Panic  CM 1.38 Low Panic  CM   RBC 4.53 4.59 4.37 4.16 4.58 3.94 3.35 Low    Hemoglobin 13.1 13.0 12.5 11.9 Low  12.6 11.4 Low  10.6 Low    Hematocrit 38.7 39.8 37.1 35.5 Low  39.5 34.7 Low  30.4 Low    MCV 85 87 85 85 86 88 91   MCH 28.9 28.3 28.6 28.6 27.5 28.9 31.6   MCHC 33.9 32.7 33.7 33.5 31.9 32.9 34.9   RDW 14.5 14.6 14.6 14.5 14.2 14.5 14.2   MPV 10.6 10.5 10.0 10.7 CM 9.6 11.1 10.7   Platelets 37 Low  35  Low Panic  38 Low Panic  CM 35 Low Panic  CM 46 Low Panic  CM 41 Low Panic  CM 33 Low Panic  CM     2/19/25  6:41 AM 12/24/24  8:52 AM    White Blood Cell Count 1.8 Low Panic  1.6 Low Panic    Red Blood Cell Count 4.46 4.34   Hemoglobin 12.8 Low  12.3 Low    HCT 38.3 38.0   MCV 86 88   MCH 28.7 28.3   MCHC 33.4 32.4   RDW 13.9 13.8   Platelet Count 33 Low Panic  31 Low Panic        Orders:    Ambulatory Referral to Hematology / Oncology    Folate; Future    Methylmalonic acid, serum; Future    Vitamin B12; Future    CBC and differential; Future    Comprehensive metabolic panel; Future    Splenomegaly    Orders:    CBC and differential; Future    Comprehensive metabolic panel; Future    Decompensation of cirrhosis of liver (HCC)    Orders:    CBC and differential; Future    Comprehensive metabolic panel; Future      Return in about 1 year (around 2/24/2026) for Desi, bloodwork.    History of Present Illness   Chief Complaint   Patient presents with    Consult     Vasyl Augustine is a 54-year-old gentleman with a history of hepatitis C, alcoholic cirrhosis, portal Hypertension, splenomegaly  He follows closely with GI.  He completed a course of Epclusa in 2021 with complete remission of hep C.    Previously seen by Dr. Fischer in 2020 for evaluation of pancytopenia along with splenomegaly.  He underwent bone marrow biopsy as part of his workup on 11/12/2020 which resulted with normal findings.  It was suspected his pancytopenia was related to his splenomegaly, cirrhosis and hepatitis C along with portal hypertension.  Final Diagnosis   A, B & C.  Bone marrow, right iliac, core needle biopsy, aspirate clot section & aspirate:  - Normocellular marrow with adequate & progressive trilineage hematopoiesis, without dysplastic features, myeloblasts ~ 1% - see Note.     Specific findings:    Biopsy & clot section:  - Normocellular for age (50% cell) marrow with:   * erythropoiesis appears adequate & progressive, M:E ~ 2.5:1.     * granulopoiesis appears adequate & progressive to full maturation without abnormally located immature precursors, myeloblasts ~ 1% - see Note.   * megakaryocytes are numerically adequate & morphologically unremarkable.  - Stainable macrophage storage iron is present but appears reduced.  - Reticulin fibrosis (cytochemical stains) is grade 0 of 3 in  Concensus system.  - Lymphocytes and plasma cells appear mature, scattered, not increased.  - No collagen fibrosis, no granulomata, no vasculitis and no necrosis.       Aspirate slides (10):  - Trilineage hematopoiesis appears:   * erythropoiesis:  normoblastic & progressive erythropoiesis without dysplastic features; iron stains fail to demonstrate ringed sideroblasts.   * granulopoiesis appears progressive without dysplastic features, blasts ~ 2.5% - see Note.   * megakaryocytes are present, somewhat left-shifted but without dysplastic morphologic features.  - Lymphocytes & plasma cells appear mature, scattered & without morphologic atypia to suggest neoplasia.  - Iron stains reveal reduced (trace/4+) macrophage storage iron      Flow cytometry ([facility]#[id number] , evaluated by Dr. EVANGELISTA Raya) reveals:    - NO phenotypic evidence of excess blasts or myeloid dysmaturation - there is a mixed population of granulocytes/  maturing myeloid precursors, blasts, monocytes, and lymphocytes. Granulocytes/maturing myeloid precursors (75.65%) do not display overt phenotypic atypia associated with dysmaturation and/or asynchronous shift to the left. No aberrant maturation pattern is noted. +/HLA-DR+ myeloblasts comprise (1.91%) of total events. Myeloid-committed CD34+ population comprise (3.35%) of the total events. Monocytes (4.06%) appear mature without overt phenotypic atypia.  - NO phenotypic evidence of non-Hodgkin lymphoma   B-cells (0.95%) are polytypic & T-cells (4.63%) show no deletion or abnormal dim expression of pan-T-cell antigens on significant subset of  "cells. The CD4:CD8 ratio is (1.11:1). The T-LGL cells comprise 0.35%.  - Viability 7AAD: 96%.  - The following antigens were evaluated & found to be expressed as described above or by appropriate cells: CD2, CD3, CD4, CD5, CD7, CD8, CD10, CD11b, CD11c, CD13, CD14, CD16, CD19, CD20, CD22, CD23, CD33, CD34, CD38, CD45, CD56, CD57, CD64, , FMC-7, HLA-DR, sKappa, sLambda.     Based on review of blood work dating back to 2020, his CBC-D is actually quite stable if not mildly improved since he was first evaluated by hematology.    Most recent abdominal ultrasound from 11/15/2024 demonstrated cirrhosis, no hepatoma or mass    Recent labs from 2/19/25 demonstrated stable and actually improved pancytopenia with white count 1.8, hemoglobin 12.8, MCV 86, platelet count 33.  No differential was done  LFTS normal    Quit drinking about 15 years ago, former IV drug use. Methadone since 1996.  Denies B symptoms.  No frequent or recurrent infections.  No fever/chills.  No abnormal bleeding or bruising.  Will experience an occasional nosebleed but can get bleeding to stop and has not had a recent nosebleed.  Follows very closely with Dr. Carballo, GI.  AFP tumor marker normal          Objective   /80 (BP Location: Left arm, Patient Position: Sitting, Cuff Size: Standard)   Pulse 66   Temp (!) 97.3 °F (36.3 °C) (Temporal)   Ht 5' 4\" (1.626 m)   Wt 79.4 kg (175 lb)   SpO2 99%   BMI 30.04 kg/m²     Physical Exam  Constitutional:       General: He is not in acute distress.     Appearance: He is not toxic-appearing.   HENT:      Head: Normocephalic and atraumatic.      Mouth/Throat:      Pharynx: No oropharyngeal exudate.   Eyes:      General: No scleral icterus.  Cardiovascular:      Rate and Rhythm: Normal rate and regular rhythm.   Pulmonary:      Effort: Pulmonary effort is normal.      Breath sounds: Normal breath sounds.   Abdominal:      General: Bowel sounds are normal.      Palpations: Abdomen is soft. "   Musculoskeletal:         General: Normal range of motion.      Cervical back: Normal range of motion.   Lymphadenopathy:      Cervical: No cervical adenopathy.   Skin:     General: Skin is warm and dry.   Neurological:      General: No focal deficit present.      Mental Status: He is alert and oriented to person, place, and time.   Psychiatric:         Mood and Affect: Mood normal.         Behavior: Behavior normal.         Labs: I have reviewed the following labs:  Results for orders placed or performed in visit on 12/26/24   Comprehensive metabolic panel   Result Value Ref Range    Glucose, Random 109 (H) 70 - 99 mg/dL    BUN 15 6 - 24 mg/dL    Creatinine 0.81 0.76 - 1.27 mg/dL    eGFR 106 >59 mL/min/1.73    SL AMB BUN/CREATININE RATIO 19 9 - 20    Sodium 140 134 - 144 mmol/L    Potassium 3.8 3.5 - 5.2 mmol/L    Chloride 100 96 - 106 mmol/L    CO2 26 20 - 29 mmol/L    CALCIUM 8.9 8.7 - 10.2 mg/dL    Protein, Total 7.2 6.0 - 8.5 g/dL    Albumin 4.4 3.8 - 4.9 g/dL    Globulin, Total 2.8 1.5 - 4.5 g/dL    TOTAL BILIRUBIN 0.9 0.0 - 1.2 mg/dL    Alk Phos Isoenzymes 86 44 - 121 IU/L    AST 26 0 - 40 IU/L    ALT 18 0 - 44 IU/L   CBC   Result Value Ref Range    White Blood Cell Count 1.8 (LL) 3.4 - 10.8 x10E3/uL    Red Blood Cell Count 4.46 4.14 - 5.80 x10E6/uL    Hemoglobin 12.8 (L) 13.0 - 17.7 g/dL    HCT 38.3 37.5 - 51.0 %    MCV 86 79 - 97 fL    MCH 28.7 26.6 - 33.0 pg    MCHC 33.4 31.5 - 35.7 g/dL    RDW 13.9 11.6 - 15.4 %    Platelet Count 33 (LL) 150 - 450 x10E3/uL   Protime-INR   Result Value Ref Range    INR 1.1 0.9 - 1.2    Prothrombin Time 12.3 (H) 9.1 - 12.0 sec   AFP tumor marker   Result Value Ref Range    AFP-Tumor Marker <1.8 0.0 - 8.4 ng/mL   [facility] Comment   Result Value Ref Range    Hematology Comments Note:      Lab Results   Component Value Date/Time    WBC 1.89 (L) 09/30/2024 07:08 AM    White Blood Cell Count 1.8 (LL) 02/19/2025 06:41 AM    RBC 4.53 09/30/2024 07:08 AM    Red Blood Cell Count  4.46 02/19/2025 06:41 AM    Hemoglobin 12.8 (L) 02/19/2025 06:41 AM    Hemoglobin 13.1 09/30/2024 07:08 AM    Hematocrit 38.7 09/30/2024 07:08 AM    HCT 38.3 02/19/2025 06:41 AM    MCV 86 02/19/2025 06:41 AM    MCV 85 09/30/2024 07:08 AM    MCH 28.7 02/19/2025 06:41 AM    MCH 28.9 09/30/2024 07:08 AM    RDW 13.9 02/19/2025 06:41 AM    RDW 14.5 09/30/2024 07:08 AM    Platelet Count 33 (LL) 02/19/2025 06:41 AM    Platelets 37 (L) 09/30/2024 07:08 AM    Segmented % 58 09/30/2024 07:08 AM    Neutrophils 55 12/24/2024 08:52 AM    Lymphocytes % 27 09/30/2024 07:08 AM    Lymphocytes 28 12/24/2024 08:52 AM    Monocytes % 12 09/30/2024 07:08 AM    Monocytes 13 12/24/2024 08:52 AM    Eosinophils Relative 2 09/30/2024 07:08 AM    Eosinophils 2 12/24/2024 08:52 AM    Basophils Relative 1 09/30/2024 07:08 AM    Immature Grans % 0 09/30/2024 07:08 AM    Absolute Neutrophils 1.12 (L) 09/30/2024 07:08 AM    Neutrophils (Absolute) 0.9 (L) 12/24/2024 08:52 AM      Lab Results   Component Value Date/Time    Sodium 140 02/19/2025 06:41 AM    Potassium 3.8 02/19/2025 06:41 AM    Chloride 100 02/19/2025 06:41 AM    CO2 26 02/19/2025 06:41 AM    ANION GAP 9 09/30/2024 07:08 AM    BUN 15 02/19/2025 06:41 AM    Creatinine 0.81 02/19/2025 06:41 AM    Creatinine 0.73 09/30/2024 07:08 AM    Glucose, Random 109 (H) 02/19/2025 06:41 AM    Glucose, Fasting 118 (H) 09/30/2024 07:08 AM    Calcium 8.9 09/30/2024 07:08 AM    AST 26 02/19/2025 06:41 AM    ALT 18 02/19/2025 06:41 AM    Alkaline Phosphatase 61 09/30/2024 07:08 AM    Protein, Total 7.2 02/19/2025 06:41 AM    Albumin 4.4 02/19/2025 06:41 AM    Albumin 4.3 09/30/2024 07:08 AM    TOTAL BILIRUBIN 0.9 02/19/2025 06:41 AM    eGFR 106 02/19/2025 06:41 AM    eGFR 107 09/30/2024 07:08 AM            Administrative Statements   I have spent a total time of 45 minutes in caring for this patient on the day of the visit/encounter including Diagnostic results, Instructions for management, Patient and  [place] family education, Impressions, Documenting in the medical record, Reviewing/placing orders in the medical record (including tests, medications, and/or procedures), and Obtaining or reviewing history  .   Improved 0 Minute(s)

## 2025-02-24 ENCOUNTER — CONSULT (OUTPATIENT)
Age: 55
End: 2025-02-24
Payer: COMMERCIAL

## 2025-02-24 VITALS
OXYGEN SATURATION: 99 % | TEMPERATURE: 97.3 F | DIASTOLIC BLOOD PRESSURE: 80 MMHG | HEART RATE: 66 BPM | BODY MASS INDEX: 29.88 KG/M2 | WEIGHT: 175 LBS | SYSTOLIC BLOOD PRESSURE: 129 MMHG | HEIGHT: 64 IN

## 2025-02-24 DIAGNOSIS — K72.90 DECOMPENSATION OF CIRRHOSIS OF LIVER (HCC): ICD-10-CM

## 2025-02-24 DIAGNOSIS — R16.1 SPLENOMEGALY: ICD-10-CM

## 2025-02-24 DIAGNOSIS — K74.60 DECOMPENSATION OF CIRRHOSIS OF LIVER (HCC): ICD-10-CM

## 2025-02-24 DIAGNOSIS — D61.818 PANCYTOPENIA (HCC): Primary | ICD-10-CM

## 2025-02-24 PROBLEM — D69.6 THROMBOCYTOPENIA, UNSPECIFIED (HCC): Status: RESOLVED | Noted: 2024-07-28 | Resolved: 2025-02-24

## 2025-02-24 PROCEDURE — 99204 OFFICE O/P NEW MOD 45 MIN: CPT | Performed by: NURSE PRACTITIONER

## 2025-02-24 NOTE — ASSESSMENT & PLAN NOTE
Patient is a 54-year-old gentleman with chronic pancytopenia secondary to cirrhosis, splenomegaly.  He has undergone extensive hematologic workup in past including bone marrow biopsy which resulted with normal findings  It was suspected his pancytopenia was related to his splenomegaly, cirrhosis and hepatitis C along with portal hypertension.    Based on lab trends below, pancytopenia is very stable, actually improved.  Do not recommend repeating bone marrow at this time as his counts are better than when he was previously narrowed and he is asymptomatic.  I will check B12, folate levels  Otherwise, given stability in counts and the fact he is asymptomatic recommend continued observation.    He should follow with hematology on at least a yearly basis.  I will call him with results of B12 and folate levels and if he is deficient will replete.  Otherwise I will see him back in 1 year with repeat blood work.  Patient is in agreement.  He will continue to follow closely with GI and his PCP in the interim    Component  Ref Range & Units (hover) 9/30/24  7:08 AM 11/6/23  7:09 AM 6/1/23  7:10 AM 10/25/22  7:02 AM 2/18/22  2:24 PM 11/25/20  6:48 AM 8/31/20 10:31 AM   WBC 1.89 Low  1.88 Low Panic  1.82 Low Panic  1.80 Low Panic  CM 2.48 Low  1.79 Low Panic  CM 1.38 Low Panic  CM   RBC 4.53 4.59 4.37 4.16 4.58 3.94 3.35 Low    Hemoglobin 13.1 13.0 12.5 11.9 Low  12.6 11.4 Low  10.6 Low    Hematocrit 38.7 39.8 37.1 35.5 Low  39.5 34.7 Low  30.4 Low    MCV 85 87 85 85 86 88 91   MCH 28.9 28.3 28.6 28.6 27.5 28.9 31.6   MCHC 33.9 32.7 33.7 33.5 31.9 32.9 34.9   RDW 14.5 14.6 14.6 14.5 14.2 14.5 14.2   MPV 10.6 10.5 10.0 10.7 CM 9.6 11.1 10.7   Platelets 37 Low  35 Low Panic  38 Low Panic  CM 35 Low Panic  CM 46 Low Panic  CM 41 Low Panic  CM 33 Low Panic  CM     2/19/25  6:41 AM 12/24/24  8:52 AM    White Blood Cell Count 1.8 Low Panic  1.6 Low Panic    Red Blood Cell Count 4.46 4.34   Hemoglobin 12.8 Low  12.3 Low    HCT 38.3  38.0   MCV 86 88   MCH 28.7 28.3   MCHC 33.4 32.4   RDW 13.9 13.8   Platelet Count 33 Low Panic  31 Low Panic        Orders:    Ambulatory Referral to Hematology / Oncology    Folate; Future    Methylmalonic acid, serum; Future    Vitamin B12; Future    CBC and differential; Future    Comprehensive metabolic panel; Future

## 2025-03-10 DIAGNOSIS — R18.8 CIRRHOSIS OF LIVER WITH ASCITES, UNSPECIFIED HEPATIC CIRRHOSIS TYPE  (HCC): ICD-10-CM

## 2025-03-10 DIAGNOSIS — K74.60 CIRRHOSIS OF LIVER WITH ASCITES, UNSPECIFIED HEPATIC CIRRHOSIS TYPE  (HCC): ICD-10-CM

## 2025-03-11 RX ORDER — SPIRONOLACTONE 100 MG/1
100 TABLET, FILM COATED ORAL DAILY
Qty: 90 TABLET | Refills: 1 | Status: SHIPPED | OUTPATIENT
Start: 2025-03-11

## 2025-05-29 DIAGNOSIS — R18.8 CIRRHOSIS OF LIVER WITH ASCITES, UNSPECIFIED HEPATIC CIRRHOSIS TYPE  (HCC): ICD-10-CM

## 2025-05-29 DIAGNOSIS — K74.60 CIRRHOSIS OF LIVER WITH ASCITES, UNSPECIFIED HEPATIC CIRRHOSIS TYPE  (HCC): ICD-10-CM

## 2025-05-29 RX ORDER — FUROSEMIDE 40 MG/1
40 TABLET ORAL 2 TIMES DAILY
Qty: 180 TABLET | Refills: 1 | Status: SHIPPED | OUTPATIENT
Start: 2025-05-29

## 2025-06-29 LAB
AFP-TM SERPL-MCNC: <1.8 NG/ML (ref 0–8.4)
ALBUMIN SERPL-MCNC: 4.1 G/DL (ref 3.8–4.9)
ALP SERPL-CCNC: 72 IU/L (ref 44–121)
ALT SERPL-CCNC: 17 IU/L (ref 0–44)
AST SERPL-CCNC: 25 IU/L (ref 0–40)
BILIRUB SERPL-MCNC: 1.1 MG/DL (ref 0–1.2)
BUN SERPL-MCNC: 14 MG/DL (ref 6–24)
BUN/CREAT SERPL: 18 (ref 9–20)
CALCIUM SERPL-MCNC: 9 MG/DL (ref 8.7–10.2)
CHLORIDE SERPL-SCNC: 100 MMOL/L (ref 96–106)
CO2 SERPL-SCNC: 23 MMOL/L (ref 20–29)
CREAT SERPL-MCNC: 0.8 MG/DL (ref 0.76–1.27)
EGFR: 106 ML/MIN/1.73
ERYTHROCYTE [DISTWIDTH] IN BLOOD BY AUTOMATED COUNT: 14.4 % (ref 11.6–15.4)
GLOBULIN SER-MCNC: 2.8 G/DL (ref 1.5–4.5)
GLUCOSE SERPL-MCNC: 119 MG/DL (ref 70–99)
HCT VFR BLD AUTO: 37.1 % (ref 37.5–51)
HGB BLD-MCNC: 12 G/DL (ref 13–17.7)
INR PPP: 1.1 (ref 0.9–1.2)
MCH RBC QN AUTO: 28.1 PG (ref 26.6–33)
MCHC RBC AUTO-ENTMCNC: 32.3 G/DL (ref 31.5–35.7)
MCV RBC AUTO: 87 FL (ref 79–97)
MORPHOLOGY BLD-IMP: NORMAL
PLATELET # BLD AUTO: 30 X10E3/UL (ref 150–450)
POTASSIUM SERPL-SCNC: 4.4 MMOL/L (ref 3.5–5.2)
PROT SERPL-MCNC: 6.9 G/DL (ref 6–8.5)
PROTHROMBIN TIME: 12.4 SEC (ref 9.1–12)
RBC # BLD AUTO: 4.27 X10E6/UL (ref 4.14–5.8)
SODIUM SERPL-SCNC: 138 MMOL/L (ref 134–144)
WBC # BLD AUTO: 1.2 X10E3/UL (ref 3.4–10.8)

## 2025-07-01 ENCOUNTER — OFFICE VISIT (OUTPATIENT)
Dept: GASTROENTEROLOGY | Facility: CLINIC | Age: 55
End: 2025-07-01
Payer: COMMERCIAL

## 2025-07-01 VITALS
BODY MASS INDEX: 30.39 KG/M2 | SYSTOLIC BLOOD PRESSURE: 118 MMHG | HEIGHT: 64 IN | WEIGHT: 178 LBS | DIASTOLIC BLOOD PRESSURE: 72 MMHG

## 2025-07-01 DIAGNOSIS — K74.60 CIRRHOSIS OF LIVER WITH ASCITES, UNSPECIFIED HEPATIC CIRRHOSIS TYPE  (HCC): Primary | ICD-10-CM

## 2025-07-01 DIAGNOSIS — R18.8 CIRRHOSIS OF LIVER WITH ASCITES, UNSPECIFIED HEPATIC CIRRHOSIS TYPE  (HCC): Primary | ICD-10-CM

## 2025-07-01 DIAGNOSIS — D61.818 PANCYTOPENIA (HCC): ICD-10-CM

## 2025-07-01 PROCEDURE — 99213 OFFICE O/P EST LOW 20 MIN: CPT | Performed by: INTERNAL MEDICINE

## 2025-07-01 NOTE — PROGRESS NOTES
"Name: Vasyl Augustine      : 1970      MRN: [id number]  Encounter Provider: Raj Carballo MD  Encounter Date: 2025   Encounter department: [facility] GASTROENTEROLOGY SPECIAL[facility]  :  Assessment & Plan  Cirrhosis of liver with ascites, unspecified hepatic cirrhosis type  (HCC)  Secondary to alcohol and hepatitis C.  Doing well.  Abstinent for many years from alcohol.  Completed course of Epclusa in  with complete remission.  No evidence of ascites or lower extremity edema on exam on Aldactone/Lasix.  No evidence of hepatic encephalopathy.  Last EGD 2024 negative for varices.  Negative hepatoma screening 2024.  Scheduled for f/u Saturday.  MELD score 8   - Continue Lasix and Aldactone  -Abdominal ultrasound as scheduled Saturday  -Follow-up blood work and ultrasound in 6 months  Orders:    CBC; Future    AFP tumor marker; Future    Protime-INR; Future    Comprehensive metabolic panel; Future    US abdomen complete; Future    Pancytopenia (HCC)  Suspect related to cirrhosis.  Being evaluated by Desi Gray from hematology           History of Present Illness   Vasyl Augustine is a 54 y.o. male who presents for follow-up.  From a liver standpoint he is doing well.  He denies any ascites or lower extremity edema.  He continues to take Aldactone and Lasix.  He denies any encephalopathy.  He denies any GI bleeding.  He admits to easy bruising which he previously was on Plavix.  He recently was evaluated by hematology secondary to pancytopenia.  He reports he divided in the past and has actually had a bone marrow biopsy.  HPI  History obtained from: patient  Review of Systems A complete review of systems is negative other than that noted above in the HPI.      Current Medications[1]  Objective   /72   Ht 5' 4\" (1.626 m)   Wt 80.7 kg (178 lb)   BMI 30.55 kg/m²     Physical Exam General appearance: alert, appears stated age and cooperative  Eyes: PERLLA, EOMI, no " icterus   Head: Normocephalic, without obvious abnormality, atraumatic  Lungs: clear to auscultation bilaterally  Heart: regular rate and rhythm, S1, S2 normal, no murmur, click, rub or gallop  Abdomen: soft, non-tender; bowel sounds normal; no masses,  no organomegaly  Extremities: extremities normal, atraumatic, no cyanosis or edema  Neurologic: Grossly normal.  No asterixis        Lab Results: I personally reviewed relevant lab results. CBC/BMP: No new results in last 24 hours. , LFTs: No new results in last 24 hours. , PTT/INR:No new results in last 24 hours.       Results for orders placed during the hospital encounter of 01/11/24    EGD    Impression  Normal duodenum  Nodular gastritis in antrum.  Biopsies taken  Normal esophagus  No esophageal or gastric varices    RECOMMENDATION:  Resume regular diet and medications  I will call with biopsy results in 1 to 2 weeks  Follow-up office visit 6 months  Repeat EGD 3 years        Raj Carballo MD             [1]   Current Outpatient Medications   Medication Sig Dispense Refill    acetaminophen (TYLENOL) 325 mg tablet Take 2 tablets (650 mg total) by mouth every 8 (eight) hours as needed for mild pain 30 tablet 0    Ascorbic Acid (VITAMIN C PO) Take by mouth      Cholecalciferol (VITAMIN D3 PO) Take by mouth      furosemide (LASIX) 40 mg tablet take 1 tablet by mouth twice a day 180 tablet 1    hydrOXYzine HCL (ATARAX) 50 mg tablet Take 1 tablet (50 mg total) by mouth daily at bedtime as needed for insomnia 30 tablet 0    METHADONE HCL PO Take 100 mg by mouth in the morning.      milk thistle 175 MG tablet Take 175 mg by mouth in the morning.      Multiple Vitamin (multivitamin) tablet Take 1 tablet by mouth in the morning.      spironolactone (ALDACTONE) 100 mg tablet take 1 tablet by mouth once daily 90 tablet 1     No current facility-administered medications for this visit.

## 2025-07-01 NOTE — LETTER
2025     MARIN Simon  [address] [id number]    Patient: Vasyl Augustine   YOB: 1970   Date of Visit: 2025       Dear MARIN Reilly CRNP:    Thank you for referring Vasyl Augustine to me for evaluation. Below are my notes for this consultation.    If you have questions, please do not hesitate to call me. I look forward to following your patient along with you.         Sincerely,        Raj Carballo MD        CC: MARIN Rene MD  2025  4:21 PM  Sign when Signing Visit  Name: Vasyl Augustine      : 1970      MRN: [id number]  Encounter Provider: Raj Carballo MD  Encounter Date: 2025   Encounter department: [facility] GASTROENTEROLOGY SPECIAL[facility]  :  Assessment & Plan  Cirrhosis of liver with ascites, unspecified hepatic cirrhosis type  (HCC)  Secondary to alcohol and hepatitis C.  Doing well.  Abstinent for many years from alcohol.  Completed course of Epclusa in  with complete remission.  No evidence of ascites or lower extremity edema on exam on Aldactone/Lasix.  No evidence of hepatic encephalopathy.  Last EGD 2024 negative for varices.  Negative hepatoma screening 2024.  Scheduled for f/u Saturday.  MELD score 8   - Continue Lasix and Aldactone  -Abdominal ultrasound as scheduled Saturday  -Follow-up blood work and ultrasound in 6 months  Orders:  •  CBC; Future  •  AFP tumor marker; Future  •  Protime-INR; Future  •  Comprehensive metabolic panel; Future  •  US abdomen complete; Future    Pancytopenia (HCC)  Suspect related to cirrhosis.  Being evaluated by Desi Gray from hematology           History of Present Illness  Vasyl Augustine is a 54 y.o. male who presents for follow-up.  From a liver standpoint he is doing well.  He denies any ascites or lower extremity edema.  He continues to take Aldactone  "and Lasix.  He denies any encephalopathy.  He denies any GI bleeding.  He admits to easy bruising which he previously was on Plavix.  He recently was evaluated by hematology secondary to pancytopenia.  He reports he divided in the past and has actually had a bone marrow biopsy.  HPI  History obtained from: patient  Review of Systems A complete review of systems is negative other than that noted above in the HPI.      Current Medications[1]  Objective  /72   Ht 5' 4\" (1.626 m)   Wt 80.7 kg (178 lb)   BMI 30.55 kg/m²     Physical Exam General appearance: alert, appears stated age and cooperative  Eyes: PERLLA, EOMI, no icterus   Head: Normocephalic, without obvious abnormality, atraumatic  Lungs: clear to auscultation bilaterally  Heart: regular rate and rhythm, S1, S2 normal, no murmur, click, rub or gallop  Abdomen: soft, non-tender; bowel sounds normal; no masses,  no organomegaly  Extremities: extremities normal, atraumatic, no cyanosis or edema  Neurologic: Grossly normal.  No asterixis        Lab Results: I personally reviewed relevant lab results. CBC/BMP: No new results in last 24 hours. , LFTs: No new results in last 24 hours. , PTT/INR:No new results in last 24 hours.       Results for orders placed during the hospital encounter of 01/11/24    EGD    Impression  Normal duodenum  Nodular gastritis in antrum.  Biopsies taken  Normal esophagus  No esophageal or gastric varices    RECOMMENDATION:  Resume regular diet and medications  I will call with biopsy results in 1 to 2 weeks  Follow-up office visit 6 months  Repeat EGD 3 years        Raj Carballo MD             [1]   Current Outpatient Medications   Medication Sig Dispense Refill   • acetaminophen (TYLENOL) 325 mg tablet Take 2 tablets (650 mg total) by mouth every 8 (eight) hours as needed for mild pain 30 tablet 0   • Ascorbic Acid (VITAMIN C PO) Take by mouth     • Cholecalciferol (VITAMIN D3 PO) Take by mouth     • furosemide (LASIX) 40 " mg tablet take 1 tablet by mouth twice a day 180 tablet 1   • hydrOXYzine HCL (ATARAX) 50 mg tablet Take 1 tablet (50 mg total) by mouth daily at bedtime as needed for insomnia 30 tablet 0   • METHADONE HCL PO Take 100 mg by mouth in the morning.     • milk thistle 175 MG tablet Take 175 mg by mouth in the morning.     • Multiple Vitamin (multivitamin) tablet Take 1 tablet by mouth in the morning.     • spironolactone (ALDACTONE) 100 mg tablet take 1 tablet by mouth once daily 90 tablet 1     No current facility-administered medications for this visit.

## 2025-07-01 NOTE — PATIENT INSTRUCTIONS
Continue Aldactone and Lasix.  Abdominal ultrasound on Saturday as scheduled.  I will let you know what it shows.  Follow-up office visit 6 months.  Blood work and ultrasound prior to that visit as usual.

## 2025-07-01 NOTE — ASSESSMENT & PLAN NOTE
Secondary to alcohol and hepatitis C.  Doing well.  Abstinent for many years from alcohol.  Completed course of Epclusa in 2021 with complete remission.  No evidence of ascites or lower extremity edema on exam on Aldactone/Lasix.  No evidence of hepatic encephalopathy.  Last EGD January 2024 negative for varices.  Negative hepatoma screening November 2024.  Scheduled for f/u Saturday.  MELD score 8   - Continue Lasix and Aldactone  -Abdominal ultrasound as scheduled Saturday  -Follow-up blood work and ultrasound in 6 months  Orders:    CBC; Future    AFP tumor marker; Future    Protime-INR; Future    Comprehensive metabolic panel; Future    US abdomen complete; Future

## 2025-07-04 LAB
ALBUMIN SERPL-MCNC: 4.2 G/DL (ref 3.8–4.9)
ALP SERPL-CCNC: 71 IU/L (ref 44–121)
ALT SERPL-CCNC: 20 IU/L (ref 0–44)
AST SERPL-CCNC: 30 IU/L (ref 0–40)
BASOPHILS # BLD AUTO: 0 X10E3/UL (ref 0–0.2)
BASOPHILS NFR BLD AUTO: 0 %
BILIRUB SERPL-MCNC: 1.1 MG/DL (ref 0–1.2)
BUN SERPL-MCNC: 14 MG/DL (ref 6–24)
BUN/CREAT SERPL: 19 (ref 9–20)
CALCIUM SERPL-MCNC: 8.9 MG/DL (ref 8.7–10.2)
CHLORIDE SERPL-SCNC: 101 MMOL/L (ref 96–106)
CO2 SERPL-SCNC: 23 MMOL/L (ref 20–29)
CREAT SERPL-MCNC: 0.73 MG/DL (ref 0.76–1.27)
EGFR: 109 ML/MIN/1.73
EOSINOPHIL # BLD AUTO: 0 X10E3/UL (ref 0–0.4)
EOSINOPHIL NFR BLD AUTO: 1 %
ERYTHROCYTE [DISTWIDTH] IN BLOOD BY AUTOMATED COUNT: 14.3 % (ref 11.6–15.4)
FOLATE SERPL-MCNC: >20 NG/ML
GLOBULIN SER-MCNC: 2.5 G/DL (ref 1.5–4.5)
GLUCOSE SERPL-MCNC: 119 MG/DL (ref 70–99)
HCT VFR BLD AUTO: 37.5 % (ref 37.5–51)
HGB BLD-MCNC: 12.3 G/DL (ref 13–17.7)
IMM GRANULOCYTES # BLD: 0 X10E3/UL (ref 0–0.1)
IMM GRANULOCYTES NFR BLD: 0 %
LYMPHOCYTES # BLD AUTO: 0.3 X10E3/UL (ref 0.7–3.1)
LYMPHOCYTES NFR BLD AUTO: 22 %
MCH RBC QN AUTO: 29.1 PG (ref 26.6–33)
MCHC RBC AUTO-ENTMCNC: 32.8 G/DL (ref 31.5–35.7)
MCV RBC AUTO: 89 FL (ref 79–97)
METHYLMALONATE SERPL-SCNC: 175 NMOL/L (ref 0–378)
MONOCYTES # BLD AUTO: 0.1 X10E3/UL (ref 0.1–0.9)
MONOCYTES NFR BLD AUTO: 11 %
MORPHOLOGY BLD-IMP: ABNORMAL
NEUTROPHILS # BLD AUTO: 0.8 X10E3/UL (ref 1.4–7)
NEUTROPHILS NFR BLD AUTO: 66 %
PLATELET # BLD AUTO: 29 X10E3/UL (ref 150–450)
POTASSIUM SERPL-SCNC: 4.5 MMOL/L (ref 3.5–5.2)
PROT SERPL-MCNC: 6.7 G/DL (ref 6–8.5)
RBC # BLD AUTO: 4.23 X10E6/UL (ref 4.14–5.8)
SODIUM SERPL-SCNC: 138 MMOL/L (ref 134–144)
VIT B12 SERPL-MCNC: 420 PG/ML (ref 232–1245)
WBC # BLD AUTO: 1.2 X10E3/UL (ref 3.4–10.8)

## 2025-07-05 ENCOUNTER — HOSPITAL ENCOUNTER (OUTPATIENT)
Dept: ULTRASOUND IMAGING | Facility: HOSPITAL | Age: 55
Discharge: HOME | End: 2025-07-05
Payer: COMMERCIAL

## 2025-07-05 DIAGNOSIS — R18.8 CIRRHOSIS OF LIVER WITH ASCITES, UNSPECIFIED HEPATIC CIRRHOSIS TYPE  (HCC): ICD-10-CM

## 2025-07-05 DIAGNOSIS — K74.60 CIRRHOSIS OF LIVER WITH ASCITES, UNSPECIFIED HEPATIC CIRRHOSIS TYPE  (HCC): ICD-10-CM

## 2025-07-05 PROCEDURE — 76700 US EXAM ABDOM COMPLETE: CPT
